# Patient Record
Sex: MALE | Race: WHITE | NOT HISPANIC OR LATINO
[De-identification: names, ages, dates, MRNs, and addresses within clinical notes are randomized per-mention and may not be internally consistent; named-entity substitution may affect disease eponyms.]

---

## 2017-02-13 ENCOUNTER — APPOINTMENT (OUTPATIENT)
Dept: OTOLARYNGOLOGY | Facility: CLINIC | Age: 62
End: 2017-02-13

## 2017-02-21 LAB — BACTERIA FLD CULT: ABNORMAL

## 2017-03-14 ENCOUNTER — OUTPATIENT (OUTPATIENT)
Dept: OUTPATIENT SERVICES | Facility: HOSPITAL | Age: 62
LOS: 1 days | End: 2017-03-14
Payer: COMMERCIAL

## 2017-03-14 PROCEDURE — A9585: CPT

## 2017-03-14 PROCEDURE — 70553 MRI BRAIN STEM W/O & W/DYE: CPT

## 2017-03-14 PROCEDURE — 70553 MRI BRAIN STEM W/O & W/DYE: CPT | Mod: 26

## 2017-03-16 ENCOUNTER — APPOINTMENT (OUTPATIENT)
Dept: NEUROLOGY | Facility: CLINIC | Age: 62
End: 2017-03-16

## 2017-03-16 VITALS
BODY MASS INDEX: 23.64 KG/M2 | WEIGHT: 156 LBS | SYSTOLIC BLOOD PRESSURE: 128 MMHG | HEIGHT: 68 IN | DIASTOLIC BLOOD PRESSURE: 77 MMHG | TEMPERATURE: 97.6 F | OXYGEN SATURATION: 99 % | HEART RATE: 69 BPM

## 2017-03-21 ENCOUNTER — MEDICATION RENEWAL (OUTPATIENT)
Age: 62
End: 2017-03-21

## 2017-03-27 ENCOUNTER — MEDICATION RENEWAL (OUTPATIENT)
Age: 62
End: 2017-03-27

## 2017-03-30 ENCOUNTER — OUTPATIENT (OUTPATIENT)
Dept: OUTPATIENT SERVICES | Facility: HOSPITAL | Age: 62
LOS: 1 days | End: 2017-03-30
Payer: COMMERCIAL

## 2017-03-30 DIAGNOSIS — D64.9 ANEMIA, UNSPECIFIED: ICD-10-CM

## 2017-03-30 DIAGNOSIS — R53.1 WEAKNESS: ICD-10-CM

## 2017-03-30 DIAGNOSIS — M24.50 CONTRACTURE, UNSPECIFIED JOINT: ICD-10-CM

## 2017-03-30 DIAGNOSIS — D89.89 OTHER SPECIFIED DISORDERS INVOLVING THE IMMUNE MECHANISM, NOT ELSEWHERE CLASSIFIED: ICD-10-CM

## 2017-03-30 LAB
BASOPHILS NFR BLD AUTO: 0.2 % — SIGNIFICANT CHANGE UP (ref 0–2)
EOSINOPHIL NFR BLD AUTO: 1.6 % — SIGNIFICANT CHANGE UP (ref 0–6)
ERYTHROCYTE [SEDIMENTATION RATE] IN BLOOD: 15 MM/HR — SIGNIFICANT CHANGE UP
HCT VFR BLD CALC: 39.6 % — SIGNIFICANT CHANGE UP (ref 39–50)
HGB BLD-MCNC: 12.9 G/DL — LOW (ref 13–17)
LYMPHOCYTES # BLD AUTO: 36.1 % — SIGNIFICANT CHANGE UP (ref 13–44)
MCHC RBC-ENTMCNC: 29.1 PG — SIGNIFICANT CHANGE UP (ref 27–34)
MCHC RBC-ENTMCNC: 32.6 G/DL — SIGNIFICANT CHANGE UP (ref 32–36)
MCV RBC AUTO: 89.2 FL — SIGNIFICANT CHANGE UP (ref 80–100)
MONOCYTES NFR BLD AUTO: 7 % — SIGNIFICANT CHANGE UP (ref 2–14)
NEUTROPHILS NFR BLD AUTO: 55.1 % — SIGNIFICANT CHANGE UP (ref 43–77)
PLATELET # BLD AUTO: 136 K/UL — LOW (ref 150–400)
RBC # BLD: 4.44 M/UL — SIGNIFICANT CHANGE UP (ref 4.2–5.8)
RBC # FLD: 14.5 % — SIGNIFICANT CHANGE UP (ref 10.3–16.9)
WBC # BLD: 4.4 K/UL — SIGNIFICANT CHANGE UP (ref 3.8–10.5)
WBC # FLD AUTO: 4.4 K/UL — SIGNIFICANT CHANGE UP (ref 3.8–10.5)

## 2017-03-30 PROCEDURE — 85025 COMPLETE CBC W/AUTO DIFF WBC: CPT

## 2017-03-30 PROCEDURE — 36415 COLL VENOUS BLD VENIPUNCTURE: CPT

## 2017-03-30 PROCEDURE — 85652 RBC SED RATE AUTOMATED: CPT

## 2017-03-31 LAB
DACRYOCYTES BLD QL SMEAR: SLIGHT — SIGNIFICANT CHANGE UP
EOSINOPHIL NFR BLD AUTO: 1 % — SIGNIFICANT CHANGE UP (ref 0–6)
LYMPHOCYTES # BLD AUTO: 33 % — SIGNIFICANT CHANGE UP (ref 13–44)
MANUAL DIF COMMENT BLD-IMP: SIGNIFICANT CHANGE UP
MANUAL SMEAR VERIFICATION: YES — SIGNIFICANT CHANGE UP
MONOCYTES NFR BLD AUTO: 8 % — SIGNIFICANT CHANGE UP (ref 2–14)
NEUTROPHILS NFR BLD AUTO: 56 % — SIGNIFICANT CHANGE UP (ref 43–77)
NEUTS BAND # BLD: 2 % — SIGNIFICANT CHANGE UP
OVALOCYTES BLD QL SMEAR: SLIGHT — SIGNIFICANT CHANGE UP
PLAT MORPH BLD: NORMAL — SIGNIFICANT CHANGE UP
POLYCHROMASIA BLD QL SMEAR: SLIGHT — SIGNIFICANT CHANGE UP
RBC BLD AUTO: (no result)

## 2017-04-17 ENCOUNTER — LABORATORY RESULT (OUTPATIENT)
Age: 62
End: 2017-04-17

## 2017-04-17 ENCOUNTER — APPOINTMENT (OUTPATIENT)
Dept: OTOLARYNGOLOGY | Facility: CLINIC | Age: 62
End: 2017-04-17

## 2017-04-17 ENCOUNTER — OTHER (OUTPATIENT)
Age: 62
End: 2017-04-17

## 2017-04-18 LAB
25(OH)D3 SERPL-MCNC: 31.3 NG/ML
BASOPHILS # BLD AUTO: 0.02 K/UL
BASOPHILS NFR BLD AUTO: 0.5 %
EOSINOPHIL # BLD AUTO: 0.08 K/UL
EOSINOPHIL NFR BLD AUTO: 2 %
HCT VFR BLD CALC: 40.2 %
HGB BLD-MCNC: 12.2 G/DL
IMM GRANULOCYTES NFR BLD AUTO: 0.2 %
INR PPP: 1.01 RATIO
LYMPHOCYTES # BLD AUTO: 1.9 K/UL
LYMPHOCYTES NFR BLD AUTO: 46.7 %
MAN DIFF?: NORMAL
MCHC RBC-ENTMCNC: 27.9 PG
MCHC RBC-ENTMCNC: 30.3 GM/DL
MCV RBC AUTO: 92 FL
MONOCYTES # BLD AUTO: 0.44 K/UL
MONOCYTES NFR BLD AUTO: 10.8 %
NEUTROPHILS # BLD AUTO: 1.62 K/UL
NEUTROPHILS NFR BLD AUTO: 39.8 %
PLATELET # BLD AUTO: 144 K/UL
PT BLD: 11.4 SEC
RBC # BLD: 4.37 M/UL
RBC # FLD: 14.3 %
WBC # FLD AUTO: 4.07 K/UL

## 2017-04-19 ENCOUNTER — FORM ENCOUNTER (OUTPATIENT)
Age: 62
End: 2017-04-19

## 2017-04-19 ENCOUNTER — MEDICATION RENEWAL (OUTPATIENT)
Age: 62
End: 2017-04-19

## 2017-04-19 LAB
ALBUMIN SERPL ELPH-MCNC: 4 G/DL
ALP BLD-CCNC: 46 U/L
ALT SERPL-CCNC: 14 U/L
ANION GAP SERPL CALC-SCNC: 16 MMOL/L
AST SERPL-CCNC: 26 U/L
BILIRUB SERPL-MCNC: 0.4 MG/DL
BUN SERPL-MCNC: 23 MG/DL
CALCIUM SERPL-MCNC: 9.1 MG/DL
CHLORIDE SERPL-SCNC: 105 MMOL/L
CHOLEST SERPL-MCNC: 118 MG/DL
CHOLEST/HDLC SERPL: 2.5 RATIO
CO2 SERPL-SCNC: 20 MMOL/L
CREAT SERPL-MCNC: 1.41 MG/DL
GLUCOSE SERPL-MCNC: 97 MG/DL
HDLC SERPL-MCNC: 47 MG/DL
LDLC SERPL CALC-MCNC: 41 MG/DL
POTASSIUM SERPL-SCNC: 4.7 MMOL/L
PROT SERPL-MCNC: 6.4 G/DL
PSA SERPL-MCNC: 0.85 NG/ML
SODIUM SERPL-SCNC: 141 MMOL/L
T4 FREE SERPL-MCNC: 0.9 NG/DL
TESTOST SERPL-MCNC: 335.3 NG/DL
TRIGL SERPL-MCNC: 148 MG/DL
TSH SERPL-ACNC: 2.07 UIU/ML

## 2017-04-19 RX ORDER — PROMETHAZINE HYDROCHLORIDE 6.25 MG/5ML
6.25 SOLUTION ORAL EVERY 8 HOURS
Qty: 210 | Refills: 0 | Status: COMPLETED | COMMUNITY
Start: 2017-03-27 | End: 2017-04-19

## 2017-04-20 ENCOUNTER — LABORATORY RESULT (OUTPATIENT)
Age: 62
End: 2017-04-20

## 2017-04-20 ENCOUNTER — OUTPATIENT (OUTPATIENT)
Dept: OUTPATIENT SERVICES | Facility: HOSPITAL | Age: 62
LOS: 1 days | End: 2017-04-20
Payer: COMMERCIAL

## 2017-04-20 ENCOUNTER — APPOINTMENT (OUTPATIENT)
Dept: NEPHROLOGY | Facility: CLINIC | Age: 62
End: 2017-04-20

## 2017-04-20 VITALS — SYSTOLIC BLOOD PRESSURE: 108 MMHG | DIASTOLIC BLOOD PRESSURE: 50 MMHG | HEART RATE: 48 BPM

## 2017-04-20 VITALS — HEART RATE: 72 BPM | SYSTOLIC BLOOD PRESSURE: 100 MMHG | DIASTOLIC BLOOD PRESSURE: 52 MMHG

## 2017-04-20 VITALS — SYSTOLIC BLOOD PRESSURE: 112 MMHG | DIASTOLIC BLOOD PRESSURE: 60 MMHG | HEART RATE: 60 BPM

## 2017-04-20 VITALS — WEIGHT: 156 LBS | BODY MASS INDEX: 23.72 KG/M2 | HEART RATE: 63 BPM | OXYGEN SATURATION: 98 %

## 2017-04-20 PROCEDURE — 76856 US EXAM PELVIC COMPLETE: CPT

## 2017-04-20 PROCEDURE — 76856 US EXAM PELVIC COMPLETE: CPT | Mod: 26,59

## 2017-04-20 PROCEDURE — A9562: CPT

## 2017-04-20 PROCEDURE — 93975 VASCULAR STUDY: CPT

## 2017-04-20 PROCEDURE — 78707 K FLOW/FUNCT IMAGE W/O DRUG: CPT

## 2017-04-20 PROCEDURE — 76700 US EXAM ABDOM COMPLETE: CPT

## 2017-04-20 PROCEDURE — 76700 US EXAM ABDOM COMPLETE: CPT | Mod: 26,59

## 2017-04-20 PROCEDURE — 93975 VASCULAR STUDY: CPT | Mod: 26

## 2017-04-20 PROCEDURE — 78707 K FLOW/FUNCT IMAGE W/O DRUG: CPT | Mod: 26

## 2017-04-21 LAB
APPEARANCE: CLEAR
BILIRUBIN URINE: NEGATIVE
BLOOD URINE: NEGATIVE
COLOR: YELLOW
GLUCOSE QUALITATIVE U: NORMAL MG/DL
KETONES URINE: NEGATIVE
LEUKOCYTE ESTERASE URINE: NEGATIVE
NITRITE URINE: NEGATIVE
PH URINE: 5
PROTEIN URINE: NEGATIVE MG/DL
SPECIFIC GRAVITY URINE: 1.02
UROBILINOGEN URINE: NORMAL MG/DL

## 2017-04-23 LAB
24R-OH-CALCIDIOL SERPL-MCNC: 51.5 PG/ML
25(OH)D3 SERPL-MCNC: 37.2 NG/ML
ACTH SER-ACNC: 20 PG/ML
ALBUMIN SERPL ELPH-MCNC: 4.7 G/DL
ALP BLD-CCNC: 56 U/L
ALT SERPL-CCNC: 21 U/L
ANA SER IF-ACNC: NEGATIVE
ANION GAP SERPL CALC-SCNC: 15 MMOL/L
AST SERPL-CCNC: 29 U/L
BASOPHILS # BLD AUTO: 0.01 K/UL
BASOPHILS NFR BLD AUTO: 0.2 %
BILIRUB SERPL-MCNC: 0.4 MG/DL
BUN SERPL-MCNC: 16 MG/DL
CALCIUM SERPL-MCNC: 9.9 MG/DL
CALCIUM SERPL-MCNC: 9.9 MG/DL
CHLORIDE SERPL-SCNC: 106 MMOL/L
CHOLEST SERPL-MCNC: 136 MG/DL
CHOLEST/HDLC SERPL: 2.6 RATIO
CO2 SERPL-SCNC: 21 MMOL/L
CORTIS SERPL-MCNC: 22.4 UG/DL
CREAT SERPL-MCNC: 1.37 MG/DL
CRP SERPL-MCNC: <0.2 MG/DL
EOSINOPHIL # BLD AUTO: 0.05 K/UL
EOSINOPHIL NFR BLD AUTO: 1 %
ERYTHROCYTE [SEDIMENTATION RATE] IN BLOOD BY WESTERGREN METHOD: 26 MM/HR
FERRITIN SERPL-MCNC: 189.8 NG/ML
FOLATE SERPL-MCNC: >20 NG/ML
FSH SERPL-MCNC: 5 IU/L
GLUCOSE SERPL-MCNC: 88 MG/DL
HBA1C MFR BLD HPLC: 5.8 %
HBV SURFACE AB SER QL: REACTIVE
HBV SURFACE AG SER QL: NONREACTIVE
HCT VFR BLD CALC: 43.5 %
HCV AB SER QL: NONREACTIVE
HCV S/CO RATIO: 0.12 S/CO
HCYS SERPL-MCNC: 9.2 UMOL/L
HDLC SERPL-MCNC: 52 MG/DL
HGB BLD-MCNC: 14 G/DL
IGF-I BLD-MCNC: 174 NG/ML
IMM GRANULOCYTES NFR BLD AUTO: 0.2 %
IRON SATN MFR SERPL: 35 %
IRON SERPL-MCNC: 108 UG/DL
LDLC SERPL CALC-MCNC: 60 MG/DL
LH SERPL-ACNC: 2.9 IU/L
LYMPHOCYTES # BLD AUTO: 1.18 K/UL
LYMPHOCYTES NFR BLD AUTO: 24.4 %
MAGNESIUM SERPL-MCNC: 2.2 MG/DL
MAN DIFF?: NORMAL
MCHC RBC-ENTMCNC: 29.4 PG
MCHC RBC-ENTMCNC: 32.2 GM/DL
MCV RBC AUTO: 91.4 FL
MONOCYTES # BLD AUTO: 0.32 K/UL
MONOCYTES NFR BLD AUTO: 6.6 %
MPO AB + PR3 PNL SER: NORMAL
NEUTROPHILS # BLD AUTO: 3.26 K/UL
NEUTROPHILS NFR BLD AUTO: 67.6 %
PARATHYROID HORMONE INTACT: 50 PG/ML
PHOSPHATE SERPL-MCNC: 2.6 MG/DL
PLATELET # BLD AUTO: 145 K/UL
POTASSIUM SERPL-SCNC: 4.9 MMOL/L
PROLACTIN SERPL-MCNC: 20.2 NG/ML
PROT SERPL-MCNC: 7.7 G/DL
RBC # BLD: 4.76 M/UL
RBC # FLD: 14.5 %
RHEUMATOID FACT SER QL: <7 IU/ML
SODIUM SERPL-SCNC: 142 MMOL/L
T3FREE SERPL-MCNC: 2.98 PG/ML
T3RU NFR SERPL: 1.16 INDEX
T4 FREE SERPL-MCNC: 1 NG/DL
T4 SERPL-MCNC: 6.9 UG/DL
THYROGLOB AB SERPL-ACNC: <20 IU/ML
THYROPEROXIDASE AB SERPL IA-ACNC: 16.7 IU/ML
TIBC SERPL-MCNC: 312 UG/DL
TRIGL SERPL-MCNC: 121 MG/DL
TSH SERPL-ACNC: 2.63 UIU/ML
UIBC SERPL-MCNC: 204 UG/DL
URATE SERPL-MCNC: 5.2 MG/DL
VIT B12 SERPL-MCNC: 342 PG/ML
WBC # FLD AUTO: 4.83 K/UL

## 2017-04-24 ENCOUNTER — LABORATORY RESULT (OUTPATIENT)
Age: 62
End: 2017-04-24

## 2017-04-24 LAB — VIT B6 SERPL-MCNC: 7.4 UG/L

## 2017-04-25 DIAGNOSIS — R03.0 ELEVATED BLOOD-PRESSURE READING, WITHOUT DIAGNOSIS OF HYPERTENSION: ICD-10-CM

## 2017-04-25 DIAGNOSIS — I95.9 HYPOTENSION, UNSPECIFIED: ICD-10-CM

## 2017-04-25 DIAGNOSIS — Z96.0 PRESENCE OF UROGENITAL IMPLANTS: ICD-10-CM

## 2017-04-25 DIAGNOSIS — N28.1 CYST OF KIDNEY, ACQUIRED: ICD-10-CM

## 2017-04-25 LAB
ALBUMIN MFR SERPL ELPH: 56.6 %
ALBUMIN SERPL-MCNC: 4.4 G/DL
ALBUMIN/GLOB SERPL: 1.3 RATIO
ALBUPE: 9.4 %
ALDOSTERONE SERUM: 5.8 NG/DL
ALP BONE SERPL-MCNC: 10 MCG/L
ALPHA1 GLOB MFR SERPL ELPH: 5.1 %
ALPHA1 GLOB SERPL ELPH-MCNC: 0.4 G/DL
ALPHA1UPE: 4.3 %
ALPHA2 GLOB MFR SERPL ELPH: 9 %
ALPHA2 GLOB SERPL ELPH-MCNC: 0.7 G/DL
ALPHA2UPE: 13.5 %
B-GLOBULIN MFR SERPL ELPH: 11.3 %
B-GLOBULIN SERPL ELPH-MCNC: 0.9 G/DL
BENCE JONES EXCRETION: 0 MG/24HR
BETAUPE: 17.2 %
C3 SERPL-MCNC: 163 MG/DL
C4 SERPL-MCNC: 43 MG/DL
CAU: 3 MG/DL
CHLORIDE UR-SCNC: 159 MMOL/24HR
COLLAGEN CTX SERPL-MCNC: 197 PG/ML
CREAT 24H UR-MCNC: 1.6 G/24 H
CREAT ?TM UR-MCNC: 119 MG/DL
CREATININE UR (MAYO): 119 MG/DL
CYSTATIN C SERPL-MCNC: 0.91 MG/L
DEPRECATED KAPPA LC FREE/LAMBDA SER: 1.92 RATIO
DEPRECATED KAPPA LC FREE/LAMBDA SER: 1.92 RATIO
GAMMA GLOB FLD ELPH-MCNC: 1.4 G/DL
GAMMA GLOB MFR SERPL ELPH: 18 %
GAMMAUPE: 55.6 %
GFR/BSA.PRED SERPLBLD CYS-BASED-ARV: 87
IGA 24H UR QL IFE: NORMAL
IGA SER QL IEP: 204 MG/DL
IGG SER QL IEP: 1420 MG/DL
IGM SER QL IEP: 132 MG/DL
INTERPRETATION SERPL IEP-IMP: NORMAL
KAPPA LC 24H UR QL: 0 MG/DL
KAPPA LC CSF-MCNC: 1.19 MG/DL
KAPPA LC CSF-MCNC: 1.19 MG/DL
KAPPA LC SERPL-MCNC: 2.28 MG/DL
KAPPA LC SERPL-MCNC: 2.28 MG/DL
M PROTEIN 24H MFR UR ELPH: 0 %
M PROTEIN SPEC IFE-MCNC: NORMAL
MICROALBUMIN 24H UR DL<=1MG/L-MCNC: 1.2 MG/DL
MICROALBUMIN 24H UR DL<=1MG/L-MRATE: 16.2 MG/24HR
MICROALBUMIN ?TM UR-MRATE: 11.2 UG/MIN
OSMOLALITY 24H UR: 606 MOS/KG
PHOSPHATE/CREAT 24H UR-SRTO: 0.7 G/24 H
POTASSIUM 24H UR-MRATE: 74 MMOL/24HR
POTASSIUM ?TM SUB UR QN: 55 MMOL/L
PROT 24H UR-MRATE: 6 MG/DL
PROT ?TM UR-MCNC: 24 HR
PROT PATTERN 24H UR ELPH-IMP: NORMAL
PROT SERPL-MCNC: 7.7 G/DL
PROT SERPL-MCNC: 7.7 G/DL
PROT UR-MCNC: 6 MG/DL
PROT UR-MCNC: 6 MG/DL
PROT UR-MCNC: 81 MG/24 H
SHBG SERPL-SCNC: 84 NMOL/L
SODIUM 24H UR-SCNC: 115 MMOL/L
SODIUM 24H UR-SRATE: 155 MMOL/24HR
SODIUM ?TM SUB UR QN: 118 MMOL/L
SPECIMEN VOL 24H UR: 1350 ML
SPECIMEN VOL 24H UR: 40 MG/24 H
U CL: 118 MMOL/L
U PHOS: 53.8 MG/DL
U PROTEIN QNT CALCULATION: 81 MG/24 H
U URIC: 32 MG/DL
URATE/CREAT 24H UR: 432 MG/24HR

## 2017-04-30 LAB
B2 MICROGLOB 24H UR-MCNC: 0.1 MG/L
CORTIS 24H UR-MCNC: 24 H
CORTIS 24H UR-MRATE: 12 MCG/24 H
FREE KAPPA URINE: 6.44 MG/L
FREE KAPPA/LAMDA RATIO: 32.2
FREE LAMDA URINE: 0.2 MG/L
METHYLMALONATE SERPL-SCNC: 378 NMOL/L
OXALATE UR-SCNC: 23 MG/24 H
SPECIMEN VOL 24H UR: 1350 ML
SPECIMEN VOL 24H UR: 1350 ML/24 H
TESTOST BND SERPL-MCNC: 2.1 PG/ML
TESTOST SERPL-MCNC: 476.1 NG/DL

## 2017-05-19 RX ORDER — EPINEPHRINE 0.3 MG/.3ML
0.3 INJECTION INTRAMUSCULAR
Qty: 2 | Refills: 0 | Status: DISCONTINUED | COMMUNITY
Start: 2017-03-04 | End: 2017-05-19

## 2017-05-19 RX ORDER — TERBINAFINE HYDROCHLORIDE 250 MG/1
250 TABLET ORAL
Qty: 30 | Refills: 0 | Status: DISCONTINUED | COMMUNITY
Start: 2016-09-15 | End: 2017-05-19

## 2017-05-19 RX ORDER — PREDNISONE 10 MG/1
10 TABLET ORAL
Qty: 90 | Refills: 0 | Status: DISCONTINUED | COMMUNITY
Start: 2016-12-01 | End: 2017-05-19

## 2017-05-19 RX ORDER — DIPHENHYDRAMINE HCL 50 MG/1
50 CAPSULE ORAL
Qty: 60 | Refills: 0 | Status: DISCONTINUED | COMMUNITY
Start: 2017-03-04 | End: 2017-05-19

## 2017-05-19 RX ORDER — OSELTAMIVIR PHOSPHATE 75 MG/1
75 CAPSULE ORAL
Qty: 10 | Refills: 0 | Status: DISCONTINUED | COMMUNITY
Start: 2017-01-24 | End: 2017-05-19

## 2017-05-19 RX ORDER — THEOPHYLLINE 300 MG/1
300 TABLET, EXTENDED RELEASE ORAL
Qty: 60 | Refills: 0 | Status: DISCONTINUED | COMMUNITY
Start: 2016-09-19 | End: 2017-05-19

## 2017-05-19 RX ORDER — KETOCONAZOLE 20.5 MG/ML
2 SHAMPOO, SUSPENSION TOPICAL
Qty: 120 | Refills: 0 | Status: DISCONTINUED | COMMUNITY
Start: 2016-10-25 | End: 2017-05-19

## 2017-05-19 RX ORDER — AZITHROMYCIN DIHYDRATE 250 MG/1
250 TABLET, FILM COATED ORAL
Qty: 1 | Refills: 0 | Status: DISCONTINUED | COMMUNITY
Start: 2017-03-27 | End: 2017-05-19

## 2017-05-19 RX ORDER — PREDNISONE 5 MG/1
5 TABLET ORAL
Qty: 120 | Refills: 0 | Status: DISCONTINUED | COMMUNITY
Start: 2017-01-04 | End: 2017-05-19

## 2017-05-19 RX ORDER — INDOMETHACIN 75 MG/1
75 CAPSULE, EXTENDED RELEASE ORAL
Qty: 60 | Refills: 0 | Status: DISCONTINUED | COMMUNITY
Start: 2017-03-12 | End: 2017-05-19

## 2017-05-22 LAB
ALBUMIN SERPL ELPH-MCNC: 4.2 G/DL
ALP BLD-CCNC: 55 U/L
ALT SERPL-CCNC: 10 U/L
ANION GAP SERPL CALC-SCNC: 15 MMOL/L
AST SERPL-CCNC: 19 U/L
BASOPHILS # BLD AUTO: 0.01 K/UL
BASOPHILS NFR BLD AUTO: 0.2 %
BILIRUB SERPL-MCNC: 0.3 MG/DL
BUN SERPL-MCNC: 19 MG/DL
CALCIUM SERPL-MCNC: 9.1 MG/DL
CHLORIDE SERPL-SCNC: 106 MMOL/L
CHOLEST SERPL-MCNC: 162 MG/DL
CHOLEST/HDLC SERPL: 3.6 RATIO
CK SERPL-CCNC: 99 U/L
CO2 SERPL-SCNC: 21 MMOL/L
CREAT SERPL-MCNC: 1.19 MG/DL
EOSINOPHIL # BLD AUTO: 0.11 K/UL
EOSINOPHIL NFR BLD AUTO: 2.4 %
GLUCOSE SERPL-MCNC: 90 MG/DL
HBA1C MFR BLD HPLC: 5.8 %
HCT VFR BLD CALC: 41.3 %
HDLC SERPL-MCNC: 45 MG/DL
HGB BLD-MCNC: 12.8 G/DL
IMM GRANULOCYTES NFR BLD AUTO: 0.2 %
LDLC SERPL CALC-MCNC: 94 MG/DL
LYMPHOCYTES # BLD AUTO: 2.11 K/UL
LYMPHOCYTES NFR BLD AUTO: 46.1 %
MAN DIFF?: NORMAL
MCHC RBC-ENTMCNC: 27.9 PG
MCHC RBC-ENTMCNC: 31 GM/DL
MCV RBC AUTO: 90.2 FL
MONOCYTES # BLD AUTO: 0.41 K/UL
MONOCYTES NFR BLD AUTO: 9 %
NEUTROPHILS # BLD AUTO: 1.93 K/UL
NEUTROPHILS NFR BLD AUTO: 42.1 %
PLATELET # BLD AUTO: 134 K/UL
POTASSIUM SERPL-SCNC: 4.6 MMOL/L
PROT SERPL-MCNC: 6.7 G/DL
PSA SERPL-MCNC: 1.09 NG/ML
RBC # BLD: 4.58 M/UL
RBC # FLD: 14.2 %
SODIUM SERPL-SCNC: 142 MMOL/L
T4 FREE SERPL-MCNC: 1 NG/DL
TRIGL SERPL-MCNC: 117 MG/DL
TSH SERPL-ACNC: 1.93 UIU/ML
WBC # FLD AUTO: 4.58 K/UL

## 2017-05-23 LAB — ALDOSTERONE SERUM: 6.3 NG/DL

## 2017-05-24 LAB
TESTOST BND SERPL-MCNC: 5.8 PG/ML
TESTOST SERPL-MCNC: 932.4 NG/DL

## 2017-06-06 ENCOUNTER — APPOINTMENT (OUTPATIENT)
Dept: OPHTHALMOLOGY | Facility: CLINIC | Age: 62
End: 2017-06-06

## 2017-06-06 DIAGNOSIS — Z87.09 PERSONAL HISTORY OF OTHER DISEASES OF THE RESPIRATORY SYSTEM: ICD-10-CM

## 2017-06-13 PROBLEM — Z87.09 HISTORY OF ACUTE PHARYNGITIS: Status: RESOLVED | Noted: 2017-03-27 | Resolved: 2017-06-13

## 2017-06-15 LAB
25(OH)D3 SERPL-MCNC: 24.4 NG/ML
ALDOSTERONE SERUM: 16.3 NG/DL
BASOPHILS # BLD AUTO: 0.01 K/UL
BASOPHILS NFR BLD AUTO: 0.2 %
CCP AB SER IA-ACNC: <8 UNITS
CHOLEST SERPL-MCNC: 217 MG/DL
CHOLEST/HDLC SERPL: 5.2 RATIO
CK MB BLD-MCNC: 0.8 NG/ML
CRP SERPL-MCNC: <0.2 MG/DL
EOSINOPHIL # BLD AUTO: 0.09 K/UL
EOSINOPHIL NFR BLD AUTO: 2.1 %
ERYTHROCYTE [SEDIMENTATION RATE] IN BLOOD BY WESTERGREN METHOD: 19 MM/HR
FERRITIN SERPL-MCNC: 188 NG/ML
FOLATE SERPL-MCNC: >20 NG/ML
HCT VFR BLD CALC: 45.1 %
HDLC SERPL-MCNC: 42 MG/DL
HGB BLD-MCNC: 13.6 G/DL
IMM GRANULOCYTES NFR BLD AUTO: 0 %
IRON SATN MFR SERPL: 32 %
IRON SERPL-MCNC: 98 UG/DL
LDLC SERPL CALC-MCNC: 122 MG/DL
LYMPHOCYTES # BLD AUTO: 1.85 K/UL
LYMPHOCYTES NFR BLD AUTO: 44 %
MAN DIFF?: NORMAL
MCHC RBC-ENTMCNC: 27.6 PG
MCHC RBC-ENTMCNC: 30.2 GM/DL
MCV RBC AUTO: 91.5 FL
MONOCYTES # BLD AUTO: 0.4 K/UL
MONOCYTES NFR BLD AUTO: 9.5 %
NEUTROPHILS # BLD AUTO: 1.85 K/UL
NEUTROPHILS NFR BLD AUTO: 44.2 %
PLATELET # BLD AUTO: 153 K/UL
RBC # BLD: 4.93 M/UL
RBC # FLD: 15 %
RF+CCP IGG SER-IMP: NEGATIVE
RHEUMATOID FACT SER QL: <7 IU/ML
TESTOST SERPL-MCNC: 364.9 NG/DL
TIBC SERPL-MCNC: 309 UG/DL
TRIGL SERPL-MCNC: 267 MG/DL
UIBC SERPL-MCNC: 211 UG/DL
WBC # FLD AUTO: 4.2 K/UL

## 2017-06-19 LAB
CARN ESTERS SERPL-MCNC: 14.6 UMOL/L
CARNITINE FREE SERPL-SCNC: 38.5 UMOL/L
CARNITINE FREE SFR SERPL: 0.4 UMOL/L
CARNITINE SERPL-SCNC: 53.2 UMOL/L

## 2017-07-26 ENCOUNTER — APPOINTMENT (OUTPATIENT)
Dept: NEUROLOGY | Facility: CLINIC | Age: 62
End: 2017-07-26

## 2017-07-26 VITALS — HEIGHT: 68 IN | BODY MASS INDEX: 23.19 KG/M2 | WEIGHT: 153 LBS

## 2017-07-26 VITALS — OXYGEN SATURATION: 96 % | SYSTOLIC BLOOD PRESSURE: 119 MMHG | DIASTOLIC BLOOD PRESSURE: 74 MMHG | HEART RATE: 64 BPM

## 2017-07-27 ENCOUNTER — MEDICATION RENEWAL (OUTPATIENT)
Age: 62
End: 2017-07-27

## 2017-07-27 RX ORDER — TESTOSTERONE 30 MG/1.5ML
30 SOLUTION TOPICAL
Qty: 30 | Refills: 0 | Status: DISCONTINUED | COMMUNITY
Start: 2016-09-01 | End: 2017-07-27

## 2017-08-17 ENCOUNTER — MEDICATION RENEWAL (OUTPATIENT)
Age: 62
End: 2017-08-17

## 2017-11-28 LAB
25(OH)D3 SERPL-MCNC: 34.4 NG/ML
ALBUMIN SERPL ELPH-MCNC: 4.2 G/DL
ALP BLD-CCNC: 61 U/L
ALT SERPL-CCNC: 20 U/L
ANION GAP SERPL CALC-SCNC: 18 MMOL/L
AST SERPL-CCNC: 21 U/L
BASOPHILS # BLD AUTO: 0.03 K/UL
BASOPHILS NFR BLD AUTO: 0.4 %
BILIRUB SERPL-MCNC: 0.6 MG/DL
BUN SERPL-MCNC: 16 MG/DL
CALCIUM SERPL-MCNC: 9.4 MG/DL
CHLORIDE SERPL-SCNC: 104 MMOL/L
CHOLEST SERPL-MCNC: 215 MG/DL
CHOLEST/HDLC SERPL: 3.4 RATIO
CO2 SERPL-SCNC: 21 MMOL/L
CREAT SERPL-MCNC: 0.76 MG/DL
EOSINOPHIL # BLD AUTO: 0.86 K/UL
EOSINOPHIL NFR BLD AUTO: 11.3 %
GLUCOSE SERPL-MCNC: 140 MG/DL
HBA1C MFR BLD HPLC: 5.4 %
HCT VFR BLD CALC: 44 %
HDLC SERPL-MCNC: 64 MG/DL
HGB BLD-MCNC: 14.6 G/DL
IMM GRANULOCYTES NFR BLD AUTO: 0.1 %
LDLC SERPL CALC-MCNC: 131 MG/DL
LYMPHOCYTES # BLD AUTO: 1.98 K/UL
LYMPHOCYTES NFR BLD AUTO: 26 %
MAN DIFF?: NORMAL
MCHC RBC-ENTMCNC: 31.4 PG
MCHC RBC-ENTMCNC: 33.2 GM/DL
MCV RBC AUTO: 94.6 FL
MONOCYTES # BLD AUTO: 0.26 K/UL
MONOCYTES NFR BLD AUTO: 3.4 %
NEUTROPHILS # BLD AUTO: 4.49 K/UL
NEUTROPHILS NFR BLD AUTO: 58.8 %
PLATELET # BLD AUTO: 226 K/UL
POTASSIUM SERPL-SCNC: 4.1 MMOL/L
PROT SERPL-MCNC: 7.6 G/DL
RBC # BLD: 4.65 M/UL
RBC # FLD: 13.4 %
SODIUM SERPL-SCNC: 143 MMOL/L
T4 FREE SERPL-MCNC: 1.2 NG/DL
TRIGL SERPL-MCNC: 100 MG/DL
TSH SERPL-ACNC: 3.04 UIU/ML
WBC # FLD AUTO: 7.63 K/UL

## 2018-01-31 ENCOUNTER — APPOINTMENT (OUTPATIENT)
Dept: OTOLARYNGOLOGY | Facility: CLINIC | Age: 63
End: 2018-01-31
Payer: COMMERCIAL

## 2018-01-31 DIAGNOSIS — J04.0 ACUTE LARYNGITIS: ICD-10-CM

## 2018-01-31 DIAGNOSIS — K21.9 ACUTE LARYNGITIS: ICD-10-CM

## 2018-01-31 PROCEDURE — 99212 OFFICE O/P EST SF 10 MIN: CPT | Mod: 25

## 2018-01-31 PROCEDURE — 31575 DIAGNOSTIC LARYNGOSCOPY: CPT

## 2018-01-31 RX ORDER — SUVOREXANT 10 MG/1
10 TABLET, FILM COATED ORAL
Qty: 30 | Refills: 0 | Status: COMPLETED | COMMUNITY
Start: 2017-03-21 | End: 2018-01-31

## 2018-01-31 RX ORDER — CLONAZEPAM 0.5 MG/1
0.5 TABLET ORAL
Qty: 60 | Refills: 0 | Status: COMPLETED | COMMUNITY
Start: 2017-07-27 | End: 2018-01-31

## 2018-01-31 RX ORDER — ESOMEPRAZOLE MAGNESIUM 40 MG/1
40 CAPSULE, DELAYED RELEASE ORAL DAILY
Qty: 180 | Refills: 0 | Status: COMPLETED | COMMUNITY
Start: 2017-04-19 | End: 2018-01-31

## 2018-01-31 RX ORDER — TESTOSTERONE 30 MG/1.5ML
30 SOLUTION TOPICAL
Qty: 1 | Refills: 0 | Status: COMPLETED | COMMUNITY
Start: 2017-07-27 | End: 2018-01-31

## 2018-03-29 ENCOUNTER — APPOINTMENT (OUTPATIENT)
Dept: INTERNAL MEDICINE | Facility: CLINIC | Age: 63
End: 2018-03-29
Payer: COMMERCIAL

## 2018-03-29 ENCOUNTER — NON-APPOINTMENT (OUTPATIENT)
Age: 63
End: 2018-03-29

## 2018-03-29 ENCOUNTER — LABORATORY RESULT (OUTPATIENT)
Age: 63
End: 2018-03-29

## 2018-03-29 DIAGNOSIS — Z82.49 FAMILY HISTORY OF ISCHEMIC HEART DISEASE AND OTHER DISEASES OF THE CIRCULATORY SYSTEM: ICD-10-CM

## 2018-03-29 PROCEDURE — 93000 ELECTROCARDIOGRAM COMPLETE: CPT

## 2018-03-29 PROCEDURE — 36415 COLL VENOUS BLD VENIPUNCTURE: CPT

## 2018-04-01 ENCOUNTER — MOBILE ON CALL (OUTPATIENT)
Age: 63
End: 2018-04-01

## 2018-04-02 LAB
ALBUMIN SERPL ELPH-MCNC: 4.1 G/DL
ALP BLD-CCNC: 61 U/L
ALT SERPL-CCNC: 15 U/L
ANION GAP SERPL CALC-SCNC: 14 MMOL/L
AST SERPL-CCNC: 21 U/L
BASOPHILS # BLD AUTO: 0.01 K/UL
BASOPHILS NFR BLD AUTO: 0.2 %
BILIRUB SERPL-MCNC: 0.3 MG/DL
BUN SERPL-MCNC: 21 MG/DL
CALCIUM SERPL-MCNC: 9.6 MG/DL
CHLORIDE SERPL-SCNC: 106 MMOL/L
CO2 SERPL-SCNC: 22 MMOL/L
CREAT SERPL-MCNC: 1.43 MG/DL
EOSINOPHIL # BLD AUTO: 0.06 K/UL
EOSINOPHIL NFR BLD AUTO: 1.2 %
GLUCOSE SERPL-MCNC: 115 MG/DL
HCT VFR BLD CALC: 44.1 %
HGB BLD-MCNC: 13.9 G/DL
IMM GRANULOCYTES NFR BLD AUTO: 0.2 %
LYMPHOCYTES # BLD AUTO: 1.75 K/UL
LYMPHOCYTES NFR BLD AUTO: 33.9 %
MAN DIFF?: NORMAL
MCHC RBC-ENTMCNC: 28.1 PG
MCHC RBC-ENTMCNC: 31.5 GM/DL
MCV RBC AUTO: 89.1 FL
MONOCYTES # BLD AUTO: 0.38 K/UL
MONOCYTES NFR BLD AUTO: 7.4 %
NEUTROPHILS # BLD AUTO: 2.95 K/UL
NEUTROPHILS NFR BLD AUTO: 57.1 %
PLATELET # BLD AUTO: 136 K/UL
POTASSIUM SERPL-SCNC: 4.2 MMOL/L
PROT SERPL-MCNC: 6.7 G/DL
RBC # BLD: 4.95 M/UL
RBC # FLD: 14.6 %
SODIUM SERPL-SCNC: 142 MMOL/L
WBC # FLD AUTO: 5.16 K/UL

## 2018-04-04 LAB — MERCURY BLD-MCNC: 12.9 UG/L

## 2018-04-05 LAB — A-TUMOR NECROSIS FACT SERPL-MCNC: 0.9 PG/ML

## 2018-04-17 ENCOUNTER — FORM ENCOUNTER (OUTPATIENT)
Age: 63
End: 2018-04-17

## 2018-04-17 ENCOUNTER — MEDICATION RENEWAL (OUTPATIENT)
Age: 63
End: 2018-04-17

## 2018-04-17 ENCOUNTER — LABORATORY RESULT (OUTPATIENT)
Age: 63
End: 2018-04-17

## 2018-04-18 ENCOUNTER — OUTPATIENT (OUTPATIENT)
Dept: OUTPATIENT SERVICES | Facility: HOSPITAL | Age: 63
LOS: 1 days | End: 2018-04-18
Payer: COMMERCIAL

## 2018-04-18 LAB
ALBUMIN SERPL ELPH-MCNC: 4.2 G/DL
ALP BLD-CCNC: 55 U/L
ALT SERPL-CCNC: 16 U/L
ANION GAP SERPL CALC-SCNC: 12 MMOL/L
AST SERPL-CCNC: 21 U/L
BASOPHILS # BLD AUTO: 0.02 K/UL
BASOPHILS NFR BLD AUTO: 0.3 %
BILIRUB SERPL-MCNC: 0.4 MG/DL
BUN SERPL-MCNC: 23 MG/DL
CALCIUM SERPL-MCNC: 9.4 MG/DL
CHLORIDE SERPL-SCNC: 104 MMOL/L
CHOLEST SERPL-MCNC: 219 MG/DL
CHOLEST/HDLC SERPL: 4.6 RATIO
CO2 SERPL-SCNC: 25 MMOL/L
CREAT SERPL-MCNC: 1.4 MG/DL
EOSINOPHIL # BLD AUTO: 0.15 K/UL
EOSINOPHIL NFR BLD AUTO: 2.3 %
GLUCOSE SERPL-MCNC: 87 MG/DL
HCT VFR BLD CALC: 45 %
HDLC SERPL-MCNC: 48 MG/DL
HGB BLD-MCNC: 14.4 G/DL
IMM GRANULOCYTES NFR BLD AUTO: 0.2 %
LDLC SERPL CALC-MCNC: 138 MG/DL
LYMPHOCYTES # BLD AUTO: 2.53 K/UL
LYMPHOCYTES NFR BLD AUTO: 38.3 %
MAN DIFF?: NORMAL
MCHC RBC-ENTMCNC: 28.3 PG
MCHC RBC-ENTMCNC: 32 GM/DL
MCV RBC AUTO: 88.4 FL
MONOCYTES # BLD AUTO: 0.42 K/UL
MONOCYTES NFR BLD AUTO: 6.4 %
NEUTROPHILS # BLD AUTO: 3.47 K/UL
NEUTROPHILS NFR BLD AUTO: 52.5 %
PLATELET # BLD AUTO: 135 K/UL
POTASSIUM SERPL-SCNC: 5.2 MMOL/L
PROT SERPL-MCNC: 7.1 G/DL
PSA SERPL-MCNC: 1.15 NG/ML
RBC # BLD: 5.09 M/UL
RBC # FLD: 15.6 %
SODIUM SERPL-SCNC: 141 MMOL/L
TRIGL SERPL-MCNC: 166 MG/DL
WBC # FLD AUTO: 6.6 K/UL

## 2018-04-18 PROCEDURE — 78803 RP LOCLZJ TUM SPECT 1 AREA: CPT

## 2018-04-18 PROCEDURE — A9557: CPT

## 2018-04-18 PROCEDURE — 78607: CPT | Mod: 26

## 2018-04-23 LAB
TESTOST BND SERPL-MCNC: 12.9 PG/ML
TESTOST SERPL-MCNC: 1226.9 NG/DL

## 2018-05-22 LAB
ALBUMIN SERPL ELPH-MCNC: 4.5 G/DL
ALP BLD-CCNC: 55 U/L
ALT SERPL-CCNC: 16 U/L
ANION GAP SERPL CALC-SCNC: 13 MMOL/L
AST SERPL-CCNC: 20 U/L
BASOPHILS # BLD AUTO: 0.01 K/UL
BASOPHILS NFR BLD AUTO: 0.2 %
BILIRUB SERPL-MCNC: 0.4 MG/DL
BUN SERPL-MCNC: 20 MG/DL
CALCIUM SERPL-MCNC: 9.6 MG/DL
CHLORIDE SERPL-SCNC: 104 MMOL/L
CHOLEST SERPL-MCNC: 236 MG/DL
CHOLEST/HDLC SERPL: 4.7 RATIO
CO2 SERPL-SCNC: 23 MMOL/L
CREAT SERPL-MCNC: 1.46 MG/DL
EOSINOPHIL # BLD AUTO: 0.06 K/UL
EOSINOPHIL NFR BLD AUTO: 1.1 %
GLUCOSE SERPL-MCNC: 97 MG/DL
HCT VFR BLD CALC: 44.6 %
HDLC SERPL-MCNC: 50 MG/DL
HGB BLD-MCNC: 14 G/DL
IMM GRANULOCYTES NFR BLD AUTO: 0.4 %
LDLC SERPL CALC-MCNC: 149 MG/DL
LYMPHOCYTES # BLD AUTO: 2.37 K/UL
LYMPHOCYTES NFR BLD AUTO: 44.8 %
MAN DIFF?: NORMAL
MCHC RBC-ENTMCNC: 28.1 PG
MCHC RBC-ENTMCNC: 31.4 GM/DL
MCV RBC AUTO: 89.4 FL
MONOCYTES # BLD AUTO: 0.53 K/UL
MONOCYTES NFR BLD AUTO: 10 %
NEUTROPHILS # BLD AUTO: 2.3 K/UL
NEUTROPHILS NFR BLD AUTO: 43.5 %
PLATELET # BLD AUTO: 159 K/UL
POTASSIUM SERPL-SCNC: 4.9 MMOL/L
PROT SERPL-MCNC: 7 G/DL
PSA FREE SERPL-MCNC: 0.54 NG/ML
RBC # BLD: 4.99 M/UL
RBC # FLD: 17 %
SODIUM SERPL-SCNC: 140 MMOL/L
TESTOST SERPL-MCNC: 1150 NG/DL
TRIGL SERPL-MCNC: 187 MG/DL
WBC # FLD AUTO: 5.29 K/UL

## 2018-05-24 ENCOUNTER — RX RENEWAL (OUTPATIENT)
Age: 63
End: 2018-05-24

## 2018-06-25 ENCOUNTER — LABORATORY RESULT (OUTPATIENT)
Age: 63
End: 2018-06-25

## 2018-06-25 ENCOUNTER — NON-APPOINTMENT (OUTPATIENT)
Age: 63
End: 2018-06-25

## 2018-06-25 ENCOUNTER — APPOINTMENT (OUTPATIENT)
Dept: INTERNAL MEDICINE | Facility: CLINIC | Age: 63
End: 2018-06-25
Payer: COMMERCIAL

## 2018-06-25 PROCEDURE — 99213 OFFICE O/P EST LOW 20 MIN: CPT | Mod: 25

## 2018-06-25 PROCEDURE — 93000 ELECTROCARDIOGRAM COMPLETE: CPT

## 2018-06-25 PROCEDURE — 36415 COLL VENOUS BLD VENIPUNCTURE: CPT

## 2018-06-25 NOTE — PHYSICAL EXAM
[No Acute Distress] : no acute distress [Well Nourished] : well nourished [Well Developed] : well developed [Well-Appearing] : well-appearing [Normal Sclera/Conjunctiva] : normal sclera/conjunctiva [EOMI] : extraocular movements intact [Normal Outer Ear/Nose] : the outer ears and nose were normal in appearance [No Lymphadenopathy] : no lymphadenopathy [Thyroid Normal, No Nodules] : the thyroid was normal and there were no nodules present [No Respiratory Distress] : no respiratory distress  [Clear to Auscultation] : lungs were clear to auscultation bilaterally [No Accessory Muscle Use] : no accessory muscle use [Normal Rate] : normal rate  [Regular Rhythm] : with a regular rhythm [Normal S1, S2] : normal S1 and S2 [Normal Supraclavicular Nodes] : no supraclavicular lymphadenopathy [Normal Anterior Cervical Nodes] : no anterior cervical lymphadenopathy [No CVA Tenderness] : no CVA  tenderness [No Spinal Tenderness] : no spinal tenderness [No Rash] : no rash [Normal Gait] : normal gait [Coordination Grossly Intact] : coordination grossly intact [No Focal Deficits] : no focal deficits [Normal Affect] : the affect was normal [Alert and Oriented x3] : oriented to person, place, and time [Normal Insight/Judgement] : insight and judgment were intact

## 2018-06-25 NOTE — HISTORY OF PRESENT ILLNESS
[FreeTextEntry1] : follow up [de-identified] : prostatitis\par - frequency a/w fullness and pain\par \par LGLL\par - stable\par - follows w/ heme

## 2018-06-27 LAB
25(OH)D3 SERPL-MCNC: 26.6 NG/ML
ALBUMIN SERPL ELPH-MCNC: 4.4 G/DL
ALP BLD-CCNC: 54 U/L
ALT SERPL-CCNC: 19 U/L
ANION GAP SERPL CALC-SCNC: 9 MMOL/L
AST SERPL-CCNC: 20 U/L
BASOPHILS # BLD AUTO: 0 K/UL
BASOPHILS NFR BLD AUTO: 0 %
BILIRUB SERPL-MCNC: 0.3 MG/DL
BUN SERPL-MCNC: 25 MG/DL
CALCIUM SERPL-MCNC: 9.5 MG/DL
CHLORIDE SERPL-SCNC: 103 MMOL/L
CO2 SERPL-SCNC: 29 MMOL/L
CREAT SERPL-MCNC: 1.52 MG/DL
CRP SERPL-MCNC: <0.2 MG/DL
EOSINOPHIL # BLD AUTO: 0.04 K/UL
EOSINOPHIL NFR BLD AUTO: 0.5 %
ERYTHROCYTE [SEDIMENTATION RATE] IN BLOOD BY WESTERGREN METHOD: 6 MM/HR
GLUCOSE SERPL-MCNC: 95 MG/DL
HCT VFR BLD CALC: 44.4 %
HGB BLD-MCNC: 13.6 G/DL
IMM GRANULOCYTES NFR BLD AUTO: 0.9 %
LYMPHOCYTES # BLD AUTO: 1.45 K/UL
LYMPHOCYTES NFR BLD AUTO: 19.6 %
MAN DIFF?: NORMAL
MCHC RBC-ENTMCNC: 28.1 PG
MCHC RBC-ENTMCNC: 30.6 GM/DL
MCV RBC AUTO: 91.7 FL
MONOCYTES # BLD AUTO: 0.38 K/UL
MONOCYTES NFR BLD AUTO: 5.1 %
NEUTROPHILS # BLD AUTO: 5.44 K/UL
NEUTROPHILS NFR BLD AUTO: 73.9 %
PLATELET # BLD AUTO: 143 K/UL
POTASSIUM SERPL-SCNC: 4.7 MMOL/L
PROT SERPL-MCNC: 6.8 G/DL
PSA SERPL-MCNC: 1.52 NG/ML
RBC # BLD: 4.84 M/UL
RBC # FLD: 17.3 %
SODIUM SERPL-SCNC: 141 MMOL/L
TESTOST SERPL-MCNC: 725.9 NG/DL
WBC # FLD AUTO: 7.38 K/UL

## 2018-07-09 ENCOUNTER — RX RENEWAL (OUTPATIENT)
Age: 63
End: 2018-07-09

## 2018-07-09 RX ORDER — LEVOMEFOLATE/ALGAL OIL 15-90.314
15-90.314 CAPSULE ORAL
Qty: 30 | Refills: 3 | Status: DISCONTINUED | COMMUNITY
Start: 2018-07-09 | End: 2018-07-09

## 2018-07-11 ENCOUNTER — RX RENEWAL (OUTPATIENT)
Age: 63
End: 2018-07-11

## 2018-07-23 ENCOUNTER — APPOINTMENT (OUTPATIENT)
Dept: NEUROLOGY | Facility: CLINIC | Age: 63
End: 2018-07-23

## 2018-07-24 ENCOUNTER — APPOINTMENT (OUTPATIENT)
Dept: NEUROLOGY | Facility: CLINIC | Age: 63
End: 2018-07-24
Payer: COMMERCIAL

## 2018-07-24 PROCEDURE — 64615 CHEMODENERV MUSC MIGRAINE: CPT

## 2018-08-07 ENCOUNTER — LABORATORY RESULT (OUTPATIENT)
Age: 63
End: 2018-08-07

## 2018-08-09 ENCOUNTER — MOBILE ON CALL (OUTPATIENT)
Age: 63
End: 2018-08-09

## 2018-08-10 LAB
25(OH)D3 SERPL-MCNC: 29.8 NG/ML
ALBUMIN SERPL ELPH-MCNC: 4.6 G/DL
ALP BLD-CCNC: 54 U/L
ALT SERPL-CCNC: 12 U/L
ANION GAP SERPL CALC-SCNC: 14 MMOL/L
AST SERPL-CCNC: 20 U/L
BASOPHILS # BLD AUTO: 0.01 K/UL
BASOPHILS NFR BLD AUTO: 0.2 %
BILIRUB SERPL-MCNC: 0.6 MG/DL
BUN SERPL-MCNC: 22 MG/DL
CALCIUM SERPL-MCNC: 9.5 MG/DL
CHLORIDE SERPL-SCNC: 104 MMOL/L
CHOLEST SERPL-MCNC: 215 MG/DL
CHOLEST/HDLC SERPL: 4.4 RATIO
CK SERPL-CCNC: 131 U/L
CO2 SERPL-SCNC: 22 MMOL/L
CREAT SERPL-MCNC: 1.42 MG/DL
EOSINOPHIL # BLD AUTO: 0.03 K/UL
EOSINOPHIL NFR BLD AUTO: 0.7 %
ERYTHROCYTE [SEDIMENTATION RATE] IN BLOOD BY WESTERGREN METHOD: 13 MM/HR
GLUCOSE SERPL-MCNC: 84 MG/DL
HCT VFR BLD CALC: 48.4 %
HDLC SERPL-MCNC: 49 MG/DL
HGB BLD-MCNC: 14.8 G/DL
IMM GRANULOCYTES NFR BLD AUTO: 0.2 %
LDLC SERPL CALC-MCNC: 136 MG/DL
LYMPHOCYTES # BLD AUTO: 1.79 K/UL
LYMPHOCYTES NFR BLD AUTO: 39.2 %
MAN DIFF?: NORMAL
MCHC RBC-ENTMCNC: 28.5 PG
MCHC RBC-ENTMCNC: 30.6 GM/DL
MCV RBC AUTO: 93.3 FL
MONOCYTES # BLD AUTO: 0.37 K/UL
MONOCYTES NFR BLD AUTO: 8.1 %
NEUTROPHILS # BLD AUTO: 2.36 K/UL
NEUTROPHILS NFR BLD AUTO: 51.6 %
PLATELET # BLD AUTO: 141 K/UL
POTASSIUM SERPL-SCNC: 4.9 MMOL/L
PROT SERPL-MCNC: 6.6 G/DL
RBC # BLD: 5.19 M/UL
RBC # FLD: 14.9 %
SODIUM SERPL-SCNC: 140 MMOL/L
TESTOST SERPL-MCNC: 876.5 NG/DL
TRIGL SERPL-MCNC: 148 MG/DL
WBC # FLD AUTO: 4.57 K/UL

## 2018-09-10 ENCOUNTER — APPOINTMENT (OUTPATIENT)
Dept: INTERNAL MEDICINE | Facility: CLINIC | Age: 63
End: 2018-09-10
Payer: COMMERCIAL

## 2018-09-10 PROCEDURE — 36415 COLL VENOUS BLD VENIPUNCTURE: CPT

## 2018-09-11 LAB
25(OH)D3 SERPL-MCNC: 29.3 NG/ML
ALBUMIN SERPL ELPH-MCNC: 4.5 G/DL
ALP BLD-CCNC: 56 U/L
ALT SERPL-CCNC: 14 U/L
ANION GAP SERPL CALC-SCNC: 12 MMOL/L
AST SERPL-CCNC: 20 U/L
BASOPHILS # BLD AUTO: 0.01 K/UL
BASOPHILS NFR BLD AUTO: 0.2 %
BILIRUB SERPL-MCNC: 0.4 MG/DL
BUN SERPL-MCNC: 15 MG/DL
CALCIUM SERPL-MCNC: 9.6 MG/DL
CHLORIDE SERPL-SCNC: 104 MMOL/L
CHOLEST SERPL-MCNC: 216 MG/DL
CHOLEST/HDLC SERPL: 5.5 RATIO
CK SERPL-CCNC: 106 U/L
CO2 SERPL-SCNC: 24 MMOL/L
CREAT SERPL-MCNC: 1.31 MG/DL
EOSINOPHIL # BLD AUTO: 0.07 K/UL
EOSINOPHIL NFR BLD AUTO: 1.3 %
ERYTHROCYTE [SEDIMENTATION RATE] IN BLOOD BY WESTERGREN METHOD: 11 MM/HR
GLUCOSE SERPL-MCNC: 93 MG/DL
HBA1C MFR BLD HPLC: 5.7 %
HCT VFR BLD CALC: 45.2 %
HDLC SERPL-MCNC: 39 MG/DL
HGB BLD-MCNC: 14 G/DL
IMM GRANULOCYTES NFR BLD AUTO: 0 %
LDLC SERPL CALC-MCNC: 128 MG/DL
LYMPHOCYTES # BLD AUTO: 2.45 K/UL
LYMPHOCYTES NFR BLD AUTO: 46.2 %
MAN DIFF?: NORMAL
MCHC RBC-ENTMCNC: 28.2 PG
MCHC RBC-ENTMCNC: 31 GM/DL
MCV RBC AUTO: 90.9 FL
MONOCYTES # BLD AUTO: 0.48 K/UL
MONOCYTES NFR BLD AUTO: 9.1 %
NEUTROPHILS # BLD AUTO: 2.29 K/UL
NEUTROPHILS NFR BLD AUTO: 43.2 %
PLATELET # BLD AUTO: 143 K/UL
POTASSIUM SERPL-SCNC: 4.9 MMOL/L
PROT SERPL-MCNC: 6.8 G/DL
PSA SERPL-MCNC: 1.44 NG/ML
RBC # BLD: 4.97 M/UL
RBC # FLD: 14.2 %
SODIUM SERPL-SCNC: 141 MMOL/L
TESTOST SERPL-MCNC: 720.2 NG/DL
TRIGL SERPL-MCNC: 243 MG/DL
WBC # FLD AUTO: 5.3 K/UL

## 2018-09-12 LAB — ALDOLASE SERPL-CCNC: 8.4 U/L

## 2018-09-14 LAB — MERCURY BLD-MCNC: 12.8 UG/L

## 2018-09-20 DIAGNOSIS — J32.8 OTHER CHRONIC SINUSITIS: ICD-10-CM

## 2018-09-28 PROBLEM — J32.8 OTHER CHRONIC SINUSITIS: Status: ACTIVE | Noted: 2017-02-13

## 2018-10-01 LAB
ALBUMIN SERPL ELPH-MCNC: 4.9 G/DL
ALP BLD-CCNC: 62 U/L
ALT SERPL-CCNC: 18 U/L
ANION GAP SERPL CALC-SCNC: 16 MMOL/L
AST SERPL-CCNC: 21 U/L
BASOPHILS # BLD AUTO: 0.01 K/UL
BASOPHILS NFR BLD AUTO: 0.2 %
BILIRUB SERPL-MCNC: 0.7 MG/DL
BUN SERPL-MCNC: 18 MG/DL
CALCIUM SERPL-MCNC: 9.5 MG/DL
CHLORIDE SERPL-SCNC: 102 MMOL/L
CO2 SERPL-SCNC: 25 MMOL/L
CREAT SERPL-MCNC: 1.37 MG/DL
EOSINOPHIL # BLD AUTO: 0.05 K/UL
EOSINOPHIL NFR BLD AUTO: 0.8 %
ERYTHROCYTE [SEDIMENTATION RATE] IN BLOOD BY WESTERGREN METHOD: 21 MM/HR
GLUCOSE SERPL-MCNC: 87 MG/DL
HCT VFR BLD CALC: 47.7 %
HGB BLD-MCNC: 15.1 G/DL
IMM GRANULOCYTES NFR BLD AUTO: 0.3 %
LYMPHOCYTES # BLD AUTO: 1.87 K/UL
LYMPHOCYTES NFR BLD AUTO: 28.9 %
MAN DIFF?: NORMAL
MCHC RBC-ENTMCNC: 28.3 PG
MCHC RBC-ENTMCNC: 31.7 GM/DL
MCV RBC AUTO: 89.5 FL
MONOCYTES # BLD AUTO: 0.43 K/UL
MONOCYTES NFR BLD AUTO: 6.6 %
NEUTROPHILS # BLD AUTO: 4.1 K/UL
NEUTROPHILS NFR BLD AUTO: 63.2 %
PLATELET # BLD AUTO: 165 K/UL
POTASSIUM SERPL-SCNC: 4.7 MMOL/L
PROT SERPL-MCNC: 7.3 G/DL
RBC # BLD: 5.33 M/UL
RBC # FLD: 14 %
SODIUM SERPL-SCNC: 143 MMOL/L
TESTOST SERPL-MCNC: 654.5 NG/DL
WBC # FLD AUTO: 6.48 K/UL

## 2018-10-10 ENCOUNTER — MOBILE ON CALL (OUTPATIENT)
Age: 63
End: 2018-10-10

## 2018-10-12 LAB
ALBUMIN SERPL ELPH-MCNC: 4.1 G/DL
ALP BLD-CCNC: 59 U/L
ALT SERPL-CCNC: 19 U/L
ANION GAP SERPL CALC-SCNC: 14 MMOL/L
AST SERPL-CCNC: 19 U/L
BASOPHILS # BLD AUTO: 0.01 K/UL
BASOPHILS NFR BLD AUTO: 0.2 %
BILIRUB SERPL-MCNC: 0.5 MG/DL
BUN SERPL-MCNC: 21 MG/DL
CALCIUM SERPL-MCNC: 9.1 MG/DL
CHLORIDE SERPL-SCNC: 107 MMOL/L
CHOLEST SERPL-MCNC: 201 MG/DL
CHOLEST/HDLC SERPL: 4.5 RATIO
CO2 SERPL-SCNC: 21 MMOL/L
CREAT SERPL-MCNC: 1.29 MG/DL
EOSINOPHIL # BLD AUTO: 0.07 K/UL
EOSINOPHIL NFR BLD AUTO: 1.3 %
GLUCOSE SERPL-MCNC: 120 MG/DL
HCT VFR BLD CALC: 43.2 %
HDLC SERPL-MCNC: 45 MG/DL
HGB BLD-MCNC: 13.9 G/DL
IMM GRANULOCYTES NFR BLD AUTO: 0.4 %
LDLC SERPL CALC-MCNC: 112 MG/DL
LYMPHOCYTES # BLD AUTO: 1.69 K/UL
LYMPHOCYTES NFR BLD AUTO: 32.2 %
MAN DIFF?: NORMAL
MCHC RBC-ENTMCNC: 29 PG
MCHC RBC-ENTMCNC: 32.2 GM/DL
MCV RBC AUTO: 90 FL
MONOCYTES # BLD AUTO: 0.43 K/UL
MONOCYTES NFR BLD AUTO: 8.2 %
NEUTROPHILS # BLD AUTO: 3.03 K/UL
NEUTROPHILS NFR BLD AUTO: 57.7 %
PLATELET # BLD AUTO: 128 K/UL
POTASSIUM SERPL-SCNC: 4.7 MMOL/L
PROT SERPL-MCNC: 6.2 G/DL
RBC # BLD: 4.8 M/UL
RBC # FLD: 14.8 %
SODIUM SERPL-SCNC: 142 MMOL/L
TESTOST SERPL-MCNC: 530.4 NG/DL
TRIGL SERPL-MCNC: 222 MG/DL
WBC # FLD AUTO: 5.25 K/UL

## 2018-10-15 LAB — UREA BREATH TEST QL: NEGATIVE

## 2018-10-16 ENCOUNTER — FORM ENCOUNTER (OUTPATIENT)
Age: 63
End: 2018-10-16

## 2018-10-16 LAB — NMDA RECEPTOR AB N-METHYL-D-ASPARTATE RECEPTOR AB IGG, SERUM WITH REFLEX TO TITER: NORMAL

## 2018-10-17 ENCOUNTER — OUTPATIENT (OUTPATIENT)
Dept: OUTPATIENT SERVICES | Facility: HOSPITAL | Age: 63
LOS: 1 days | End: 2018-10-17
Payer: COMMERCIAL

## 2018-10-17 ENCOUNTER — FORM ENCOUNTER (OUTPATIENT)
Age: 63
End: 2018-10-17

## 2018-10-17 LAB
H PYLORI AB SER-ACNC: <5 UNITS
H PYLORI IGA SER-ACNC: 17.9 UNITS

## 2018-10-17 PROCEDURE — 82962 GLUCOSE BLOOD TEST: CPT

## 2018-10-17 PROCEDURE — 78608 BRAIN IMAGING (PET): CPT

## 2018-10-17 PROCEDURE — A9552: CPT

## 2018-10-17 PROCEDURE — 78608 BRAIN IMAGING (PET): CPT | Mod: 26

## 2018-10-18 ENCOUNTER — OUTPATIENT (OUTPATIENT)
Dept: OUTPATIENT SERVICES | Facility: HOSPITAL | Age: 63
LOS: 1 days | End: 2018-10-18
Payer: COMMERCIAL

## 2018-10-18 ENCOUNTER — MEDICATION RENEWAL (OUTPATIENT)
Age: 63
End: 2018-10-18

## 2018-10-18 PROCEDURE — 70551 MRI BRAIN STEM W/O DYE: CPT

## 2018-10-18 PROCEDURE — 70551 MRI BRAIN STEM W/O DYE: CPT | Mod: 26

## 2018-10-22 ENCOUNTER — OTHER (OUTPATIENT)
Age: 63
End: 2018-10-22

## 2018-10-22 ENCOUNTER — LABORATORY RESULT (OUTPATIENT)
Age: 63
End: 2018-10-22

## 2018-10-23 ENCOUNTER — LABORATORY RESULT (OUTPATIENT)
Age: 63
End: 2018-10-23

## 2018-10-23 ENCOUNTER — RX RENEWAL (OUTPATIENT)
Age: 63
End: 2018-10-23

## 2018-10-23 LAB
APTT BLD: 32.8 SEC
BASOPHILS NFR BLD AUTO: 0.2 %
CK SERPL-CCNC: 86 U/L
EOSINOPHIL NFR BLD AUTO: 2.1 %
ERYTHROCYTE [SEDIMENTATION RATE] IN BLOOD BY WESTERGREN METHOD: 7 MM/HR
HCT VFR BLD CALC: 42.8 %
HGB BLD-MCNC: 13.6 G/DL
INR PPP: 1.03
LYMPHOCYTES NFR BLD AUTO: 43 %
MAN DIFF?: NO
MCHC RBC-ENTMCNC: 28.2 PG
MCHC RBC-ENTMCNC: 31.8 G/DL
MCV RBC AUTO: 88.6 FL
MONOCYTES NFR BLD AUTO: 11.7 %
NEUTROPHILS NFR BLD AUTO: 43 %
PLATELET # BLD AUTO: 146 K/UL
PSA FREE FLD-MCNC: 26.9
PSA FREE SERPL-MCNC: 0.32 NG/ML
PSA SERPL-MCNC: 1.19 NG/ML
PT BLD: 11.4 SEC
RBC # BLD: 4.83 M/UL
RBC # FLD: 13.8 %
TSH SERPL-ACNC: 0.3 UIU/ML
WBC # FLD AUTO: 4.3 K/UL

## 2018-10-26 ENCOUNTER — RX RENEWAL (OUTPATIENT)
Age: 63
End: 2018-10-26

## 2018-10-29 ENCOUNTER — APPOINTMENT (OUTPATIENT)
Dept: INTERVENTIONAL RADIOLOGY/VASCULAR | Facility: HOSPITAL | Age: 63
End: 2018-10-29

## 2018-10-29 ENCOUNTER — OUTPATIENT (OUTPATIENT)
Dept: OUTPATIENT SERVICES | Facility: HOSPITAL | Age: 63
LOS: 1 days | End: 2018-10-29
Payer: COMMERCIAL

## 2018-10-29 LAB
APPEARANCE CSF: CLEAR — SIGNIFICANT CHANGE UP
APPEARANCE SPUN FLD: COLORLESS — SIGNIFICANT CHANGE UP
COLOR CSF: SIGNIFICANT CHANGE UP
CSF PCR RESULT: SIGNIFICANT CHANGE UP
GLUCOSE CSF-MCNC: 60 MG/DL — SIGNIFICANT CHANGE UP (ref 40–70)
NEUTROPHILS # CSF: 0 % — SIGNIFICANT CHANGE UP (ref 0–6)
NRBC NFR CSF: 0 /UL — SIGNIFICANT CHANGE UP (ref 0–5)
PROT CSF-MCNC: 20 MG/DL — SIGNIFICANT CHANGE UP (ref 15–45)
RBC # CSF: 0 /UL — SIGNIFICANT CHANGE UP (ref 0–0)
TUBE TYPE: SIGNIFICANT CHANGE UP

## 2018-10-29 PROCEDURE — 87483 CNS DNA AMP PROBE TYPE 12-25: CPT

## 2018-10-29 PROCEDURE — 62270 DX LMBR SPI PNXR: CPT

## 2018-10-29 PROCEDURE — 82945 GLUCOSE OTHER FLUID: CPT

## 2018-10-29 PROCEDURE — 84157 ASSAY OF PROTEIN OTHER: CPT

## 2018-10-29 PROCEDURE — 99152 MOD SED SAME PHYS/QHP 5/>YRS: CPT

## 2018-10-29 PROCEDURE — 77003 FLUOROGUIDE FOR SPINE INJECT: CPT | Mod: 26

## 2018-10-29 PROCEDURE — 87529 HSV DNA AMP PROBE: CPT

## 2018-10-29 PROCEDURE — 83916 OLIGOCLONAL BANDS: CPT

## 2018-10-29 PROCEDURE — 86592 SYPHILIS TEST NON-TREP QUAL: CPT

## 2018-10-29 PROCEDURE — 99153 MOD SED SAME PHYS/QHP EA: CPT

## 2018-10-29 PROCEDURE — 89051 BODY FLUID CELL COUNT: CPT

## 2018-10-29 PROCEDURE — 77003 FLUOROGUIDE FOR SPINE INJECT: CPT

## 2018-10-29 PROCEDURE — 87496 CYTOMEG DNA AMP PROBE: CPT

## 2018-10-30 ENCOUNTER — RX RENEWAL (OUTPATIENT)
Age: 63
End: 2018-10-30

## 2018-10-30 LAB
ALBUMIN CSF-MCNC: 14.2 MG/DL — SIGNIFICANT CHANGE UP (ref 14–25)
ALBUMIN MFR SERPL ELPH: 62.8 %
ALBUMIN SERPL ELPH-MCNC: 3858 MG/DL — SIGNIFICANT CHANGE UP (ref 3500–5200)
ALBUMIN SERPL-MCNC: 4.2 G/DL
ALBUMIN/GLOB SERPL: 1.7 RATIO
ALPHA1 GLOB MFR SERPL ELPH: 5.4 %
ALPHA1 GLOB SERPL ELPH-MCNC: 0.4 G/DL
ALPHA2 GLOB MFR SERPL ELPH: 9.8 %
ALPHA2 GLOB SERPL ELPH-MCNC: 0.7 G/DL
B-GLOBULIN MFR SERPL ELPH: 11 %
B-GLOBULIN SERPL ELPH-MCNC: 0.7 G/DL
GAMMA GLOB FLD ELPH-MCNC: 0.7 G/DL
GAMMA GLOB MFR SERPL ELPH: 11 %
IGG CSF-MCNC: 1.5 MG/DL — SIGNIFICANT CHANGE UP
IGG FLD-MCNC: 745 MG/DL — SIGNIFICANT CHANGE UP (ref 610–1660)
IGG SYNTH RATE SER+CSF CALC-MRATE: -1.5 MG/DAY — SIGNIFICANT CHANGE UP
IGG/ALB CLEAR SER+CSF-RTO: 0.5 — SIGNIFICANT CHANGE UP
IGG/ALB CSF: 0.11 RATIO — SIGNIFICANT CHANGE UP
IGG/ALB SER: 0.19 RATIO — SIGNIFICANT CHANGE UP
INTERPRETATION SERPL IEP-IMP: NORMAL
LABORATORY COMMENT REPORT: SIGNIFICANT CHANGE UP
M PROTEIN SPEC IFE-MCNC: NORMAL
PROT SERPL-MCNC: 6.7 G/DL
PROT SERPL-MCNC: 6.7 G/DL
SOURCE HSV 1/2: SIGNIFICANT CHANGE UP

## 2018-10-31 LAB
CMV DNA # UR NAA DL=200: SIGNIFICANT CHANGE UP
CMV DNA SPEC QL NAA+PROBE: SIGNIFICANT CHANGE UP
OLIGOCLONAL BANDS CSF ELPH-IMP: SIGNIFICANT CHANGE UP
SOURCE CMV: SIGNIFICANT CHANGE UP
VDRL CSF-TITR: NEGATIVE — SIGNIFICANT CHANGE UP

## 2018-11-01 ENCOUNTER — EMERGENCY (EMERGENCY)
Facility: HOSPITAL | Age: 63
LOS: 1 days | Discharge: HOME | End: 2018-11-01
Attending: EMERGENCY MEDICINE | Admitting: EMERGENCY MEDICINE
Payer: COMMERCIAL

## 2018-11-01 VITALS
HEART RATE: 65 BPM | WEIGHT: 149.91 LBS | DIASTOLIC BLOOD PRESSURE: 77 MMHG | OXYGEN SATURATION: 99 % | RESPIRATION RATE: 20 BRPM | TEMPERATURE: 98 F | SYSTOLIC BLOOD PRESSURE: 148 MMHG

## 2018-11-01 VITALS — TEMPERATURE: 98 F

## 2018-11-01 DIAGNOSIS — Z79.891 LONG TERM (CURRENT) USE OF OPIATE ANALGESIC: ICD-10-CM

## 2018-11-01 DIAGNOSIS — Z88.8 ALLERGY STATUS TO OTHER DRUGS, MEDICAMENTS AND BIOLOGICAL SUBSTANCES: ICD-10-CM

## 2018-11-01 DIAGNOSIS — R51 HEADACHE: ICD-10-CM

## 2018-11-01 DIAGNOSIS — Z79.899 OTHER LONG TERM (CURRENT) DRUG THERAPY: ICD-10-CM

## 2018-11-01 DIAGNOSIS — Z88.6 ALLERGY STATUS TO ANALGESIC AGENT: ICD-10-CM

## 2018-11-01 LAB
ALBUMIN SERPL ELPH-MCNC: 4.1 G/DL — SIGNIFICANT CHANGE UP (ref 3.3–5)
ALP SERPL-CCNC: 54 U/L — SIGNIFICANT CHANGE UP (ref 40–120)
ALT FLD-CCNC: 14 U/L — SIGNIFICANT CHANGE UP (ref 10–45)
ANION GAP SERPL CALC-SCNC: 9 MMOL/L — SIGNIFICANT CHANGE UP (ref 5–17)
APTT BLD: 31.9 SEC — SIGNIFICANT CHANGE UP (ref 27.5–36.3)
AST SERPL-CCNC: 15 U/L — SIGNIFICANT CHANGE UP (ref 10–40)
BILIRUB SERPL-MCNC: 0.3 MG/DL — SIGNIFICANT CHANGE UP (ref 0.2–1.2)
BUN SERPL-MCNC: 16 MG/DL — SIGNIFICANT CHANGE UP (ref 7–23)
CALCIUM SERPL-MCNC: 9.5 MG/DL — SIGNIFICANT CHANGE UP (ref 8.4–10.5)
CHLORIDE SERPL-SCNC: 103 MMOL/L — SIGNIFICANT CHANGE UP (ref 96–108)
CO2 SERPL-SCNC: 26 MMOL/L — SIGNIFICANT CHANGE UP (ref 22–31)
CREAT SERPL-MCNC: 1.48 MG/DL — HIGH (ref 0.5–1.3)
GLUCOSE SERPL-MCNC: 95 MG/DL — SIGNIFICANT CHANGE UP (ref 70–99)
HCT VFR BLD CALC: 41.2 % — SIGNIFICANT CHANGE UP (ref 39–50)
HGB BLD-MCNC: 13.5 G/DL — SIGNIFICANT CHANGE UP (ref 13–17)
INR BLD: 1.02 — SIGNIFICANT CHANGE UP (ref 0.88–1.16)
MCHC RBC-ENTMCNC: 28.3 PG — SIGNIFICANT CHANGE UP (ref 27–34)
MCHC RBC-ENTMCNC: 32.8 G/DL — SIGNIFICANT CHANGE UP (ref 32–36)
MCV RBC AUTO: 86.4 FL — SIGNIFICANT CHANGE UP (ref 80–100)
PLATELET # BLD AUTO: 132 K/UL — LOW (ref 150–400)
POTASSIUM SERPL-MCNC: 4.7 MMOL/L — SIGNIFICANT CHANGE UP (ref 3.5–5.3)
POTASSIUM SERPL-SCNC: 4.7 MMOL/L — SIGNIFICANT CHANGE UP (ref 3.5–5.3)
PROT SERPL-MCNC: 6.3 G/DL — SIGNIFICANT CHANGE UP (ref 6–8.3)
PROTHROM AB SERPL-ACNC: 11.5 SEC — SIGNIFICANT CHANGE UP (ref 10–12.9)
RBC # BLD: 4.77 M/UL — SIGNIFICANT CHANGE UP (ref 4.2–5.8)
RBC # FLD: 13.5 % — SIGNIFICANT CHANGE UP (ref 10.3–16.9)
SODIUM SERPL-SCNC: 138 MMOL/L — SIGNIFICANT CHANGE UP (ref 135–145)
WBC # BLD: 3.8 K/UL — SIGNIFICANT CHANGE UP (ref 3.8–10.5)
WBC # FLD AUTO: 3.8 K/UL — SIGNIFICANT CHANGE UP (ref 3.8–10.5)

## 2018-11-01 PROCEDURE — 80053 COMPREHEN METABOLIC PANEL: CPT

## 2018-11-01 PROCEDURE — 36415 COLL VENOUS BLD VENIPUNCTURE: CPT

## 2018-11-01 PROCEDURE — 85610 PROTHROMBIN TIME: CPT

## 2018-11-01 PROCEDURE — 96376 TX/PRO/DX INJ SAME DRUG ADON: CPT

## 2018-11-01 PROCEDURE — 85027 COMPLETE CBC AUTOMATED: CPT

## 2018-11-01 PROCEDURE — 99284 EMERGENCY DEPT VISIT MOD MDM: CPT

## 2018-11-01 PROCEDURE — 96361 HYDRATE IV INFUSION ADD-ON: CPT

## 2018-11-01 PROCEDURE — 85730 THROMBOPLASTIN TIME PARTIAL: CPT

## 2018-11-01 PROCEDURE — 96374 THER/PROPH/DIAG INJ IV PUSH: CPT

## 2018-11-01 PROCEDURE — 99284 EMERGENCY DEPT VISIT MOD MDM: CPT | Mod: 25

## 2018-11-01 RX ORDER — MIDAZOLAM HYDROCHLORIDE 1 MG/ML
2 INJECTION, SOLUTION INTRAMUSCULAR; INTRAVENOUS ONCE
Qty: 0 | Refills: 0 | Status: DISCONTINUED | OUTPATIENT
Start: 2018-11-01 | End: 2018-11-01

## 2018-11-01 RX ORDER — MIDAZOLAM HYDROCHLORIDE 1 MG/ML
1 INJECTION, SOLUTION INTRAMUSCULAR; INTRAVENOUS ONCE
Qty: 0 | Refills: 0 | Status: DISCONTINUED | OUTPATIENT
Start: 2018-11-01 | End: 2018-11-01

## 2018-11-01 RX ORDER — SODIUM CHLORIDE 9 MG/ML
1000 INJECTION INTRAMUSCULAR; INTRAVENOUS; SUBCUTANEOUS ONCE
Qty: 0 | Refills: 0 | Status: COMPLETED | OUTPATIENT
Start: 2018-11-01 | End: 2018-11-01

## 2018-11-01 RX ADMIN — SODIUM CHLORIDE 1000 MILLILITER(S): 9 INJECTION INTRAMUSCULAR; INTRAVENOUS; SUBCUTANEOUS at 12:00

## 2018-11-01 RX ADMIN — MIDAZOLAM HYDROCHLORIDE 2 MILLIGRAM(S): 1 INJECTION, SOLUTION INTRAMUSCULAR; INTRAVENOUS at 12:59

## 2018-11-01 RX ADMIN — MIDAZOLAM HYDROCHLORIDE 1 MILLIGRAM(S): 1 INJECTION, SOLUTION INTRAMUSCULAR; INTRAVENOUS at 13:54

## 2018-11-01 RX ADMIN — SODIUM CHLORIDE 1000 MILLILITER(S): 9 INJECTION INTRAMUSCULAR; INTRAVENOUS; SUBCUTANEOUS at 14:49

## 2018-11-01 RX ADMIN — SODIUM CHLORIDE 1000 MILLILITER(S): 9 INJECTION INTRAMUSCULAR; INTRAVENOUS; SUBCUTANEOUS at 13:13

## 2018-11-01 NOTE — ED ADULT TRIAGE NOTE - CHIEF COMPLAINT QUOTE
pt c/o Headache x 3 days s/p lumbar puncture on Monday.  referred to ED by PCP for blood path. pt c/o pain 8/10 at present time, able to move all extremities, without sensory deficits. no facial droop noted, speaking clear and in full sentences. MD Balbuena at bedside.

## 2018-11-01 NOTE — ED ADULT NURSE NOTE - CHPI ED NUR SYMPTOMS NEG
no fever/no nausea/no numbness/no vomiting/no loss of consciousness/no change in level of consciousness/no dizziness/no confusion/no weakness

## 2018-11-01 NOTE — ED PROVIDER NOTE - PROGRESS NOTE DETAILS
appreciate anesthesia involvement for blood patch, pt given versed on request by anesthesia, has rec'd medication before w/o any issue. pt obs'd in ED for 2 hours after blood patch with significant relief.

## 2018-11-01 NOTE — ED PROVIDER NOTE - MEDICAL DECISION MAKING DETAILS
63M with concern for post lumbar puncture headache here for blood patch, pt has nl neuro exam nl vital signs overall well appearing. Anesthesia consulted for blood patch, pt obs'd in ED with improvement of sx given versed for pain control as requested by pt and anesthesia prior to procedure discharged home.

## 2018-11-01 NOTE — ED PROVIDER NOTE - PHYSICAL EXAMINATION
General: NAD, alert, conversant, comfortable-appearing  Head: ncat  Eyes: clear, pupils round  Thoat: MMM, oropharynx clear  Neck: supple, no large masses, no meningismus  CV: RRR no murmurs  Resp: CTAB no w/c/r  Abd: nontender, no rebound/guarding  Back: no c-s ttp  Ext: no lower ext swelling, 2+ dp/pt pulses sensation intact to light touch l4/l5/s1  Neuro: alert and oriented x 3, cn ii-xii grossly intact, strength 5/5 in bl ue bl le, sensation intact to light touch throughout f/a/l, gait steady, fnf/hts intact bl, finger taps/foot taps intact bl.

## 2018-11-01 NOTE — ED PROVIDER NOTE - OBJECTIVE STATEMENT
63M with hx of lumbar puncture 4 days prior for autoimmune disease presenting with headache for 4 days worse with lying flat, no severe or sudden onset, no vision changes, no double vision, no neck stiffness no fever. Concern for post lumbar puncture headache, here for blood patch. no f/a/l weakness or numbness.

## 2018-11-01 NOTE — ED ADULT NURSE NOTE - OBJECTIVE STATEMENT
patient presents c/o headache since lumbar puncture procedure on Monday. Patient was sent to ED for blood patch.

## 2018-11-01 NOTE — CONSULT NOTE ADULT - SUBJECTIVE AND OBJECTIVE BOX
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  The patient developed a headache which started very soon after the lumbar puncture which he describes as diffuse, an 8/10 with sitting (3/10 upon supine position).  He has no associated nausea, vomiting, diplopia or other neurologic complaints.    Informed written consent was obtained from the patient; risks of infection, worsened headache from dural puncture, nerve injury was disclosed.    The patient was placed in a sitting position; /82, 54, SpO2 100.    The back was prepped with betadine X 3 and strict sterile technique was observed.  An epidural was performed at the same level as the previous LP (?L4/5).  MONISAH was obtained easily with MONISHA at 4cm.    Homologous blood was obtained under strerile technique and injected until a volume of 48ml was injected and HA had resolved to less than 1/10.  The patient denied backache or pain during the injection (and even upon conclusion of injection).    Post VS:  144/68, 53, SpO2 100.    The patient was advised to remain flat for 2 hours duration and he was given the 24 hour anesthesia line to call in the event of emergency (or to return to the ED).    LOT of epidural tray:  [id number]  EXP 2019-10-01

## 2018-11-12 ENCOUNTER — APPOINTMENT (OUTPATIENT)
Dept: INTERNAL MEDICINE | Facility: CLINIC | Age: 63
End: 2018-11-12
Payer: COMMERCIAL

## 2018-11-12 DIAGNOSIS — Z23 ENCOUNTER FOR IMMUNIZATION: ICD-10-CM

## 2018-11-12 PROBLEM — D89.89 OTHER SPECIFIED DISORDERS INVOLVING THE IMMUNE MECHANISM, NOT ELSEWHERE CLASSIFIED: Chronic | Status: ACTIVE | Noted: 2018-11-01

## 2018-11-12 PROCEDURE — G0008: CPT | Mod: 26

## 2018-11-12 PROCEDURE — 90472 IMMUNIZATION ADMIN EACH ADD: CPT

## 2018-11-12 PROCEDURE — 90686 IIV4 VACC NO PRSV 0.5 ML IM: CPT

## 2018-11-20 LAB — MISCELLANEOUS TEST NAME: SIGNIFICANT CHANGE UP

## 2018-11-21 LAB — MISCELLANEOUS TEST NAME: SIGNIFICANT CHANGE UP

## 2018-11-24 ENCOUNTER — MOBILE ON CALL (OUTPATIENT)
Age: 63
End: 2018-11-24

## 2018-11-29 ENCOUNTER — APPOINTMENT (OUTPATIENT)
Dept: NEUROLOGY | Facility: CLINIC | Age: 63
End: 2018-11-29
Payer: COMMERCIAL

## 2018-11-29 PROCEDURE — 99212 OFFICE O/P EST SF 10 MIN: CPT

## 2018-12-06 DIAGNOSIS — R79.89 OTHER SPECIFIED ABNORMAL FINDINGS OF BLOOD CHEMISTRY: ICD-10-CM

## 2018-12-06 DIAGNOSIS — Z01.812 ENCOUNTER FOR PREPROCEDURAL LABORATORY EXAMINATION: ICD-10-CM

## 2018-12-06 DIAGNOSIS — Z86.69 PERSONAL HISTORY OF OTHER DISEASES OF THE NERVOUS SYSTEM AND SENSE ORGANS: ICD-10-CM

## 2019-01-07 PROBLEM — Z86.69 HISTORY OF ACUTE CONJUNCTIVITIS: Status: RESOLVED | Noted: 2018-11-24 | Resolved: 2019-01-07

## 2019-01-07 PROBLEM — Z01.812 PRE-PROCEDURE LAB EXAM: Status: RESOLVED | Noted: 2018-10-22 | Resolved: 2019-01-07

## 2019-01-07 PROBLEM — R79.89 BLOOD CREATININE INCREASED COMPARED WITH PRIOR MEASUREMENT: Status: ACTIVE | Noted: 2017-04-20

## 2019-01-08 LAB
25(OH)D3 SERPL-MCNC: 32.5 NG/ML
ALBUMIN SERPL ELPH-MCNC: 4.9 G/DL
ALP BLD-CCNC: 69 U/L
ALT SERPL-CCNC: 14 U/L
ANION GAP SERPL CALC-SCNC: 14 MMOL/L
AST SERPL-CCNC: 19 U/L
BASOPHILS # BLD AUTO: 0 K/UL
BASOPHILS NFR BLD AUTO: 0 %
BILIRUB SERPL-MCNC: 0.4 MG/DL
BUN SERPL-MCNC: 22 MG/DL
CALCIUM SERPL-MCNC: 9.9 MG/DL
CHLORIDE SERPL-SCNC: 105 MMOL/L
CHOLEST SERPL-MCNC: 263 MG/DL
CHOLEST/HDLC SERPL: 5.7 RATIO
CK SERPL-CCNC: 87 U/L
CO2 SERPL-SCNC: 23 MMOL/L
CREAT SERPL-MCNC: 1.4 MG/DL
EOSINOPHIL # BLD AUTO: 0.1 K/UL
EOSINOPHIL NFR BLD AUTO: 1.8 %
FOLATE SERPL-MCNC: 16.5 NG/ML
GLUCOSE SERPL-MCNC: 90 MG/DL
HCT VFR BLD CALC: 48.3 %
HDLC SERPL-MCNC: 46 MG/DL
HGB BLD-MCNC: 14.7 G/DL
IMM GRANULOCYTES NFR BLD AUTO: 0.2 %
LDLC SERPL CALC-MCNC: 173 MG/DL
LYMPHOCYTES # BLD AUTO: 2.66 K/UL
LYMPHOCYTES NFR BLD AUTO: 46.7 %
MAN DIFF?: NORMAL
MCHC RBC-ENTMCNC: 28.1 PG
MCHC RBC-ENTMCNC: 30.4 GM/DL
MCV RBC AUTO: 92.2 FL
MONOCYTES # BLD AUTO: 0.5 K/UL
MONOCYTES NFR BLD AUTO: 8.8 %
NEUTROPHILS # BLD AUTO: 2.42 K/UL
NEUTROPHILS NFR BLD AUTO: 42.5 %
PLATELET # BLD AUTO: 158 K/UL
POTASSIUM SERPL-SCNC: 4.8 MMOL/L
PROT SERPL-MCNC: 7.4 G/DL
PSA FREE SERPL-MCNC: 0.38 NG/ML
RBC # BLD: 5.24 M/UL
RBC # FLD: 15.1 %
SODIUM SERPL-SCNC: 142 MMOL/L
T4 FREE SERPL-MCNC: 0.8 NG/DL
TRIGL SERPL-MCNC: 222 MG/DL
TSH SERPL-ACNC: 2.46 UIU/ML
VIT B12 SERPL-MCNC: 409 PG/ML
WBC # FLD AUTO: 5.69 K/UL

## 2019-01-10 LAB
ALDOLASE SERPL-CCNC: 7.4 U/L
MERCURY BLD-MCNC: 10 UG/L

## 2019-01-18 LAB
TESTOST BND SERPL-MCNC: 4.6 PG/ML
TESTOST SERPL-MCNC: 693.9 NG/DL

## 2019-01-31 ENCOUNTER — RX RENEWAL (OUTPATIENT)
Age: 64
End: 2019-01-31

## 2019-01-31 DIAGNOSIS — J45.909 UNSPECIFIED ASTHMA, UNCOMPLICATED: ICD-10-CM

## 2019-02-12 ENCOUNTER — APPOINTMENT (OUTPATIENT)
Dept: INTERNAL MEDICINE | Facility: CLINIC | Age: 64
End: 2019-02-12
Payer: COMMERCIAL

## 2019-02-12 DIAGNOSIS — Z23 ENCOUNTER FOR IMMUNIZATION: ICD-10-CM

## 2019-02-12 DIAGNOSIS — N52.9 MALE ERECTILE DYSFUNCTION, UNSPECIFIED: ICD-10-CM

## 2019-02-12 PROCEDURE — G0008: CPT

## 2019-02-12 PROCEDURE — 90662 IIV NO PRSV INCREASED AG IM: CPT

## 2019-02-14 PROBLEM — N52.9 ERECTILE DYSFUNCTION OF ORGANIC ORIGIN: Status: ACTIVE | Noted: 2019-02-14

## 2019-03-08 ENCOUNTER — MEDICATION RENEWAL (OUTPATIENT)
Age: 64
End: 2019-03-08

## 2019-03-28 ENCOUNTER — APPOINTMENT (OUTPATIENT)
Dept: INTERNAL MEDICINE | Facility: CLINIC | Age: 64
End: 2019-03-28
Payer: COMMERCIAL

## 2019-03-28 PROCEDURE — 36415 COLL VENOUS BLD VENIPUNCTURE: CPT

## 2019-03-29 LAB
25(OH)D3 SERPL-MCNC: 37.7 NG/ML
BASOPHILS # BLD AUTO: 0.01 K/UL
BASOPHILS NFR BLD AUTO: 0.2 %
CHOLEST SERPL-MCNC: 116 MG/DL
CHOLEST/HDLC SERPL: 2.9 RATIO
EOSINOPHIL # BLD AUTO: 0.08 K/UL
EOSINOPHIL NFR BLD AUTO: 1.3 %
HCT VFR BLD CALC: 42.8 %
HCYS SERPL-MCNC: 11 UMOL/L
HDLC SERPL-MCNC: 40 MG/DL
HGB BLD-MCNC: 13.1 G/DL
IMM GRANULOCYTES NFR BLD AUTO: 0 %
LDLC SERPL CALC-MCNC: 33 MG/DL
LYMPHOCYTES # BLD AUTO: 2.58 K/UL
LYMPHOCYTES NFR BLD AUTO: 43.4 %
MAGNESIUM SERPL-MCNC: 2 MG/DL
MAN DIFF?: NORMAL
MCHC RBC-ENTMCNC: 28.3 PG
MCHC RBC-ENTMCNC: 30.6 GM/DL
MCV RBC AUTO: 92.4 FL
MONOCYTES # BLD AUTO: 0.46 K/UL
MONOCYTES NFR BLD AUTO: 7.7 %
NEUTROPHILS # BLD AUTO: 2.81 K/UL
NEUTROPHILS NFR BLD AUTO: 47.4 %
PLATELET # BLD AUTO: 135 K/UL
PSA SERPL-MCNC: 1.24 NG/ML
RBC # BLD: 4.63 M/UL
RBC # FLD: 14.7 %
TRIGL SERPL-MCNC: 214 MG/DL
WBC # FLD AUTO: 5.94 K/UL

## 2019-04-02 LAB
TESTOST BND SERPL-MCNC: 4.1 PG/ML
TESTOST SERPL-MCNC: 630.6 NG/DL

## 2019-04-15 RX ORDER — ALIROCUMAB 75 MG/ML
75 INJECTION, SOLUTION SUBCUTANEOUS
Qty: 2 | Refills: 0 | Status: DISCONTINUED | COMMUNITY
Start: 2016-09-27 | End: 2019-04-15

## 2019-04-15 RX ORDER — EVOLOCUMAB 140 MG/ML
140 INJECTION, SOLUTION SUBCUTANEOUS
Qty: 6 | Refills: 3 | Status: DISCONTINUED | COMMUNITY
Start: 2019-04-15 | End: 2019-04-15

## 2019-04-23 ENCOUNTER — LABORATORY RESULT (OUTPATIENT)
Age: 64
End: 2019-04-23

## 2019-04-23 ENCOUNTER — APPOINTMENT (OUTPATIENT)
Dept: NEUROLOGY | Facility: CLINIC | Age: 64
End: 2019-04-23

## 2019-04-23 VITALS
OXYGEN SATURATION: 97 % | TEMPERATURE: 97.6 F | DIASTOLIC BLOOD PRESSURE: 73 MMHG | SYSTOLIC BLOOD PRESSURE: 113 MMHG | HEART RATE: 74 BPM

## 2019-04-24 LAB
ALBUMIN SERPL ELPH-MCNC: 4.1 G/DL
ALP BLD-CCNC: 64 U/L
ALT SERPL-CCNC: 20 U/L
ANION GAP SERPL CALC-SCNC: 13 MMOL/L
AST SERPL-CCNC: 21 U/L
BILIRUB SERPL-MCNC: 0.2 MG/DL
BUN SERPL-MCNC: 26 MG/DL
CALCIUM SERPL-MCNC: 8.9 MG/DL
CHLORIDE SERPL-SCNC: 111 MMOL/L
CHOLEST SERPL-MCNC: 110 MG/DL
CHOLEST/HDLC SERPL: 2.9 RATIO
CO2 SERPL-SCNC: 22 MMOL/L
CREAT SERPL-MCNC: 1.31 MG/DL
GLUCOSE SERPL-MCNC: 101 MG/DL
HDLC SERPL-MCNC: 38 MG/DL
LDLC SERPL CALC-MCNC: 28 MG/DL
POTASSIUM SERPL-SCNC: 4.8 MMOL/L
PROT SERPL-MCNC: 6.2 G/DL
SODIUM SERPL-SCNC: 145 MMOL/L
T3RU NFR SERPL: 1.1 TBI
T4 SERPL-MCNC: 5.3 UG/DL
THYROGLOB AB SERPL-ACNC: <20 IU/ML
THYROGLOB SERPL-MCNC: 17.7 NG/ML
THYROPEROXIDASE AB SERPL IA-ACNC: <10 IU/ML
TRIGL SERPL-MCNC: 219 MG/DL
TSH SERPL-ACNC: 2.32 UIU/ML

## 2019-04-25 ENCOUNTER — MEDICATION RENEWAL (OUTPATIENT)
Age: 64
End: 2019-04-25

## 2019-04-25 NOTE — REVIEW OF SYSTEMS
[Patient Intake Form Reviewed] : Patient intake form was reviewed [As Noted in HPI] : as noted in HPI [Negative] : Heme/Lymph [FreeTextEntry9] : h/o fibromyalgia [FreeTextEntry3] : some blurriness

## 2019-04-25 NOTE — PHYSICAL EXAM
[] : [place]-Hallpike test is negative [Normal] : no rashes [de-identified] : but felt as if he would get nauseated if continued [de-identified] : all 4, but felt somewhat dizzy and nauseated to R

## 2019-04-25 NOTE — HISTORY OF PRESENT ILLNESS
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 Had worsening with straining over the weekend for a short while one day. Can also be induced by eye motion. Tried scopolamine patch with relief. May have some hl but no new hl or tinnitus.  [FreeTextEntry1] : pmh reviewed with Dr Costa, along with allergies and meds

## 2019-04-29 ENCOUNTER — APPOINTMENT (OUTPATIENT)
Dept: MRI IMAGING | Facility: CLINIC | Age: 64
End: 2019-04-29
Payer: COMMERCIAL

## 2019-04-29 ENCOUNTER — OUTPATIENT (OUTPATIENT)
Dept: OUTPATIENT SERVICES | Facility: HOSPITAL | Age: 64
LOS: 1 days | End: 2019-04-29

## 2019-04-29 ENCOUNTER — CLINICAL ADVICE (OUTPATIENT)
Age: 64
End: 2019-04-29

## 2019-04-29 PROCEDURE — 70553 MRI BRAIN STEM W/O & W/DYE: CPT | Mod: 26

## 2019-04-29 PROCEDURE — 72141 MRI NECK SPINE W/O DYE: CPT | Mod: 26

## 2019-05-07 LAB — IL2 SERPL-MCNC: NORMAL

## 2019-05-17 ENCOUNTER — APPOINTMENT (OUTPATIENT)
Dept: INTERNAL MEDICINE | Facility: CLINIC | Age: 64
End: 2019-05-17
Payer: COMMERCIAL

## 2019-05-17 ENCOUNTER — LABORATORY RESULT (OUTPATIENT)
Age: 64
End: 2019-05-17

## 2019-05-17 PROCEDURE — 36415 COLL VENOUS BLD VENIPUNCTURE: CPT

## 2019-05-18 LAB
25(OH)D3 SERPL-MCNC: 38.7 NG/ML
ALBUMIN SERPL ELPH-MCNC: 4.5 G/DL
ALP BLD-CCNC: 61 U/L
ALT SERPL-CCNC: 21 U/L
ANION GAP SERPL CALC-SCNC: 13 MMOL/L
AST SERPL-CCNC: 20 U/L
BASOPHILS # BLD AUTO: 0.02 K/UL
BASOPHILS NFR BLD AUTO: 0.4 %
BILIRUB SERPL-MCNC: 0.5 MG/DL
BUN SERPL-MCNC: 25 MG/DL
CALCIUM SERPL-MCNC: 9.4 MG/DL
CHLORIDE SERPL-SCNC: 109 MMOL/L
CHOLEST SERPL-MCNC: 138 MG/DL
CHOLEST/HDLC SERPL: 2.6 RATIO
CK SERPL-CCNC: 134 U/L
CO2 SERPL-SCNC: 21 MMOL/L
CREAT SERPL-MCNC: 1.39 MG/DL
EOSINOPHIL # BLD AUTO: 0.08 K/UL
EOSINOPHIL NFR BLD AUTO: 1.5 %
ESTIMATED AVERAGE GLUCOSE: 114 MG/DL
GLUCOSE SERPL-MCNC: 84 MG/DL
HBA1C MFR BLD HPLC: 5.6 %
HCT VFR BLD CALC: 44.5 %
HDLC SERPL-MCNC: 54 MG/DL
HGB BLD-MCNC: 13.6 G/DL
IMM GRANULOCYTES NFR BLD AUTO: 0.2 %
LDLC SERPL CALC-MCNC: 56 MG/DL
LYMPHOCYTES # BLD AUTO: 2.18 K/UL
LYMPHOCYTES NFR BLD AUTO: 41.7 %
MAGNESIUM SERPL-MCNC: 2.3 MG/DL
MAN DIFF?: NORMAL
MCHC RBC-ENTMCNC: 28.3 PG
MCHC RBC-ENTMCNC: 30.6 GM/DL
MCV RBC AUTO: 92.7 FL
MONOCYTES # BLD AUTO: 0.43 K/UL
MONOCYTES NFR BLD AUTO: 8.2 %
NEUTROPHILS # BLD AUTO: 2.51 K/UL
NEUTROPHILS NFR BLD AUTO: 48 %
PLATELET # BLD AUTO: 148 K/UL
POTASSIUM SERPL-SCNC: 4.9 MMOL/L
PROT SERPL-MCNC: 6.9 G/DL
PSA SERPL-MCNC: 1.28 NG/ML
RBC # BLD: 4.8 M/UL
RBC # FLD: 15.7 %
SODIUM SERPL-SCNC: 143 MMOL/L
T4 FREE SERPL-MCNC: 0.9 NG/DL
TESTOST SERPL-MCNC: 512 NG/DL
TRIGL SERPL-MCNC: 140 MG/DL
TSH SERPL-ACNC: 2.48 UIU/ML
WBC # FLD AUTO: 5.23 K/UL

## 2019-06-10 ENCOUNTER — APPOINTMENT (OUTPATIENT)
Dept: INTERNAL MEDICINE | Facility: CLINIC | Age: 64
End: 2019-06-10
Payer: COMMERCIAL

## 2019-06-10 ENCOUNTER — LABORATORY RESULT (OUTPATIENT)
Age: 64
End: 2019-06-10

## 2019-06-10 PROCEDURE — 36415 COLL VENOUS BLD VENIPUNCTURE: CPT

## 2019-06-12 LAB
ALBUMIN SERPL ELPH-MCNC: 4.5 G/DL
ALP BLD-CCNC: 56 U/L
ALT SERPL-CCNC: 17 U/L
ANION GAP SERPL CALC-SCNC: 13 MMOL/L
AST SERPL-CCNC: 21 U/L
BASOPHILS # BLD AUTO: 0.02 K/UL
BASOPHILS NFR BLD AUTO: 0.3 %
BILIRUB SERPL-MCNC: 0.3 MG/DL
BUN SERPL-MCNC: 20 MG/DL
CALCIUM SERPL-MCNC: 9.5 MG/DL
CHLORIDE SERPL-SCNC: 104 MMOL/L
CHOLEST SERPL-MCNC: 121 MG/DL
CHOLEST/HDLC SERPL: 2.9 RATIO
CO2 SERPL-SCNC: 24 MMOL/L
CREAT SERPL-MCNC: 1.47 MG/DL
EOSINOPHIL # BLD AUTO: 0.07 K/UL
EOSINOPHIL NFR BLD AUTO: 1.1 %
ESTRADIOL SERPL-MCNC: 51 PG/ML
GLUCOSE SERPL-MCNC: 82 MG/DL
HCT VFR BLD CALC: 46.6 %
HDLC SERPL-MCNC: 42 MG/DL
HGB BLD-MCNC: 14 G/DL
IMM GRANULOCYTES NFR BLD AUTO: 0.3 %
LDLC SERPL CALC-MCNC: 44 MG/DL
LYMPHOCYTES # BLD AUTO: 2.32 K/UL
LYMPHOCYTES NFR BLD AUTO: 35.8 %
MAN DIFF?: NORMAL
MCHC RBC-ENTMCNC: 28.7 PG
MCHC RBC-ENTMCNC: 30 GM/DL
MCV RBC AUTO: 95.5 FL
MONOCYTES # BLD AUTO: 0.54 K/UL
MONOCYTES NFR BLD AUTO: 8.3 %
NEUTROPHILS # BLD AUTO: 3.51 K/UL
NEUTROPHILS NFR BLD AUTO: 54.2 %
PLATELET # BLD AUTO: 167 K/UL
POTASSIUM SERPL-SCNC: 4.9 MMOL/L
PROT SERPL-MCNC: 6.7 G/DL
PSA FREE FLD-MCNC: 22 %
PSA FREE SERPL-MCNC: 0.4 NG/ML
PSA SERPL-MCNC: 1.85 NG/ML
PSA SERPL-MCNC: 1.88 NG/ML
RBC # BLD: 4.88 M/UL
RBC # FLD: 16.2 %
SODIUM SERPL-SCNC: 141 MMOL/L
T3 SERPL-MCNC: 104 NG/DL
T3FREE SERPL-MCNC: 2.59 PG/ML
T4 FREE SERPL-MCNC: 0.7 NG/DL
TESTOST SERPL-MCNC: 1500 NG/DL
THYROGLOB AB SERPL-ACNC: <20 IU/ML
THYROPEROXIDASE AB SERPL IA-ACNC: <10 IU/ML
TRIGL SERPL-MCNC: 176 MG/DL
TSH SERPL-ACNC: 1.12 UIU/ML
WBC # FLD AUTO: 6.48 K/UL

## 2019-06-14 LAB
TESTOST BND SERPL-MCNC: 26.1 PG/ML
TESTOST SERPL-MCNC: 2286.8 NG/DL

## 2019-06-17 LAB — T3REVERSE SERPL-MCNC: 13.6 NG/DL

## 2019-08-02 LAB
25(OH)D3 SERPL-MCNC: 43.4 NG/ML
ALBUMIN SERPL ELPH-MCNC: 4.3 G/DL
ALP BLD-CCNC: 59 U/L
ALT SERPL-CCNC: 19 U/L
ANION GAP SERPL CALC-SCNC: 12 MMOL/L
AST SERPL-CCNC: 20 U/L
BASOPHILS # BLD AUTO: 0.03 K/UL
BASOPHILS NFR BLD AUTO: 0.6 %
BILIRUB SERPL-MCNC: 0.3 MG/DL
BUN SERPL-MCNC: 22 MG/DL
CALCIUM SERPL-MCNC: 9.2 MG/DL
CHLORIDE SERPL-SCNC: 107 MMOL/L
CHOLEST SERPL-MCNC: 108 MG/DL
CHOLEST/HDLC SERPL: 2.8 RATIO
CO2 SERPL-SCNC: 24 MMOL/L
CREAT SERPL-MCNC: 1.52 MG/DL
EOSINOPHIL # BLD AUTO: 0.05 K/UL
EOSINOPHIL NFR BLD AUTO: 1 %
ESTRADIOL SERPL-MCNC: 30 PG/ML
GLUCOSE SERPL-MCNC: 102 MG/DL
HCT VFR BLD CALC: 47.1 %
HDLC SERPL-MCNC: 38 MG/DL
HGB BLD-MCNC: 14.8 G/DL
IMM GRANULOCYTES NFR BLD AUTO: 0.2 %
LDLC SERPL CALC-MCNC: 11 MG/DL
LYMPHOCYTES # BLD AUTO: 1.8 K/UL
LYMPHOCYTES NFR BLD AUTO: 37.7 %
MAN DIFF?: NORMAL
MCHC RBC-ENTMCNC: 28.9 PG
MCHC RBC-ENTMCNC: 31.4 GM/DL
MCV RBC AUTO: 92 FL
MONOCYTES # BLD AUTO: 0.39 K/UL
MONOCYTES NFR BLD AUTO: 8.2 %
NEUTROPHILS # BLD AUTO: 2.49 K/UL
NEUTROPHILS NFR BLD AUTO: 52.3 %
PLATELET # BLD AUTO: 142 K/UL
POTASSIUM SERPL-SCNC: 4.5 MMOL/L
PROT SERPL-MCNC: 6.4 G/DL
PSA SERPL-MCNC: 2.1 NG/ML
RBC # BLD: 5.12 M/UL
RBC # FLD: 14.3 %
SODIUM SERPL-SCNC: 143 MMOL/L
T4 FREE SERPL-MCNC: 0.9 NG/DL
TRIGL SERPL-MCNC: 293 MG/DL
TSH SERPL-ACNC: 0.87 UIU/ML
WBC # FLD AUTO: 4.77 K/UL

## 2019-08-05 LAB
TESTOST BND SERPL-MCNC: 11 PG/ML
TESTOST SERPL-MCNC: 1559.4 NG/DL

## 2019-08-19 LAB
CYSTATIN C SERPL-MCNC: 0.96 MG/L
GFR/BSA.PRED SERPLBLD CYS-BASED-ARV: 81 ML/MIN

## 2019-08-30 ENCOUNTER — MEDICATION RENEWAL (OUTPATIENT)
Age: 64
End: 2019-08-30

## 2019-10-07 ENCOUNTER — APPOINTMENT (OUTPATIENT)
Dept: INTERNAL MEDICINE | Facility: CLINIC | Age: 64
End: 2019-10-07
Payer: COMMERCIAL

## 2019-10-07 DIAGNOSIS — Z23 ENCOUNTER FOR IMMUNIZATION: ICD-10-CM

## 2019-10-07 PROCEDURE — 36415 COLL VENOUS BLD VENIPUNCTURE: CPT

## 2019-10-07 PROCEDURE — G0008: CPT

## 2019-10-07 PROCEDURE — 90662 IIV NO PRSV INCREASED AG IM: CPT

## 2019-10-07 PROCEDURE — 96372 THER/PROPH/DIAG INJ SC/IM: CPT

## 2019-10-07 RX ORDER — CYANOCOBALAMIN 1000 UG/ML
1000 INJECTION INTRAMUSCULAR; SUBCUTANEOUS
Qty: 0 | Refills: 0 | Status: COMPLETED | OUTPATIENT
Start: 2019-10-07

## 2019-10-07 RX ADMIN — CYANOCOBALAMIN 0 MCG/ML: 1000 INJECTION, SOLUTION INTRAMUSCULAR at 00:00

## 2019-10-09 LAB
25(OH)D3 SERPL-MCNC: 51.2 NG/ML
ALBUMIN SERPL ELPH-MCNC: 4.5 G/DL
ALP BLD-CCNC: 64 U/L
ALT SERPL-CCNC: 18 U/L
ANION GAP SERPL CALC-SCNC: 12 MMOL/L
AST SERPL-CCNC: 25 U/L
BASOPHILS # BLD AUTO: 0.02 K/UL
BASOPHILS NFR BLD AUTO: 0.4 %
BILIRUB SERPL-MCNC: 0.3 MG/DL
BUN SERPL-MCNC: 21 MG/DL
CALCIUM SERPL-MCNC: 9.5 MG/DL
CHLORIDE SERPL-SCNC: 108 MMOL/L
CHOLEST SERPL-MCNC: 100 MG/DL
CHOLEST/HDLC SERPL: 2.3 RATIO
CO2 SERPL-SCNC: 22 MMOL/L
CREAT SERPL-MCNC: 1.28 MG/DL
CYSTATIN C SERPL-MCNC: 0.98 MG/L
EOSINOPHIL # BLD AUTO: 0.09 K/UL
EOSINOPHIL NFR BLD AUTO: 1.9 %
ESTIMATED AVERAGE GLUCOSE: 120 MG/DL
ESTRADIOL SERPL-MCNC: 25 PG/ML
GFR/BSA.PRED SERPLBLD CYS-BASED-ARV: 78 ML/MIN
GLUCOSE SERPL-MCNC: 95 MG/DL
HBA1C MFR BLD HPLC: 5.8 %
HCT VFR BLD CALC: 47.1 %
HDLC SERPL-MCNC: 43 MG/DL
HGB BLD-MCNC: 14.4 G/DL
IMM GRANULOCYTES NFR BLD AUTO: 0.2 %
LDLC SERPL CALC-MCNC: 21 MG/DL
LYMPHOCYTES # BLD AUTO: 1.91 K/UL
LYMPHOCYTES NFR BLD AUTO: 40 %
MAN DIFF?: NORMAL
MCHC RBC-ENTMCNC: 28.1 PG
MCHC RBC-ENTMCNC: 30.6 GM/DL
MCV RBC AUTO: 91.8 FL
MONOCYTES # BLD AUTO: 0.4 K/UL
MONOCYTES NFR BLD AUTO: 8.4 %
NEUTROPHILS # BLD AUTO: 2.35 K/UL
NEUTROPHILS NFR BLD AUTO: 49.1 %
PLATELET # BLD AUTO: 144 K/UL
POTASSIUM SERPL-SCNC: 5.1 MMOL/L
PROT SERPL-MCNC: 6.4 G/DL
PSA SERPL-MCNC: 1.64 NG/ML
RBC # BLD: 5.13 M/UL
RBC # FLD: 14.2 %
SODIUM SERPL-SCNC: 142 MMOL/L
TRIGL SERPL-MCNC: 180 MG/DL
WBC # FLD AUTO: 4.78 K/UL

## 2019-10-11 LAB — MERCURY BLD-MCNC: 18.2 UG/L

## 2019-10-14 LAB
TESTOST BND SERPL-MCNC: 4.7 PG/ML
TESTOST SERPL-MCNC: 982.7 NG/DL

## 2019-10-23 ENCOUNTER — MEDICATION RENEWAL (OUTPATIENT)
Age: 64
End: 2019-10-23

## 2019-11-04 ENCOUNTER — LABORATORY RESULT (OUTPATIENT)
Age: 64
End: 2019-11-04

## 2019-11-04 ENCOUNTER — APPOINTMENT (OUTPATIENT)
Dept: INTERNAL MEDICINE | Facility: CLINIC | Age: 64
End: 2019-11-04
Payer: COMMERCIAL

## 2019-11-04 DIAGNOSIS — K21.9 GASTRO-ESOPHAGEAL REFLUX DISEASE W/OUT ESOPHAGITIS: ICD-10-CM

## 2019-11-04 PROCEDURE — 36415 COLL VENOUS BLD VENIPUNCTURE: CPT

## 2019-11-04 PROCEDURE — 83014 H PYLORI DRUG ADMIN: CPT

## 2019-11-05 LAB — UREA BREATH TEST QL: NEGATIVE

## 2019-11-06 LAB
H PYLORI AB SER-ACNC: <5 UNITS
H PYLORI IGA SER-ACNC: 15.2 UNITS

## 2019-12-10 ENCOUNTER — LABORATORY RESULT (OUTPATIENT)
Age: 64
End: 2019-12-10

## 2019-12-10 ENCOUNTER — APPOINTMENT (OUTPATIENT)
Dept: INTERNAL MEDICINE | Facility: CLINIC | Age: 64
End: 2019-12-10
Payer: COMMERCIAL

## 2019-12-10 DIAGNOSIS — G44.011 EPISODIC CLUSTER HEADACHE, INTRACTABLE: ICD-10-CM

## 2019-12-10 DIAGNOSIS — R51 HEADACHE: ICD-10-CM

## 2019-12-10 PROCEDURE — 36415 COLL VENOUS BLD VENIPUNCTURE: CPT

## 2019-12-16 ENCOUNTER — RX RENEWAL (OUTPATIENT)
Age: 64
End: 2019-12-16

## 2019-12-16 LAB
A ALTERNATA IGE QN: <0.1 KUA/L
A FUMIGATUS IGE QN: <0.1 KUA/L
A NIGER IGE QN: <0.1 KUA/L
ALBUMIN SERPL ELPH-MCNC: 4.4 G/DL
ALP BLD-CCNC: 67 U/L
ALT SERPL-CCNC: 21 U/L
ANION GAP SERPL CALC-SCNC: 13 MMOL/L
ARSENIC, BLOOD: 15 UG/L
AST SERPL-CCNC: 25 U/L
BASOPHILS # BLD AUTO: 0.02 K/UL
BASOPHILS NFR BLD AUTO: 0.3 %
BILIRUB SERPL-MCNC: 0.2 MG/DL
BUN SERPL-MCNC: 18 MG/DL
C HERBARUM IGE QN: <0.1 KUA/L
CALCIUM SERPL-MCNC: 9.2 MG/DL
CHLORIDE SERPL-SCNC: 108 MMOL/L
CHOLEST SERPL-MCNC: 111 MG/DL
CHOLEST/HDLC SERPL: 2.4 RATIO
CO2 SERPL-SCNC: 21 MMOL/L
CREAT SERPL-MCNC: 1.44 MG/DL
DEPRECATED A ALTERNATA IGE RAST QL: 0
DEPRECATED A FUMIGATUS IGE RAST QL: 0
DEPRECATED A NIGER IGE RAST QL: 0
DEPRECATED C HERBARUM IGE RAST QL: 0
DEPRECATED P NOTATUM IGE RAST QL: 0
DEPRECATED S ROSTRATA IGE RAST QL: 0
EOSINOPHIL # BLD AUTO: 0.22 K/UL
EOSINOPHIL NFR BLD AUTO: 3.6 %
GLUCOSE SERPL-MCNC: 98 MG/DL
HCT VFR BLD CALC: 43.7 %
HDLC SERPL-MCNC: 47 MG/DL
HGB BLD-MCNC: 13.6 G/DL
IMM GRANULOCYTES NFR BLD AUTO: 0.2 %
LDLC SERPL CALC-MCNC: 29 MG/DL
LYMPHOCYTES # BLD AUTO: 2.52 K/UL
LYMPHOCYTES NFR BLD AUTO: 41.4 %
MAN DIFF?: NORMAL
MCHC RBC-ENTMCNC: 28 PG
MCHC RBC-ENTMCNC: 31.1 GM/DL
MCV RBC AUTO: 89.9 FL
MERCURY BLD-MCNC: 12.4 UG/L
MISCELLANEOUS TEST: NORMAL
MONOCYTES # BLD AUTO: 0.45 K/UL
MONOCYTES NFR BLD AUTO: 7.4 %
NEUTROPHILS # BLD AUTO: 2.87 K/UL
NEUTROPHILS NFR BLD AUTO: 47.1 %
P NOTATUM IGE QN: <0.1 KUA/L
PLATELET # BLD AUTO: 154 K/UL
POTASSIUM SERPL-SCNC: 4.4 MMOL/L
PROC NAME: NORMAL
PROT SERPL-MCNC: 6.3 G/DL
PSA SERPL-MCNC: 1.21 NG/ML
RBC # BLD: 4.86 M/UL
RBC # FLD: 14.7 %
S ROSTRATA IGE QN: <0.1 KUA/L
SODIUM SERPL-SCNC: 142 MMOL/L
TRIGL SERPL-MCNC: 176 MG/DL
WBC # FLD AUTO: 6.09 K/UL

## 2019-12-19 LAB
TESTOST BND SERPL-MCNC: 3.2 PG/ML
TESTOST SERPL-MCNC: 794.2 NG/DL

## 2019-12-26 LAB
ESTRADIOL SERPL-MCNC: 22 PG/ML
PROGEST SERPL-MCNC: 0.2 NG/ML
PROLACTIN SERPL-MCNC: 10.6 NG/ML
SHBG SERPL-SCNC: 94 NMOL/L

## 2019-12-30 LAB — SHBG-ESOTERIX: 112.9 NMOL/L

## 2020-04-07 ENCOUNTER — RX RENEWAL (OUTPATIENT)
Age: 65
End: 2020-04-07

## 2020-04-20 ENCOUNTER — APPOINTMENT (OUTPATIENT)
Dept: INTERNAL MEDICINE | Facility: CLINIC | Age: 65
End: 2020-04-20
Payer: COMMERCIAL

## 2020-04-20 ENCOUNTER — NON-APPOINTMENT (OUTPATIENT)
Age: 65
End: 2020-04-20

## 2020-04-20 PROCEDURE — 99214 OFFICE O/P EST MOD 30 MIN: CPT | Mod: 25

## 2020-04-20 PROCEDURE — 36415 COLL VENOUS BLD VENIPUNCTURE: CPT

## 2020-04-20 PROCEDURE — 93000 ELECTROCARDIOGRAM COMPLETE: CPT

## 2020-04-20 NOTE — HISTORY OF PRESENT ILLNESS
[FreeTextEntry1] : follow up [de-identified] : Palpitations\par - likely related to stress\par - not exercise induced\par - worse when he is at rest\par - started Toprol which helped with palpitations and migraines. \par \par elevated PSA, Low T\par - needs levels checked

## 2020-04-21 LAB
ALBUMIN SERPL ELPH-MCNC: 4.5 G/DL
ALP BLD-CCNC: 73 U/L
ALT SERPL-CCNC: 26 U/L
ANION GAP SERPL CALC-SCNC: 13 MMOL/L
AST SERPL-CCNC: 25 U/L
BASOPHILS # BLD AUTO: 0.01 K/UL
BASOPHILS NFR BLD AUTO: 0.1 %
BILIRUB SERPL-MCNC: 0.4 MG/DL
BUN SERPL-MCNC: 19 MG/DL
CALCIUM SERPL-MCNC: 9.6 MG/DL
CHLORIDE SERPL-SCNC: 105 MMOL/L
CHOLEST SERPL-MCNC: 120 MG/DL
CHOLEST/HDLC SERPL: 2.3 RATIO
CO2 SERPL-SCNC: 26 MMOL/L
CREAT SERPL-MCNC: 1.29 MG/DL
EOSINOPHIL # BLD AUTO: 0.05 K/UL
EOSINOPHIL NFR BLD AUTO: 0.7 %
ESTRADIOL SERPL-MCNC: 36 PG/ML
GLUCOSE SERPL-MCNC: 94 MG/DL
HCT VFR BLD CALC: 53.3 %
HDLC SERPL-MCNC: 53 MG/DL
HGB BLD-MCNC: 16.1 G/DL
IMM GRANULOCYTES NFR BLD AUTO: 0.3 %
LDLC SERPL CALC-MCNC: 40 MG/DL
LYMPHOCYTES # BLD AUTO: 2.67 K/UL
LYMPHOCYTES NFR BLD AUTO: 40 %
MAGNESIUM SERPL-MCNC: 2.2 MG/DL
MAN DIFF?: NORMAL
MCHC RBC-ENTMCNC: 28.1 PG
MCHC RBC-ENTMCNC: 30.2 GM/DL
MCV RBC AUTO: 93.2 FL
MONOCYTES # BLD AUTO: 0.55 K/UL
MONOCYTES NFR BLD AUTO: 8.2 %
NEUTROPHILS # BLD AUTO: 3.38 K/UL
NEUTROPHILS NFR BLD AUTO: 50.7 %
PLATELET # BLD AUTO: 136 K/UL
POTASSIUM SERPL-SCNC: 4.6 MMOL/L
PROT SERPL-MCNC: 6.8 G/DL
PSA SERPL-MCNC: 2.07 NG/ML
RBC # BLD: 5.72 M/UL
RBC # FLD: 14.9 %
SODIUM SERPL-SCNC: 144 MMOL/L
T4 FREE SERPL-MCNC: 0.9 NG/DL
TRIGL SERPL-MCNC: 133 MG/DL
TSH SERPL-ACNC: 1.54 UIU/ML
WBC # FLD AUTO: 6.68 K/UL

## 2020-04-23 LAB
TESTOST BND SERPL-MCNC: 11.2 PG/ML
TESTOST SERPL-MCNC: 1654.1 NG/DL

## 2020-04-26 ENCOUNTER — MESSAGE (OUTPATIENT)
Age: 65
End: 2020-04-26

## 2020-04-30 ENCOUNTER — OUTPATIENT (OUTPATIENT)
Dept: OUTPATIENT SERVICES | Facility: HOSPITAL | Age: 65
LOS: 1 days | End: 2020-04-30
Payer: COMMERCIAL

## 2020-04-30 ENCOUNTER — RESULT REVIEW (OUTPATIENT)
Age: 65
End: 2020-04-30

## 2020-04-30 ENCOUNTER — LABORATORY RESULT (OUTPATIENT)
Age: 65
End: 2020-04-30

## 2020-04-30 ENCOUNTER — APPOINTMENT (OUTPATIENT)
Dept: INTERNAL MEDICINE | Facility: CLINIC | Age: 65
End: 2020-04-30
Payer: COMMERCIAL

## 2020-04-30 PROCEDURE — A9552: CPT

## 2020-04-30 PROCEDURE — 36415 COLL VENOUS BLD VENIPUNCTURE: CPT

## 2020-04-30 PROCEDURE — 82962 GLUCOSE BLOOD TEST: CPT

## 2020-04-30 PROCEDURE — 78815 PET IMAGE W/CT SKULL-THIGH: CPT

## 2020-04-30 PROCEDURE — 78815 PET IMAGE W/CT SKULL-THIGH: CPT | Mod: 26

## 2020-04-30 PROCEDURE — 99213 OFFICE O/P EST LOW 20 MIN: CPT | Mod: 25

## 2020-04-30 RX ORDER — RIBAVIRIN 200 MG/1
200 TABLET, FILM COATED ORAL TWICE DAILY
Qty: 60 | Refills: 6 | Status: COMPLETED | COMMUNITY
Start: 2020-03-16 | End: 2020-04-30

## 2020-04-30 RX ORDER — TOBRAMYCIN AND DEXAMETHASONE 3; 1 MG/ML; MG/ML
0.3-0.1 SUSPENSION/ DROPS OPHTHALMIC
Qty: 1 | Refills: 0 | Status: COMPLETED | COMMUNITY
Start: 2018-11-24 | End: 2020-04-30

## 2020-04-30 NOTE — HISTORY OF PRESENT ILLNESS
[FreeTextEntry1] : follow up [de-identified] : ? aortic large cell arteritis\par - underwent PET/CT today; results pending\par - will need quanti gold, sed rate and CRP as per rheumatology\par

## 2020-05-01 LAB
CRP SERPL-MCNC: <0.1 MG/DL
ERYTHROCYTE [SEDIMENTATION RATE] IN BLOOD BY WESTERGREN METHOD: 14 MM/HR

## 2020-05-02 LAB
M TB IFN-G BLD-IMP: NEGATIVE
QUANTIFERON TB PLUS MITOGEN MINUS NIL: 5.97 IU/ML
QUANTIFERON TB PLUS NIL: 0.04 IU/ML
QUANTIFERON TB PLUS TB1 MINUS NIL: -0.02 IU/ML
QUANTIFERON TB PLUS TB2 MINUS NIL: -0.03 IU/ML

## 2020-05-06 ENCOUNTER — LABORATORY RESULT (OUTPATIENT)
Age: 65
End: 2020-05-06

## 2020-05-06 ENCOUNTER — APPOINTMENT (OUTPATIENT)
Dept: INTERNAL MEDICINE | Facility: CLINIC | Age: 65
End: 2020-05-06
Payer: COMMERCIAL

## 2020-05-06 PROCEDURE — 36415 COLL VENOUS BLD VENIPUNCTURE: CPT

## 2020-05-06 RX ORDER — RITONAVIR 100 MG 100 MG/1
100 TABLET ORAL
Qty: 30 | Refills: 0 | Status: COMPLETED | COMMUNITY
Start: 2020-03-13

## 2020-05-06 RX ORDER — AZITHROMYCIN 500 MG/1
500 TABLET, FILM COATED ORAL
Qty: 30 | Refills: 0 | Status: COMPLETED | COMMUNITY
Start: 2020-03-22

## 2020-05-13 DIAGNOSIS — Z29.8 ENCOUNTER FOR OTHER SPECIFIED PROPHYLACTIC MEASURES: ICD-10-CM

## 2020-05-13 DIAGNOSIS — Z79.52 LONG TERM (CURRENT) USE OF SYSTEMIC STEROIDS: ICD-10-CM

## 2020-05-20 ENCOUNTER — LABORATORY RESULT (OUTPATIENT)
Age: 65
End: 2020-05-20

## 2020-05-20 ENCOUNTER — APPOINTMENT (OUTPATIENT)
Dept: INTERNAL MEDICINE | Facility: CLINIC | Age: 65
End: 2020-05-20
Payer: COMMERCIAL

## 2020-05-20 DIAGNOSIS — Z11.59 ENCOUNTER FOR SCREENING FOR OTHER VIRAL DISEASES: ICD-10-CM

## 2020-05-20 PROCEDURE — 36415 COLL VENOUS BLD VENIPUNCTURE: CPT

## 2020-05-21 LAB
ALBUMIN SERPL ELPH-MCNC: 4.8 G/DL
ALP BLD-CCNC: 75 U/L
ALT SERPL-CCNC: 39 U/L
ANION GAP SERPL CALC-SCNC: 12 MMOL/L
AST SERPL-CCNC: 30 U/L
BASOPHILS # BLD AUTO: 0.01 K/UL
BASOPHILS NFR BLD AUTO: 0.2 %
BILIRUB SERPL-MCNC: 0.7 MG/DL
BUN SERPL-MCNC: 22 MG/DL
CALCIUM SERPL-MCNC: 9.4 MG/DL
CHLORIDE SERPL-SCNC: 103 MMOL/L
CHOLEST SERPL-MCNC: 155 MG/DL
CHOLEST/HDLC SERPL: 1.9 RATIO
CO2 SERPL-SCNC: 25 MMOL/L
CREAT SERPL-MCNC: 1.18 MG/DL
EOSINOPHIL # BLD AUTO: 0.01 K/UL
EOSINOPHIL NFR BLD AUTO: 0.2 %
GLUCOSE SERPL-MCNC: 109 MG/DL
HCT VFR BLD CALC: 54.4 %
HDLC SERPL-MCNC: 83 MG/DL
HGB BLD-MCNC: 16.8 G/DL
IMM GRANULOCYTES NFR BLD AUTO: 0.2 %
LDLC SERPL CALC-MCNC: 50 MG/DL
LYMPHOCYTES # BLD AUTO: 1.17 K/UL
LYMPHOCYTES NFR BLD AUTO: 28.5 %
MAN DIFF?: NORMAL
MCHC RBC-ENTMCNC: 28 PG
MCHC RBC-ENTMCNC: 30.9 GM/DL
MCV RBC AUTO: 90.5 FL
MONOCYTES # BLD AUTO: 0.2 K/UL
MONOCYTES NFR BLD AUTO: 4.9 %
NEUTROPHILS # BLD AUTO: 2.71 K/UL
NEUTROPHILS NFR BLD AUTO: 66 %
PLATELET # BLD AUTO: 122 K/UL
POTASSIUM SERPL-SCNC: 4.9 MMOL/L
PROT SERPL-MCNC: 6.6 G/DL
RBC # BLD: 6.01 M/UL
RBC # FLD: 17.2 %
SARS-COV-2 IGG SERPL IA-ACNC: <0.1 INDEX
SARS-COV-2 IGG SERPL QL IA: NEGATIVE
SODIUM SERPL-SCNC: 140 MMOL/L
TRIGL SERPL-MCNC: 110 MG/DL
WBC # FLD AUTO: 4.11 K/UL

## 2020-05-27 LAB
SARS-COV-2 IGG SERPL IA-ACNC: 0.2 RATIO
SARS-COV-2 IGG SERPL QL IA: NEGATIVE

## 2020-05-28 LAB
PSA SERPL-MCNC: 1.8 NG/ML
TESTOST SERPL-MCNC: >1500 NG/DL

## 2020-06-01 LAB — APO LP(A) SERPL-MCNC: 338.1 NMOL/L

## 2020-07-01 ENCOUNTER — APPOINTMENT (OUTPATIENT)
Dept: CT IMAGING | Facility: HOSPITAL | Age: 65
End: 2020-07-01
Payer: COMMERCIAL

## 2020-07-01 ENCOUNTER — OUTPATIENT (OUTPATIENT)
Dept: OUTPATIENT SERVICES | Facility: HOSPITAL | Age: 65
LOS: 1 days | End: 2020-07-01
Payer: COMMERCIAL

## 2020-07-01 PROCEDURE — 75574 CT ANGIO HRT W/3D IMAGE: CPT

## 2020-07-01 PROCEDURE — 75574 CT ANGIO HRT W/3D IMAGE: CPT | Mod: 26

## 2020-07-05 ENCOUNTER — RESULT REVIEW (OUTPATIENT)
Age: 65
End: 2020-07-05

## 2020-07-06 ENCOUNTER — OUTPATIENT (OUTPATIENT)
Dept: OUTPATIENT SERVICES | Facility: HOSPITAL | Age: 65
LOS: 1 days | End: 2020-07-06
Payer: COMMERCIAL

## 2020-07-06 PROCEDURE — 0502T: CPT

## 2020-07-06 PROCEDURE — 0504T: CPT

## 2020-07-06 PROCEDURE — 0503T: CPT

## 2020-07-14 ENCOUNTER — APPOINTMENT (OUTPATIENT)
Dept: INTERNAL MEDICINE | Facility: CLINIC | Age: 65
End: 2020-07-14
Payer: COMMERCIAL

## 2020-07-14 DIAGNOSIS — R97.20 ELEVATED PROSTATE, SPECIFIC ANTIGEN [PSA]: ICD-10-CM

## 2020-07-14 PROCEDURE — 36415 COLL VENOUS BLD VENIPUNCTURE: CPT

## 2020-07-17 LAB
25(OH)D3 SERPL-MCNC: 185 NG/ML
ALBUMIN SERPL ELPH-MCNC: 4.6 G/DL
ALP BLD-CCNC: 46 U/L
ALT SERPL-CCNC: 32 U/L
ANION GAP SERPL CALC-SCNC: 15 MMOL/L
APO LP(A) SERPL-MCNC: 191.3 NMOL/L
AST SERPL-CCNC: 26 U/L
BASOPHILS # BLD AUTO: 0.01 K/UL
BASOPHILS NFR BLD AUTO: 0.2 %
BILIRUB SERPL-MCNC: 0.6 MG/DL
BUN SERPL-MCNC: 24 MG/DL
CALCIUM SERPL-MCNC: 9.3 MG/DL
CHLORIDE SERPL-SCNC: 101 MMOL/L
CHOLEST SERPL-MCNC: 182 MG/DL
CHOLEST/HDLC SERPL: 1.8 RATIO
CO2 SERPL-SCNC: 22 MMOL/L
CREAT SERPL-MCNC: 1.4 MG/DL
CRP SERPL-MCNC: <0.1 MG/DL
EOSINOPHIL # BLD AUTO: 0 K/UL
EOSINOPHIL NFR BLD AUTO: 0 %
ESTRADIOL SERPL-MCNC: 16 PG/ML
GLUCOSE SERPL-MCNC: 107 MG/DL
HCT VFR BLD CALC: 46.6 %
HDLC SERPL-MCNC: 103 MG/DL
HGB BLD-MCNC: 14.4 G/DL
IMM GRANULOCYTES NFR BLD AUTO: 0.6 %
LDLC SERPL CALC-MCNC: 54 MG/DL
LYMPHOCYTES # BLD AUTO: 0.6 K/UL
LYMPHOCYTES NFR BLD AUTO: 11.7 %
MAN DIFF?: NORMAL
MCHC RBC-ENTMCNC: 28.4 PG
MCHC RBC-ENTMCNC: 30.9 GM/DL
MCV RBC AUTO: 91.9 FL
MONOCYTES # BLD AUTO: 0.31 K/UL
MONOCYTES NFR BLD AUTO: 6.1 %
NEUTROPHILS # BLD AUTO: 4.16 K/UL
NEUTROPHILS NFR BLD AUTO: 81.4 %
PLATELET # BLD AUTO: 116 K/UL
POTASSIUM SERPL-SCNC: 4.9 MMOL/L
PROT SERPL-MCNC: 6 G/DL
PSA SERPL-MCNC: 1.18 NG/ML
RBC # BLD: 5.07 M/UL
RBC # FLD: 18.2 %
SODIUM SERPL-SCNC: 138 MMOL/L
TESTOST SERPL-MCNC: 634 NG/DL
TRIGL SERPL-MCNC: 127 MG/DL
TSH SERPL-ACNC: 1 UIU/ML
WBC # FLD AUTO: 5.11 K/UL

## 2020-07-30 ENCOUNTER — RX RENEWAL (OUTPATIENT)
Age: 65
End: 2020-07-30

## 2020-08-24 ENCOUNTER — LABORATORY RESULT (OUTPATIENT)
Age: 65
End: 2020-08-24

## 2020-08-26 LAB
APO LP(A) SERPL-MCNC: 178.3 NMOL/L
T3 SERPL-MCNC: 99 NG/DL

## 2020-09-03 DIAGNOSIS — G47.419 NARCOLEPSY W/OUT CATAPLEXY: ICD-10-CM

## 2020-10-02 ENCOUNTER — APPOINTMENT (OUTPATIENT)
Dept: INTERNAL MEDICINE | Facility: CLINIC | Age: 65
End: 2020-10-02
Payer: COMMERCIAL

## 2020-10-02 DIAGNOSIS — Z23 ENCOUNTER FOR IMMUNIZATION: ICD-10-CM

## 2020-10-02 PROCEDURE — G0008: CPT

## 2020-10-02 PROCEDURE — 36415 COLL VENOUS BLD VENIPUNCTURE: CPT

## 2020-10-02 PROCEDURE — 90662 IIV NO PRSV INCREASED AG IM: CPT

## 2020-10-05 LAB
25(OH)D3 SERPL-MCNC: 69.4 NG/ML
ALBUMIN SERPL ELPH-MCNC: 4.5 G/DL
ALP BLD-CCNC: 44 U/L
ALT SERPL-CCNC: 35 U/L
ANION GAP SERPL CALC-SCNC: 12 MMOL/L
APO LP(A) SERPL-MCNC: 164.7 NMOL/L
AST SERPL-CCNC: 29 U/L
BASOPHILS # BLD AUTO: 0.01 K/UL
BASOPHILS NFR BLD AUTO: 0.2 %
BILIRUB SERPL-MCNC: 0.5 MG/DL
BUN SERPL-MCNC: 20 MG/DL
CALCIUM SERPL-MCNC: 9.5 MG/DL
CHLORIDE SERPL-SCNC: 106 MMOL/L
CHOLEST SERPL-MCNC: 142 MG/DL
CHOLEST/HDLC SERPL: 2.5 RATIO
CO2 SERPL-SCNC: 24 MMOL/L
CREAT SERPL-MCNC: 1.56 MG/DL
CRP SERPL-MCNC: <0.1 MG/DL
EOSINOPHIL # BLD AUTO: 0.04 K/UL
EOSINOPHIL NFR BLD AUTO: 0.8 %
ESTRADIOL SERPL-MCNC: 34 PG/ML
GLUCOSE SERPL-MCNC: 95 MG/DL
HCT VFR BLD CALC: 48.9 %
HDLC SERPL-MCNC: 57 MG/DL
HGB BLD-MCNC: 15.1 G/DL
IMM GRANULOCYTES NFR BLD AUTO: 0.2 %
LDLC SERPL CALC-MCNC: 50 MG/DL
LYMPHOCYTES # BLD AUTO: 1.88 K/UL
LYMPHOCYTES NFR BLD AUTO: 39.8 %
MAN DIFF?: NORMAL
MCHC RBC-ENTMCNC: 29.4 PG
MCHC RBC-ENTMCNC: 30.9 GM/DL
MCV RBC AUTO: 95.1 FL
MONOCYTES # BLD AUTO: 0.44 K/UL
MONOCYTES NFR BLD AUTO: 9.3 %
NEUTROPHILS # BLD AUTO: 2.34 K/UL
NEUTROPHILS NFR BLD AUTO: 49.7 %
PLATELET # BLD AUTO: 110 K/UL
POTASSIUM SERPL-SCNC: 4.4 MMOL/L
PROT SERPL-MCNC: 6.1 G/DL
RBC # BLD: 5.14 M/UL
RBC # FLD: 14.2 %
SODIUM SERPL-SCNC: 142 MMOL/L
TRIGL SERPL-MCNC: 179 MG/DL
WBC # FLD AUTO: 4.72 K/UL

## 2020-10-13 ENCOUNTER — APPOINTMENT (OUTPATIENT)
Dept: OTOLARYNGOLOGY | Facility: CLINIC | Age: 65
End: 2020-10-13
Payer: COMMERCIAL

## 2020-10-13 DIAGNOSIS — R04.0 EPISTAXIS: ICD-10-CM

## 2020-10-13 PROCEDURE — 99214 OFFICE O/P EST MOD 30 MIN: CPT | Mod: 25

## 2020-10-13 PROCEDURE — 31231 NASAL ENDOSCOPY DX: CPT

## 2020-10-13 NOTE — PHYSICAL EXAM
[Normal] : no rashes [de-identified] : Acute spontaneous epistaxis Rt side, no hx of trauma.\par scoped and packed

## 2020-10-13 NOTE — PROCEDURE
[Image(s) Captured] : image(s) captured and filed [Epistaxis] : evaluation of epistaxis [Topical Lidocaine] : topical lidocaine [Rigid Endoscope] : examined with a rigid endoscope [Serial Number: ___] : Serial Number: [unfilled] [Nasal Septum Mucosa Bleeding] : no bleeding [Nasal Septum Mucosa Bleeding Right] : bleeding on the right [Cauterized w/Silver Nitrate] : the bleeding was not cauterized with silver nitrate [FreeTextEntry6] : Acute spontaneous epistaxis right side \par

## 2020-10-13 NOTE — REASON FOR VISIT
[Subsequent Evaluation] : a subsequent evaluation for [FreeTextEntry2] : Acute spontaneous epistaxis on the right side for the past couple of days.

## 2020-10-13 NOTE — HISTORY OF PRESENT ILLNESS
[de-identified] : 65 years old male patient with history of Acute spontaneous epistaxis on the right side for the past couple of days.  Patient is present today in the office with recurrent  Acute spontaneous epistaxis of the right side.  No history of trauma.\par Bacitracin ointment was inserted into patient right nostril for lubrication

## 2020-10-15 NOTE — PHYSICAL EXAM
DISPLAY PLAN FREE TEXT [Normal] : no rashes [de-identified] : Acute spontaneous epistaxis Rt side, no hx of trauma.\par scoped and packed DISPLAY PLAN FREE TEXT DISPLAY PLAN FREE TEXT DISPLAY PLAN FREE TEXT DISPLAY PLAN FREE TEXT DISPLAY PLAN FREE TEXT DISPLAY PLAN FREE TEXT DISPLAY PLAN FREE TEXT DISPLAY PLAN FREE TEXT DISPLAY PLAN FREE TEXT DISPLAY PLAN FREE TEXT DISPLAY PLAN FREE TEXT DISPLAY PLAN FREE TEXT DISPLAY PLAN FREE TEXT DISPLAY PLAN FREE TEXT DISPLAY PLAN FREE TEXT DISPLAY PLAN FREE TEXT DISPLAY PLAN FREE TEXT DISPLAY PLAN FREE TEXT DISPLAY PLAN FREE TEXT DISPLAY PLAN FREE TEXT DISPLAY PLAN FREE TEXT DISPLAY PLAN FREE TEXT DISPLAY PLAN FREE TEXT DISPLAY PLAN FREE TEXT DISPLAY PLAN FREE TEXT DISPLAY PLAN FREE TEXT DISPLAY PLAN FREE TEXT DISPLAY PLAN FREE TEXT

## 2020-10-15 NOTE — HISTORY OF PRESENT ILLNESS
[de-identified] : 65 years old male patient with history of Acute spontaneous epistaxis on the right side for the past couple of days.  Patient is present today in the office with recurrent  Acute spontaneous epistaxis of the right side.  No history of trauma.\par Bacitracin ointment was inserted into patient right nostril for lubrication

## 2020-10-20 ENCOUNTER — OUTPATIENT (OUTPATIENT)
Dept: OUTPATIENT SERVICES | Facility: HOSPITAL | Age: 65
LOS: 1 days | End: 2020-10-20
Payer: COMMERCIAL

## 2020-10-20 ENCOUNTER — RESULT REVIEW (OUTPATIENT)
Age: 65
End: 2020-10-20

## 2020-10-20 PROCEDURE — 71555 MRI ANGIO CHEST W OR W/O DYE: CPT

## 2020-10-20 PROCEDURE — A9585: CPT

## 2020-10-20 PROCEDURE — 71555 MRI ANGIO CHEST W OR W/O DYE: CPT | Mod: 26

## 2020-10-26 ENCOUNTER — APPOINTMENT (OUTPATIENT)
Dept: INTERNAL MEDICINE | Facility: CLINIC | Age: 65
End: 2020-10-26
Payer: COMMERCIAL

## 2020-10-26 PROCEDURE — 36415 COLL VENOUS BLD VENIPUNCTURE: CPT

## 2020-10-26 PROCEDURE — 99072 ADDL SUPL MATRL&STAF TM PHE: CPT

## 2020-10-27 ENCOUNTER — RESULT REVIEW (OUTPATIENT)
Age: 65
End: 2020-10-27

## 2020-10-27 ENCOUNTER — OUTPATIENT (OUTPATIENT)
Dept: OUTPATIENT SERVICES | Facility: HOSPITAL | Age: 65
LOS: 1 days | End: 2020-10-27
Payer: COMMERCIAL

## 2020-10-27 PROCEDURE — 76705 ECHO EXAM OF ABDOMEN: CPT

## 2020-10-27 PROCEDURE — 76775 US EXAM ABDO BACK WALL LIM: CPT

## 2020-10-27 PROCEDURE — A9562: CPT

## 2020-10-27 PROCEDURE — 93976 VASCULAR STUDY: CPT

## 2020-10-27 PROCEDURE — 76775 US EXAM ABDO BACK WALL LIM: CPT | Mod: 26,59

## 2020-10-27 PROCEDURE — 76705 ECHO EXAM OF ABDOMEN: CPT | Mod: 26,59

## 2020-10-27 PROCEDURE — 78707 K FLOW/FUNCT IMAGE W/O DRUG: CPT

## 2020-10-27 PROCEDURE — 78707 K FLOW/FUNCT IMAGE W/O DRUG: CPT | Mod: 26

## 2020-10-27 PROCEDURE — 93976 VASCULAR STUDY: CPT | Mod: 26

## 2020-10-29 ENCOUNTER — APPOINTMENT (OUTPATIENT)
Dept: OTOLARYNGOLOGY | Facility: CLINIC | Age: 65
End: 2020-10-29
Payer: COMMERCIAL

## 2020-10-29 VITALS — TEMPERATURE: 97.4 F

## 2020-10-29 LAB — TRYPTASE: 3.7 NG/ML

## 2020-10-29 PROCEDURE — 30903 CONTROL OF NOSEBLEED: CPT | Mod: RT

## 2020-10-29 PROCEDURE — 99072 ADDL SUPL MATRL&STAF TM PHE: CPT

## 2020-10-29 NOTE — PHYSICAL EXAM
[Normal] : no rashes [de-identified] : Acute spontaneous epistaxis Rt side, no hx of trauma.\par scoped and packed

## 2020-10-29 NOTE — PROCEDURE
[Image(s) Captured] : image(s) captured and filed [Epistaxis] : evaluation of epistaxis [Topical Lidocaine] : topical lidocaine [Rigid Endoscope] : examined with a rigid endoscope [Serial Number: ___] : Serial Number: [unfilled] [Nasal Septum Mucosa Bleeding] : no bleeding [Nasal Septum Mucosa Bleeding Right] : bleeding on the right [Cauterized w/Silver Nitrate] : the bleeding was cauterized with silver nitrate [FreeTextEntry1] : Laser Assisted Control of Right Side Epistaxis . ( Diode Laser )  [FreeTextEntry3] : Laser Assisted Control of Right Side Epistaxis .( Diode Laser )  [FreeTextEntry6] : Acute spontaneous epistaxis right side \par  [FreeTextEntry2] : Laser Assisted Control of Right Side Epistaxis .( Diode Laser )

## 2020-10-29 NOTE — HISTORY OF PRESENT ILLNESS
[de-identified] : 65 years old male patient with history of Acute spontaneous epistaxis on the right side for the past couple of days.  Patient is present today in the office with recurrent  Acute spontaneous epistaxis of the right side.  No history of trauma. Laser Assisted Control of Right Side Epistaxis .  Bacitracin ointment was inserted into patient right nostril for lubrication

## 2020-10-30 LAB — HISTAMINE BLD-MCNC: 0.85 NG/ML

## 2020-11-02 ENCOUNTER — NON-APPOINTMENT (OUTPATIENT)
Age: 65
End: 2020-11-02

## 2020-11-02 ENCOUNTER — RX RENEWAL (OUTPATIENT)
Age: 65
End: 2020-11-02

## 2020-11-02 ENCOUNTER — APPOINTMENT (OUTPATIENT)
Dept: INTERNAL MEDICINE | Facility: CLINIC | Age: 65
End: 2020-11-02
Payer: COMMERCIAL

## 2020-11-02 DIAGNOSIS — Z11.59 ENCOUNTER FOR SCREENING FOR OTHER VIRAL DISEASES: ICD-10-CM

## 2020-11-02 LAB — HISTAWAB: 302 NMOL/L

## 2020-11-02 PROCEDURE — 99072 ADDL SUPL MATRL&STAF TM PHE: CPT

## 2020-11-02 PROCEDURE — 99214 OFFICE O/P EST MOD 30 MIN: CPT | Mod: 25

## 2020-11-02 PROCEDURE — 93000 ELECTROCARDIOGRAM COMPLETE: CPT

## 2020-11-02 PROCEDURE — 36415 COLL VENOUS BLD VENIPUNCTURE: CPT

## 2020-11-03 LAB
ALBUMIN SERPL ELPH-MCNC: 4.3 G/DL
ALP BLD-CCNC: 42 U/L
ALT SERPL-CCNC: 37 U/L
ANION GAP SERPL CALC-SCNC: 11 MMOL/L
APTT BLD: 31.1 SEC
AST SERPL-CCNC: 37 U/L
BASOPHILS # BLD AUTO: 0.01 K/UL
BASOPHILS NFR BLD AUTO: 0.2 %
BILIRUB SERPL-MCNC: 0.5 MG/DL
BUN SERPL-MCNC: 22 MG/DL
CALCIUM SERPL-MCNC: 9.3 MG/DL
CHLORIDE SERPL-SCNC: 107 MMOL/L
CO2 SERPL-SCNC: 26 MMOL/L
CREAT 24H UR-MCNC: 1.5 G/24 H
CREAT ?TM UR-MCNC: 121 MG/DL
CREAT SERPL-MCNC: 1.67 MG/DL
CYSTATIN C SERPL-MCNC: 1.01 MG/L
EOSINOPHIL # BLD AUTO: 0.06 K/UL
EOSINOPHIL NFR BLD AUTO: 1.3 %
GFR/BSA.PRED SERPLBLD CYS-BASED-ARV: 75 ML/MIN
GLUCOSE SERPL-MCNC: 88 MG/DL
HCT VFR BLD CALC: 49.6 %
HGB BLD-MCNC: 15.4 G/DL
IMM GRANULOCYTES NFR BLD AUTO: 0 %
INR PPP: 0.97 RATIO
LYMPHOCYTES # BLD AUTO: 2.35 K/UL
LYMPHOCYTES NFR BLD AUTO: 51.2 %
MAN DIFF?: NORMAL
MCHC RBC-ENTMCNC: 29.4 PG
MCHC RBC-ENTMCNC: 31 GM/DL
MCV RBC AUTO: 94.7 FL
MONOCYTES # BLD AUTO: 0.46 K/UL
MONOCYTES NFR BLD AUTO: 10 %
NEUTROPHILS # BLD AUTO: 1.71 K/UL
NEUTROPHILS NFR BLD AUTO: 37.3 %
PLATELET # BLD AUTO: 120 K/UL
POTASSIUM SERPL-SCNC: 4.6 MMOL/L
PROT ?TM UR-MCNC: 24 HR
PROT SERPL-MCNC: 5.9 G/DL
PT BLD: 11.6 SEC
RBC # BLD: 5.24 M/UL
RBC # FLD: 13.9 %
SODIUM SERPL-SCNC: 143 MMOL/L
SPECIMEN VOL 24H UR: 1250 ML
WBC # FLD AUTO: 4.59 K/UL

## 2020-11-03 NOTE — HISTORY OF PRESENT ILLNESS
[No Pertinent Cardiac History] : no history of aortic stenosis, atrial fibrillation, coronary artery disease, recent myocardial infarction, or implantable device/pacemaker [No Pertinent Pulmonary History] : no history of asthma, COPD, sleep apnea, or smoking [No Adverse Anesthesia Reaction] : no adverse anesthesia reaction in self or family member [(Patient denies any chest pain, claudication, dyspnea on exertion, orthopnea, palpitations or syncope)] : Patient denies any chest pain, claudication, dyspnea on exertion, orthopnea, palpitations or syncope [Excellent (>10 METs)] : Excellent (>10 METs) [Chronic Anticoagulation] : no chronic anticoagulation [Chronic Kidney Disease] : chronic kidney disease [Diabetes] : no diabetes [FreeTextEntry1] : Septoplasty [FreeTextEntry2] : 11/5/2020 [FreeTextEntry3] : Dr. Ha [FreeTextEntry4] : h/o recurrent epistaxis

## 2020-11-04 ENCOUNTER — TRANSCRIPTION ENCOUNTER (OUTPATIENT)
Age: 65
End: 2020-11-04

## 2020-11-04 LAB — SARS-COV-2 N GENE NPH QL NAA+PROBE: NOT DETECTED

## 2020-11-05 ENCOUNTER — APPOINTMENT (OUTPATIENT)
Dept: OTOLARYNGOLOGY | Facility: AMBULATORY SURGERY CENTER | Age: 65
End: 2020-11-05

## 2020-11-05 ENCOUNTER — OUTPATIENT (OUTPATIENT)
Dept: OUTPATIENT SERVICES | Facility: HOSPITAL | Age: 65
LOS: 1 days | Discharge: HOME | End: 2020-11-05
Payer: COMMERCIAL

## 2020-11-05 ENCOUNTER — RESULT REVIEW (OUTPATIENT)
Age: 65
End: 2020-11-05

## 2020-11-05 PROCEDURE — 30117 REMOVAL OF INTRANASAL LESION: CPT | Mod: RT,59

## 2020-11-05 PROCEDURE — 88300 SURGICAL PATH GROSS: CPT | Mod: 26

## 2020-11-05 PROCEDURE — 30520 REPAIR OF NASAL SEPTUM: CPT

## 2020-11-05 PROCEDURE — 30903 CONTROL OF NOSEBLEED: CPT | Mod: RT

## 2020-11-10 LAB — SURGICAL PATHOLOGY STUDY: SIGNIFICANT CHANGE UP

## 2020-11-13 ENCOUNTER — NON-APPOINTMENT (OUTPATIENT)
Age: 65
End: 2020-11-13

## 2020-11-17 ENCOUNTER — APPOINTMENT (OUTPATIENT)
Dept: INTERNAL MEDICINE | Facility: CLINIC | Age: 65
End: 2020-11-17
Payer: COMMERCIAL

## 2020-11-17 ENCOUNTER — APPOINTMENT (OUTPATIENT)
Dept: OTOLARYNGOLOGY | Facility: CLINIC | Age: 65
End: 2020-11-17
Payer: COMMERCIAL

## 2020-11-17 PROCEDURE — 99024 POSTOP FOLLOW-UP VISIT: CPT

## 2020-11-17 PROCEDURE — 31237 NSL/SINS NDSC SURG BX POLYPC: CPT | Mod: 58

## 2020-11-17 PROCEDURE — 96372 THER/PROPH/DIAG INJ SC/IM: CPT

## 2020-11-17 RX ORDER — CYANOCOBALAMIN 1000 UG/ML
1000 INJECTION INTRAMUSCULAR; SUBCUTANEOUS
Qty: 0 | Refills: 0 | Status: COMPLETED | OUTPATIENT
Start: 2020-11-17

## 2020-11-17 RX ORDER — AMOXICILLIN 500 MG/1
500 CAPSULE ORAL
Qty: 14 | Refills: 0 | Status: COMPLETED | COMMUNITY
Start: 2020-11-04 | End: 2020-11-17

## 2020-11-17 RX ORDER — ACETAMINOPHEN AND CODEINE 300; 30 MG/1; MG/1
300-30 TABLET ORAL
Qty: 10 | Refills: 0 | Status: COMPLETED | COMMUNITY
Start: 2020-11-04 | End: 2020-11-17

## 2020-11-17 RX ADMIN — CYANOCOBALAMIN 0 MCG/ML: 1000 INJECTION, SOLUTION INTRAMUSCULAR at 00:00

## 2020-11-17 NOTE — PHYSICAL EXAM
[Normal] : no rashes [de-identified] : Acute spontaneous epistaxis Rt side, no hx of trauma.\par scoped and packed

## 2020-11-17 NOTE — PROCEDURE
HD#2    Patient seen and examined at bedside, no acute overnight events. Transfused 3U overnight, with much symptomatic improvement. Denies fevers/chills. She has been out of bed and ambulating without shortness of breath, dizziness, or chest pain. Her bleeding is improved, she only used one pad overnight. Denies pain. Denies CP/SOB/N/V.     Vital Signs Last 24 Hours  T(C): 37.1, Max: 37.6 (05-25 @ 18:30)  HR: 83 (83 - 131)  BP: 117/72 (101/47 - 146/88)  RR: 16 (16 - 20)  SpO2: 100% (100% - 100%)  Wt(kg): --    I&O's Summary    I & Os for current day (as of 26 May 2017 06:35)  =============================================  IN: 0 ml / OUT: 550 ml / NET: -550 ml      Physical Exam:  General: NAD  CV: NR, RR, S1, S2, no M/R/G  Lungs: CTA-B  Abdomen: Soft, non-tender, non-distended, +BS, mass palpable above umbilicus, fibroid from uterus  : scant bleeding on pad  Ext: No pain or swelling    Labs:                        4.7    9.41  )-----------( 243      ( 25 May 2017 16:15 )             18.0   baso 0.5    eos 3.0    imm gran 0.9    lymph 15.8   mono 6.9    poly 72.9       MEDICATIONS  (STANDING):  multivitamin 1Tablet(s) Oral daily  ferrous    sulfate 325milliGRAM(s) Oral three times a day with meals  ascorbic acid 500milliGRAM(s) Oral three times a day  docusate sodium 100milliGRAM(s) Oral two times a day  norethindrone acetate 5milliGRAM(s) Oral daily    MEDICATIONS  (PRN):  ibuprofen  Tablet 400milliGRAM(s) Oral every 6 hours PRN moderate pain    Uterus: 18.2 x 10.8 x 15.0 cm. Enlarged with relatively heterogeneous   myometrium.  Endometrium: 21 mm and distended with heterogeneous material, without   vascularity.  Right ovary: 2.3 x 3.3 x 2.6 cm with a 1.6 cm cyst. Within normal limits.  Left ovary: 2.5 x 3.3 x 2.6 cm. Within normal limits.  Free fluid: None.    IMPRESSION:  Markedly distended and heterogeneous endometrial cavity can be secondary   to blood products. However, an underlying focal lesion is not excluded on   this exam.  Enlarged uterus with heterogeneous myometrium. [Image(s) Captured] : image(s) captured and filed [Debridement] : debridement  [Epistaxis] : evaluation of epistaxis [Topical Lidocaine] : topical lidocaine [Rigid Endoscope] : examined with a rigid endoscope [Serial Number: ___] : Serial Number: [unfilled] [Nasal Septum Mucosa Bleeding] : no bleeding [Nasal Septum Mucosa Bleeding Right] : bleeding on the right [Cauterized w/Silver Nitrate] : the bleeding was cauterized with silver nitrate [Bilateral] : bilateral debridement of the nasal cavity [Moderate] : moderate [Leonard] : on both sides [Removed] : which was removed [FreeTextEntry6] : \par  [FreeTextEntry2] : Laser Assisted Control of Right Side Epistaxis .( Diode Laser )

## 2020-11-17 NOTE — REASON FOR VISIT
[Post-Operative Visit] : a post-operative visit [FreeTextEntry2] : Status post Open SMR and Right sided Laser Assisted Control of Epistaxis

## 2020-11-17 NOTE — HISTORY OF PRESENT ILLNESS
[de-identified] : 65 years old male patient with history of  Status post Open SMR and Right sided Laser Assisted Control of Epistaxis .  Patient is present today in the office  for nasal debridement.  Bacitracin ointment was inserted into patient right nostril for lubrication

## 2020-11-23 ENCOUNTER — APPOINTMENT (OUTPATIENT)
Dept: INTERNAL MEDICINE | Facility: CLINIC | Age: 65
End: 2020-11-23
Payer: COMMERCIAL

## 2020-11-23 PROCEDURE — 36415 COLL VENOUS BLD VENIPUNCTURE: CPT

## 2020-11-30 LAB
ALBUMIN SERPL ELPH-MCNC: 4.4 G/DL
ALP BLD-CCNC: 41 U/L
ALT SERPL-CCNC: 52 U/L
ANION GAP SERPL CALC-SCNC: 12 MMOL/L
AST SERPL-CCNC: 53 U/L
BASOPHILS # BLD AUTO: 0.02 K/UL
BASOPHILS NFR BLD AUTO: 0.4 %
BILIRUB SERPL-MCNC: 0.6 MG/DL
BUN SERPL-MCNC: 20 MG/DL
CALCIUM SERPL-MCNC: 9.2 MG/DL
CHLORIDE SERPL-SCNC: 109 MMOL/L
CHOLEST SERPL-MCNC: 122 MG/DL
CO2 SERPL-SCNC: 24 MMOL/L
CREAT SERPL-MCNC: 1.66 MG/DL
CRP SERPL-MCNC: <0.1 MG/DL
EOSINOPHIL # BLD AUTO: 0.08 K/UL
EOSINOPHIL NFR BLD AUTO: 1.6 %
ERYTHROCYTE [SEDIMENTATION RATE] IN BLOOD BY WESTERGREN METHOD: 2 MM/HR
GLUCOSE SERPL-MCNC: 81 MG/DL
HCT VFR BLD CALC: 48.4 %
HDLC SERPL-MCNC: 47 MG/DL
HGB BLD-MCNC: 15.2 G/DL
IGE AB SERPL QL: 13 NG/ML
IMM GRANULOCYTES NFR BLD AUTO: 0 %
LDLC SERPL CALC-MCNC: 43 MG/DL
LYMPHOCYTES # BLD AUTO: 2.36 K/UL
LYMPHOCYTES NFR BLD AUTO: 46.5 %
MAN DIFF?: NORMAL
MCHC RBC-ENTMCNC: 29.5 PG
MCHC RBC-ENTMCNC: 31.4 GM/DL
MCV RBC AUTO: 93.8 FL
MONOCYTES # BLD AUTO: 0.48 K/UL
MONOCYTES NFR BLD AUTO: 9.4 %
NEUTROPHILS # BLD AUTO: 2.14 K/UL
NEUTROPHILS NFR BLD AUTO: 42.1 %
NONHDLC SERPL-MCNC: 76 MG/DL
PLATELET # BLD AUTO: 104 K/UL
POTASSIUM SERPL-SCNC: 4.7 MMOL/L
PROT SERPL-MCNC: 5.8 G/DL
PSA SERPL-MCNC: 1.71 NG/ML
RBC # BLD: 5.16 M/UL
RBC # FLD: 14.3 %
SODIUM SERPL-SCNC: 144 MMOL/L
TOTAL IGE SMQN RAST: 21 KU/L
TRIGL SERPL-MCNC: 166 MG/DL
TRYPTASE: 3.7 NG/ML
WBC # FLD AUTO: 5.08 K/UL

## 2020-12-02 ENCOUNTER — NON-APPOINTMENT (OUTPATIENT)
Age: 65
End: 2020-12-02

## 2020-12-03 ENCOUNTER — FORM ENCOUNTER (OUTPATIENT)
Age: 65
End: 2020-12-03

## 2020-12-10 DIAGNOSIS — L50.8 OTHER URTICARIA: ICD-10-CM

## 2020-12-21 ENCOUNTER — OUTPATIENT (OUTPATIENT)
Dept: OUTPATIENT SERVICES | Facility: HOSPITAL | Age: 65
LOS: 1 days | End: 2020-12-21
Payer: COMMERCIAL

## 2020-12-21 ENCOUNTER — LABORATORY RESULT (OUTPATIENT)
Age: 65
End: 2020-12-21

## 2020-12-21 ENCOUNTER — RESULT REVIEW (OUTPATIENT)
Age: 65
End: 2020-12-21

## 2020-12-21 DIAGNOSIS — R06.02 SHORTNESS OF BREATH: ICD-10-CM

## 2020-12-21 PROCEDURE — 71551 MRI CHEST W/DYE: CPT | Mod: 26

## 2020-12-21 PROCEDURE — 71551 MRI CHEST W/DYE: CPT

## 2020-12-21 PROCEDURE — A9585: CPT

## 2020-12-22 DIAGNOSIS — Z11.59 ENCOUNTER FOR SCREENING FOR OTHER VIRAL DISEASES: ICD-10-CM

## 2020-12-23 ENCOUNTER — APPOINTMENT (OUTPATIENT)
Dept: HEART AND VASCULAR | Facility: CLINIC | Age: 65
End: 2020-12-23
Payer: COMMERCIAL

## 2020-12-23 LAB
ALBUMIN SERPL ELPH-MCNC: 4.3 G/DL
ALP BLD-CCNC: 34 U/L
ALT SERPL-CCNC: 37 U/L
ANION GAP SERPL CALC-SCNC: 11 MMOL/L
AST SERPL-CCNC: 28 U/L
BILIRUB SERPL-MCNC: 0.6 MG/DL
BUN SERPL-MCNC: 24 MG/DL
CALCIUM SERPL-MCNC: 9.4 MG/DL
CHLORIDE SERPL-SCNC: 108 MMOL/L
CO2 SERPL-SCNC: 24 MMOL/L
CREAT SERPL-MCNC: 1.66 MG/DL
CYSTATIN C SERPL-MCNC: 0.99 MG/L
GFR/BSA.PRED SERPLBLD CYS-BASED-ARV: 77 ML/MIN
GLUCOSE SERPL-MCNC: 84 MG/DL
NT-PROBNP SERPL-MCNC: 111 PG/ML
POTASSIUM SERPL-SCNC: 5.1 MMOL/L
PROT SERPL-MCNC: 6 G/DL
SODIUM SERPL-SCNC: 143 MMOL/L

## 2020-12-23 PROCEDURE — 99072 ADDL SUPL MATRL&STAF TM PHE: CPT

## 2020-12-23 PROCEDURE — 93224 XTRNL ECG REC UP TO 48 HRS: CPT

## 2020-12-25 LAB — SARS-COV-2 N GENE NPH QL NAA+PROBE: NOT DETECTED

## 2021-03-02 ENCOUNTER — APPOINTMENT (OUTPATIENT)
Dept: OTOLARYNGOLOGY | Facility: CLINIC | Age: 66
End: 2021-03-02
Payer: COMMERCIAL

## 2021-03-02 PROCEDURE — 99072 ADDL SUPL MATRL&STAF TM PHE: CPT

## 2021-03-02 PROCEDURE — 99213 OFFICE O/P EST LOW 20 MIN: CPT | Mod: 25

## 2021-03-02 PROCEDURE — 30903 CONTROL OF NOSEBLEED: CPT

## 2021-03-02 NOTE — PHYSICAL EXAM
[Normal] : no rashes [de-identified] : Acute spontaneous epistaxis Rt side, no hx of trauma.\par scoped and packed [de-identified] : Bleeding Rt side cauterized w silver nortite and packed

## 2021-03-02 NOTE — HISTORY OF PRESENT ILLNESS
[de-identified] : 65 years old male patient with history of  Status post Open SMR and Right sided Laser Assisted Control of Epistaxis .  Patient is present today in the office  for right  nasal debridement. No active right side nasal epistaxis. No damage or rupture of the vasculature area  in the nasal cavity.   Increased Right nasal crusting.  Bacitracin ointment was inserted into patient right nostril for lubrication  and moisturizing agent may serve to mitigate further epistaxis providing dryness is problematic.

## 2021-03-02 NOTE — REASON FOR VISIT
[Subsequent Evaluation] : a subsequent evaluation for [FreeTextEntry2] : Status post Open SMR and Right sided Laser Assisted Control of Epistaxis .  Recurrent right sided epistaxis for the  past couple of days

## 2021-03-02 NOTE — PROCEDURE
[Image(s) Captured] : image(s) captured and filed [Debridement] : debridement  [Epistaxis] : evaluation of epistaxis [Topical Lidocaine] : topical lidocaine [Rigid Endoscope] : examined with a rigid endoscope [Serial Number: ___] : Serial Number: [unfilled] [Nasal Septum Mucosa Bleeding] : no bleeding [Nasal Septum Mucosa Bleeding Right] : bleeding on the right [Bilateral] : bilateral debridement of the nasal cavity [Moderate] : moderate [Rt] : on the right [Removed] : which was removed [Cauterized w/Silver Nitrate] : the bleeding was not cauterized with silver nitrate [FreeTextEntry6] : \par  [FreeTextEntry2] : No active epistaxis

## 2021-03-05 LAB
ALBUMIN SERPL ELPH-MCNC: 4.2 G/DL
ALP BLD-CCNC: 56 U/L
ALT SERPL-CCNC: 31 U/L
ANION GAP SERPL CALC-SCNC: 7 MMOL/L
APO LP(A) SERPL-MCNC: 205.8 NMOL/L
AST SERPL-CCNC: 29 U/L
BASOPHILS # BLD AUTO: 0.01 K/UL
BASOPHILS NFR BLD AUTO: 0.2 %
BILIRUB SERPL-MCNC: 0.6 MG/DL
BUN SERPL-MCNC: 19 MG/DL
CALCIUM SERPL-MCNC: 9.3 MG/DL
CHLORIDE SERPL-SCNC: 106 MMOL/L
CHOLEST SERPL-MCNC: 132 MG/DL
CO2 SERPL-SCNC: 27 MMOL/L
CREAT SERPL-MCNC: 1.29 MG/DL
CRP SERPL-MCNC: <3 MG/L
CYSTATIN C SERPL-MCNC: 1.07 MG/L
EOSINOPHIL # BLD AUTO: 0.06 K/UL
EOSINOPHIL NFR BLD AUTO: 1.3 %
GFR/BSA.PRED SERPLBLD CYS-BASED-ARV: 69 ML/MIN
GLUCOSE SERPL-MCNC: 90 MG/DL
HCT VFR BLD CALC: 48.9 %
HDLC SERPL-MCNC: 52 MG/DL
HGB BLD-MCNC: 14.9 G/DL
HIV1+2 AB SPEC QL IA.RAPID: NONREACTIVE
IMM GRANULOCYTES NFR BLD AUTO: 0.2 %
LDLC SERPL CALC-MCNC: 57 MG/DL
LYMPHOCYTES # BLD AUTO: 2.13 K/UL
LYMPHOCYTES NFR BLD AUTO: 44.7 %
MAN DIFF?: NORMAL
MCHC RBC-ENTMCNC: 29.6 PG
MCHC RBC-ENTMCNC: 30.5 GM/DL
MCV RBC AUTO: 97 FL
MONOCYTES # BLD AUTO: 0.57 K/UL
MONOCYTES NFR BLD AUTO: 11.9 %
NEUTROPHILS # BLD AUTO: 1.99 K/UL
NEUTROPHILS NFR BLD AUTO: 41.7 %
NONHDLC SERPL-MCNC: 80 MG/DL
NT-PROBNP SERPL-MCNC: 56 PG/ML
PLATELET # BLD AUTO: 145 K/UL
POTASSIUM SERPL-SCNC: 4.8 MMOL/L
PROT SERPL-MCNC: 6.4 G/DL
PSA SERPL-MCNC: 1.57 NG/ML
RBC # BLD: 5.04 M/UL
RBC # FLD: 15.9 %
SODIUM SERPL-SCNC: 139 MMOL/L
T PALLIDUM AB SER QL IA: NEGATIVE
TESTOST SERPL-MCNC: >1500 NG/DL
TRIGL SERPL-MCNC: 113 MG/DL
WBC # FLD AUTO: 4.77 K/UL

## 2021-03-08 LAB — RPR SER-TITR: NORMAL

## 2021-03-31 DIAGNOSIS — Z87.438 PERSONAL HISTORY OF OTHER DISEASES OF MALE GENITAL ORGANS: ICD-10-CM

## 2021-04-05 LAB
CYSTATIN C SERPL-MCNC: 1.15 MG/L
GFR/BSA.PRED SERPLBLD CYS-BASED-ARV: 63 ML/MIN

## 2021-04-06 ENCOUNTER — RX RENEWAL (OUTPATIENT)
Age: 66
End: 2021-04-06

## 2021-04-06 LAB
25(OH)D3 SERPL-MCNC: 141 NG/ML
ALBUMIN SERPL ELPH-MCNC: 4.5 G/DL
ALP BLD-CCNC: 73 U/L
ALT SERPL-CCNC: 24 U/L
ANION GAP SERPL CALC-SCNC: 14 MMOL/L
APO LP(A) SERPL-MCNC: 224.4 NMOL/L
AST SERPL-CCNC: 28 U/L
BASOPHILS # BLD AUTO: 0.02 K/UL
BASOPHILS NFR BLD AUTO: 0.3 %
BILIRUB SERPL-MCNC: 0.6 MG/DL
BUN SERPL-MCNC: 19 MG/DL
CALCIUM SERPL-MCNC: 10 MG/DL
CHLORIDE SERPL-SCNC: 103 MMOL/L
CHOLEST SERPL-MCNC: 150 MG/DL
CO2 SERPL-SCNC: 23 MMOL/L
CREAT SERPL-MCNC: 1.31 MG/DL
EOSINOPHIL # BLD AUTO: 0.27 K/UL
EOSINOPHIL NFR BLD AUTO: 4.7 %
ESTRADIOL SERPL-MCNC: 49 PG/ML
GLUCOSE SERPL-MCNC: 88 MG/DL
HCT VFR BLD CALC: 50.2 %
HDLC SERPL-MCNC: 51 MG/DL
HGB BLD-MCNC: 15.6 G/DL
IMM GRANULOCYTES NFR BLD AUTO: 0.2 %
LDLC SERPL CALC-MCNC: 66 MG/DL
LYMPHOCYTES # BLD AUTO: 2.08 K/UL
LYMPHOCYTES NFR BLD AUTO: 35.9 %
MAN DIFF?: NORMAL
MCHC RBC-ENTMCNC: 29 PG
MCHC RBC-ENTMCNC: 31.1 GM/DL
MCV RBC AUTO: 93.3 FL
MONOCYTES # BLD AUTO: 0.59 K/UL
MONOCYTES NFR BLD AUTO: 10.2 %
NEUTROPHILS # BLD AUTO: 2.83 K/UL
NEUTROPHILS NFR BLD AUTO: 48.7 %
NONHDLC SERPL-MCNC: 99 MG/DL
NT-PROBNP SERPL-MCNC: 37 PG/ML
PLATELET # BLD AUTO: 177 K/UL
POTASSIUM SERPL-SCNC: 5 MMOL/L
PROT SERPL-MCNC: 6.8 G/DL
PSA SERPL-MCNC: 1.68 NG/ML
RBC # BLD: 5.38 M/UL
RBC # FLD: 15.4 %
SODIUM SERPL-SCNC: 140 MMOL/L
T4 FREE SERPL-MCNC: 0.9 NG/DL
TESTOST SERPL-MCNC: 1398 NG/DL
TRIGL SERPL-MCNC: 163 MG/DL
TSH SERPL-ACNC: 1.57 UIU/ML
WBC # FLD AUTO: 5.8 K/UL

## 2021-04-12 ENCOUNTER — OUTPATIENT (OUTPATIENT)
Dept: OUTPATIENT SERVICES | Facility: HOSPITAL | Age: 66
LOS: 1 days | End: 2021-04-12
Payer: COMMERCIAL

## 2021-04-12 ENCOUNTER — APPOINTMENT (OUTPATIENT)
Dept: MRI IMAGING | Facility: HOSPITAL | Age: 66
End: 2021-04-12

## 2021-04-12 PROCEDURE — 70543 MRI ORBT/FAC/NCK W/O &W/DYE: CPT | Mod: 26

## 2021-04-12 PROCEDURE — 71551 MRI CHEST W/DYE: CPT | Mod: 26

## 2021-04-12 PROCEDURE — A9585: CPT

## 2021-04-12 PROCEDURE — 70543 MRI ORBT/FAC/NCK W/O &W/DYE: CPT

## 2021-04-12 PROCEDURE — 71551 MRI CHEST W/DYE: CPT

## 2021-04-13 ENCOUNTER — RESULT REVIEW (OUTPATIENT)
Age: 66
End: 2021-04-13

## 2021-04-15 RX ORDER — ERGOCALCIFEROL 1.25 MG/1
1.25 MG CAPSULE, LIQUID FILLED ORAL
Qty: 12 | Refills: 3 | Status: ACTIVE | COMMUNITY
Start: 2016-05-17 | End: 1900-01-01

## 2021-04-22 ENCOUNTER — APPOINTMENT (OUTPATIENT)
Dept: NEUROLOGY | Facility: CLINIC | Age: 66
End: 2021-04-22
Payer: COMMERCIAL

## 2021-04-22 VITALS
RESPIRATION RATE: 16 BRPM | WEIGHT: 152 LBS | HEART RATE: 73 BPM | HEIGHT: 68 IN | BODY MASS INDEX: 23.04 KG/M2 | TEMPERATURE: 98.1 F | SYSTOLIC BLOOD PRESSURE: 128 MMHG | OXYGEN SATURATION: 94 % | DIASTOLIC BLOOD PRESSURE: 77 MMHG

## 2021-04-22 PROCEDURE — 99214 OFFICE O/P EST MOD 30 MIN: CPT | Mod: 25

## 2021-04-22 PROCEDURE — 20553 NJX 1/MLT TRIGGER POINTS 3/>: CPT

## 2021-04-22 PROCEDURE — 99072 ADDL SUPL MATRL&STAF TM PHE: CPT

## 2021-04-22 RX ORDER — TAMSULOSIN HYDROCHLORIDE 0.4 MG/1
0.4 CAPSULE ORAL
Qty: 30 | Refills: 0 | Status: COMPLETED | COMMUNITY
Start: 2017-01-05 | End: 2021-04-22

## 2021-04-22 RX ORDER — TERAZOSIN 2 MG/1
2 CAPSULE ORAL
Qty: 60 | Refills: 0 | Status: COMPLETED | COMMUNITY
Start: 2020-08-31 | End: 2021-04-22

## 2021-04-22 RX ORDER — LEVOMEFOLATE CALCIUM 15 MG
15 TABLET ORAL
Qty: 30 | Refills: 2 | Status: COMPLETED | COMMUNITY
Start: 2018-07-09 | End: 2021-04-22

## 2021-04-22 RX ORDER — TOPIRAMATE 25 MG/1
25 TABLET, FILM COATED ORAL
Qty: 10 | Refills: 0 | Status: COMPLETED | COMMUNITY
Start: 2020-08-18 | End: 2021-04-22

## 2021-04-22 RX ORDER — GLYCOPYRROLATE 2 MG/1
2 TABLET ORAL TWICE DAILY
Qty: 30 | Refills: 3 | Status: COMPLETED | COMMUNITY
Start: 2019-04-25 | End: 2021-04-22

## 2021-04-22 RX ORDER — CELECOXIB 100 MG/1
100 CAPSULE ORAL
Qty: 60 | Refills: 0 | Status: COMPLETED | COMMUNITY
Start: 2017-03-14 | End: 2021-04-22

## 2021-04-22 RX ORDER — DIFLUPREDNATE 0.5 MG/ML
0.05 EMULSION OPHTHALMIC
Qty: 5 | Refills: 0 | Status: COMPLETED | COMMUNITY
Start: 2016-08-23 | End: 2021-04-22

## 2021-04-22 RX ORDER — METHYLPREDNISOLONE 4 MG/1
4 TABLET ORAL
Qty: 1 | Refills: 0 | Status: COMPLETED | COMMUNITY
Start: 2020-11-04 | End: 2021-04-22

## 2021-04-22 RX ORDER — BRIMONIDINE TARTRATE, TIMOLOL MALEATE 2; 5 MG/ML; MG/ML
0.2-0.5 SOLUTION/ DROPS OPHTHALMIC
Qty: 5 | Refills: 0 | Status: COMPLETED | COMMUNITY
Start: 2017-01-04 | End: 2021-04-22

## 2021-04-22 RX ORDER — RAMELTEON 8 MG/1
8 TABLET, FILM COATED ORAL
Qty: 30 | Refills: 6 | Status: COMPLETED | COMMUNITY
Start: 2018-10-03 | End: 2021-04-22

## 2021-04-22 RX ORDER — SULFAMETHOXAZOLE AND TRIMETHOPRIM 800; 160 MG/1; MG/1
800-160 TABLET ORAL
Qty: 60 | Refills: 0 | Status: COMPLETED | COMMUNITY
Start: 2020-05-08 | End: 2021-04-22

## 2021-04-22 RX ORDER — DOCUSATE SODIUM 100 MG/1
100 CAPSULE ORAL TWICE DAILY
Qty: 1 | Refills: 0 | Status: COMPLETED | COMMUNITY
Start: 2020-11-04 | End: 2021-04-22

## 2021-04-22 RX ORDER — ATENOLOL 25 MG/1
25 TABLET ORAL
Qty: 180 | Refills: 0 | Status: COMPLETED | COMMUNITY
Start: 2017-01-09 | End: 2021-04-22

## 2021-04-22 RX ORDER — ECONAZOLE NITRATE 10 MG/G
1 CREAM TOPICAL
Qty: 85 | Refills: 0 | Status: COMPLETED | COMMUNITY
Start: 2020-05-20 | End: 2021-04-22

## 2021-04-22 RX ORDER — VENLAFAXINE HYDROCHLORIDE 75 MG/1
75 CAPSULE, EXTENDED RELEASE ORAL
Qty: 30 | Refills: 3 | Status: COMPLETED | COMMUNITY
Start: 2018-10-30 | End: 2021-04-22

## 2021-04-22 RX ORDER — HYDROXYCHLOROQUINE SULFATE 200 MG/1
200 TABLET, FILM COATED ORAL
Qty: 65 | Refills: 0 | Status: COMPLETED | COMMUNITY
Start: 2020-03-16 | End: 2021-04-22

## 2021-04-22 RX ORDER — SUMATRIPTAN 20 MG/1
20 SPRAY NASAL
Qty: 6 | Refills: 0 | Status: COMPLETED | COMMUNITY
Start: 2017-03-14 | End: 2021-04-22

## 2021-04-22 RX ORDER — TADALAFIL 5 MG/1
5 TABLET, FILM COATED ORAL
Qty: 30 | Refills: 6 | Status: COMPLETED | COMMUNITY
Start: 2017-08-17 | End: 2021-04-22

## 2021-04-22 RX ORDER — RIBAVIRIN 200 MG/1
200 TABLET, FILM COATED ORAL
Qty: 84 | Refills: 2 | Status: COMPLETED | COMMUNITY
Start: 2020-10-02 | End: 2021-04-22

## 2021-04-22 RX ORDER — TERAZOSIN 1 MG/1
1 CAPSULE ORAL
Qty: 30 | Refills: 0 | Status: COMPLETED | COMMUNITY
Start: 2020-07-04 | End: 2021-04-22

## 2021-04-22 RX ORDER — TIMOLOL MALEATE 5 MG/ML
0.5 SOLUTION OPHTHALMIC
Qty: 5 | Refills: 0 | Status: COMPLETED | COMMUNITY
Start: 2016-12-06 | End: 2021-04-22

## 2021-04-22 RX ORDER — DICLOFENAC SODIUM 75 MG/1
75 TABLET, DELAYED RELEASE ORAL
Qty: 60 | Refills: 0 | Status: COMPLETED | COMMUNITY
Start: 2016-09-23 | End: 2021-04-22

## 2021-04-22 RX ORDER — HYDROXYCHLOROQUINE SULFATE 200 MG/1
200 TABLET, FILM COATED ORAL
Qty: 60 | Refills: 0 | Status: COMPLETED | COMMUNITY
Start: 2016-12-05 | End: 2021-04-22

## 2021-04-22 RX ORDER — TRAMADOL HYDROCHLORIDE 50 MG/1
50 TABLET, COATED ORAL
Qty: 60 | Refills: 2 | Status: COMPLETED | COMMUNITY
Start: 2019-04-12 | End: 2021-04-22

## 2021-04-22 RX ORDER — IRBESARTAN 150 MG/1
150 TABLET ORAL
Qty: 90 | Refills: 3 | Status: COMPLETED | COMMUNITY
Start: 2019-08-30 | End: 2021-04-22

## 2021-04-22 RX ORDER — BUPROPION HYDROCHLORIDE 150 MG/1
150 TABLET, EXTENDED RELEASE ORAL
Qty: 30 | Refills: 0 | Status: COMPLETED | COMMUNITY
Start: 2020-07-31 | End: 2021-04-22

## 2021-04-22 RX ORDER — FLUTICASONE PROPIONATE 220 UG/1
220 AEROSOL, METERED RESPIRATORY (INHALATION)
Qty: 12 | Refills: 0 | Status: COMPLETED | COMMUNITY
Start: 2019-01-31 | End: 2021-04-22

## 2021-04-22 RX ORDER — AVANAFIL 200 MG/1
200 TABLET ORAL DAILY
Qty: 6 | Refills: 0 | Status: COMPLETED | COMMUNITY
Start: 2019-02-14 | End: 2021-04-22

## 2021-04-22 RX ORDER — FAMOTIDINE 40 MG/1
40 TABLET, FILM COATED ORAL
Qty: 200 | Refills: 0 | Status: COMPLETED | COMMUNITY
Start: 2020-05-20 | End: 2021-04-22

## 2021-04-22 RX ORDER — ATORVASTATIN CALCIUM 40 MG/1
40 TABLET, FILM COATED ORAL
Qty: 30 | Refills: 0 | Status: COMPLETED | COMMUNITY
Start: 2017-01-15 | End: 2021-04-22

## 2021-04-22 RX ORDER — VENLAFAXINE 25 MG/1
25 TABLET ORAL
Qty: 60 | Refills: 0 | Status: COMPLETED | COMMUNITY
Start: 2020-10-20 | End: 2021-04-22

## 2021-04-22 RX ORDER — RIFAPENTINE 150 MG/1
150 TABLET, FILM COATED ORAL
Qty: 24 | Refills: 0 | Status: COMPLETED | COMMUNITY
Start: 2020-06-09 | End: 2021-04-22

## 2021-04-22 RX ORDER — PREDNISOLONE ACETATE 10 MG/ML
1 SUSPENSION/ DROPS OPHTHALMIC
Qty: 5 | Refills: 0 | Status: COMPLETED | COMMUNITY
Start: 2016-09-22 | End: 2021-04-22

## 2021-04-22 RX ORDER — TOBRAMYCIN AND DEXAMETHASONE 3; 1 MG/ML; MG/ML
0.3-0.1 SUSPENSION/ DROPS OPHTHALMIC
Qty: 5 | Refills: 0 | Status: COMPLETED | COMMUNITY
Start: 2016-12-25 | End: 2021-04-22

## 2021-04-22 RX ORDER — CYCLOPENTOLATE HYDROCHLORIDE 10 MG/ML
1 SOLUTION OPHTHALMIC
Qty: 2 | Refills: 0 | Status: COMPLETED | COMMUNITY
Start: 2016-10-24 | End: 2021-04-22

## 2021-04-22 RX ORDER — DIHYDROERGOTAMINE MESYLATE 1 MG/ML
1 INJECTION, SOLUTION INTRAMUSCULAR; INTRAVENOUS; SUBCUTANEOUS
Qty: 10 | Refills: 0 | Status: COMPLETED | COMMUNITY
Start: 2019-12-16 | End: 2021-04-22

## 2021-04-22 RX ORDER — TRAMADOL HYDROCHLORIDE 100 MG/1
100 TABLET, EXTENDED RELEASE ORAL DAILY
Qty: 30 | Refills: 0 | Status: COMPLETED | COMMUNITY
Start: 2019-12-20 | End: 2021-04-22

## 2021-04-22 RX ORDER — ISONIAZID 300 MG/1
300 TABLET ORAL
Qty: 30 | Refills: 0 | Status: COMPLETED | COMMUNITY
Start: 2020-06-09 | End: 2021-04-22

## 2021-04-22 RX ORDER — PITOLISANT HYDROCHLORIDE 4.45 MG/1
4.45 TABLET, FILM COATED ORAL
Qty: 90 | Refills: 3 | Status: COMPLETED | OUTPATIENT
Start: 2020-09-03 | End: 2021-04-22

## 2021-04-22 RX ORDER — ACYCLOVIR 400 MG/1
400 TABLET ORAL
Qty: 45 | Refills: 0 | Status: COMPLETED | COMMUNITY
Start: 2017-06-21 | End: 2021-04-22

## 2021-04-22 RX ORDER — PROPRANOLOL HYDROCHLORIDE 20 MG/1
20 TABLET ORAL
Qty: 30 | Refills: 0 | Status: COMPLETED | COMMUNITY
Start: 2016-09-26 | End: 2021-04-22

## 2021-04-22 RX ORDER — HYDROCHLOROTHIAZIDE 25 MG/1
25 TABLET ORAL DAILY
Qty: 90 | Refills: 3 | Status: COMPLETED | COMMUNITY
Start: 2019-08-30 | End: 2021-04-22

## 2021-04-22 RX ORDER — RIFAMPIN 300 MG/1
300 CAPSULE ORAL
Qty: 60 | Refills: 0 | Status: COMPLETED | COMMUNITY
Start: 2020-05-08 | End: 2021-04-22

## 2021-04-22 RX ORDER — DRONABINOL 2.5 MG/1
2.5 CAPSULE ORAL
Qty: 30 | Refills: 0 | Status: COMPLETED | COMMUNITY
Start: 2019-10-22 | End: 2021-04-22

## 2021-04-22 RX ORDER — BUTALBITAL, ACETAMINOPHEN, AND CAFFEINE 50; 300; 40 MG/1; MG/1; MG/1
50-300-40 CAPSULE ORAL
Qty: 90 | Refills: 0 | Status: COMPLETED | COMMUNITY
Start: 2016-09-09 | End: 2021-04-22

## 2021-04-22 RX ORDER — NITAZOXANIDE 500 MG/1
500 TABLET ORAL TWICE DAILY
Qty: 60 | Refills: 0 | Status: COMPLETED | COMMUNITY
Start: 2020-12-02 | End: 2021-04-22

## 2021-04-22 RX ORDER — BUPROPION HYDROCHLORIDE 75 MG/1
75 TABLET, FILM COATED ORAL
Qty: 100 | Refills: 0 | Status: COMPLETED | COMMUNITY
Start: 2020-06-02 | End: 2021-04-22

## 2021-04-22 RX ORDER — BUPROPION HYDROCHLORIDE 300 MG/1
300 TABLET, EXTENDED RELEASE ORAL
Qty: 30 | Refills: 0 | Status: COMPLETED | COMMUNITY
Start: 2020-08-10 | End: 2021-04-22

## 2021-04-22 RX ORDER — ALBUTEROL SULFATE 90 UG/1
108 (90 BASE) AEROSOL, METERED RESPIRATORY (INHALATION)
Qty: 18 | Refills: 0 | Status: COMPLETED | COMMUNITY
Start: 2019-01-31 | End: 2021-04-22

## 2021-04-22 RX ORDER — FAMOTIDINE 20 MG/1
20 TABLET, FILM COATED ORAL
Qty: 200 | Refills: 0 | Status: COMPLETED | COMMUNITY
Start: 2020-05-20 | End: 2021-04-22

## 2021-04-22 RX ORDER — PREDNISONE 1 MG/1
1 TABLET ORAL
Qty: 480 | Refills: 0 | Status: COMPLETED | COMMUNITY
Start: 2020-08-10 | End: 2021-04-22

## 2021-04-22 RX ORDER — ESOMEPRAZOLE MAGNESIUM 40 MG/1
40 CAPSULE, DELAYED RELEASE ORAL
Qty: 30 | Refills: 0 | Status: COMPLETED | COMMUNITY
Start: 2017-03-15 | End: 2021-04-22

## 2021-04-22 RX ORDER — METOPROLOL SUCCINATE 100 MG/1
100 TABLET, EXTENDED RELEASE ORAL
Qty: 60 | Refills: 0 | Status: COMPLETED | COMMUNITY
Start: 2020-06-02 | End: 2021-04-22

## 2021-04-22 RX ORDER — LEVOMEFOLATE/ALGAL OIL 7.5-90.314
7.5-90.314 CAPSULE ORAL
Qty: 60 | Refills: 2 | Status: COMPLETED | COMMUNITY
Start: 2018-07-11 | End: 2021-04-22

## 2021-04-22 RX ORDER — CABERGOLINE 0.5 MG/1
0.5 TABLET ORAL
Qty: 96 | Refills: 0 | Status: COMPLETED | COMMUNITY
Start: 2020-05-27 | End: 2021-04-22

## 2021-04-22 NOTE — PROCEDURE
[FreeTextEntry3] : Intramuscular (non-articular, non-tendonous) Trigger point injections were explained to the patient, and potential complications discussed including pain from site locally, and rarely infection or bleeding from these local sites. \par The areas to be injected were cleaned with alcohol swabs and allowed to dry prior to injection.\par A sterile solution of 3:1.5:1.5 ratio normal saline:4%dexamethasone:2%lidocaine no epi was drawn.\par \par Lot #s: \par Lidocaine 2.5%/Prilocaine 2.5% topical NDC 19168-862-38, exp 10/21\par 0.9% saline: NDC 9425-0322-14 lot [id number] exp 10/21\par dexamethasone 20mg/5ml NDC 66834-487-02 Lot [id number] Exp 02/21\par bupivacaine/sensorcaine 0.5% NDC 42000-581-22 [id number] 02/23\par \par A total of 6 cc was injected in the following regions:\par 1) splenius capitus x 2\par 2) sternocleidomastoid\par 3) semispinalis capitus \par \par The patient tolerated the procedure without issues.\par \par

## 2021-05-10 ENCOUNTER — RX RENEWAL (OUTPATIENT)
Age: 66
End: 2021-05-10

## 2021-05-10 NOTE — PHYSICAL EXAM
[FreeTextEntry1] : General:\par Constitutional:  Sitting comfortably in NAD.\par Psychiatric: well-groomed, appropriate affect\par Ears, Nose, Throat: no abnormalities, mucus membranes moist\par Head/Neck: supple, \par Extremities: no edema, clubbing or cyanosis\par Skin: no rash or neuro-cutaneous signs \par \par Cognitive:\par Orientation, language, memory and knowledge screens intact.\par \par Cranial Nerves:\par II: SANTINO. III/IV/VI: EOM Full.  VII: Face appears symmetric VIII: Normal to screening\par IX/X: normal phonation  XI: Trapezius Symmetric  XII: Tongue midline\par Motor:\par Power: no pronator drift\par \par Narrow based gait\par

## 2021-05-10 NOTE — HISTORY OF PRESENT ILLNESS
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   Rarely on the right.  The headache is constant for about 4 years.\par Had tried indomethacin, for a few days that helped.  But was worried about long-term use and stopped.  Even PRN it may help.\par Tried verapamil - 240mg CD qhs with some improvement.\par Gabapentin as well.\par Awakens not refreshed.\par Tried botox in 2018 w/ Dr Armstrong, but found it ineffective\par Feels he needs to lay down which may help.\par There is a sensation in the left side of his head, headache develops through the day, around 3-4pm, sometimes left and sometimes diffuse.  \par He reports having some myalgias, at the suboccipital region and forehead regions.\par \par Also c/o Brain fog.  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  Tested for sleep apnea - was positive but now negative.\par Insomnia note.\par \par Now off all neuro-medications.  Is taking CBD, sleeps through the night and awakens feeling a bit more rested.\par \par Has been seen by rheumatology - PET showed hyperactivity in the aorta and carotids.  Started on actemra.\par Low dose methotrexate and low dose prednisone.  Giant cell arteritis as a diagnosis, and symptoms resolved on PET (never had inflammatory markers).  Radiology now negative.\par \par Currently: Repatha, methotrexate 7.5 s/c, testosterone. \par nausea improving.\par This past week he has been feeling better.\par \par Mother may have the same symptoms - responded to antidepressant venlafaxine.\par

## 2021-05-10 NOTE — DISCUSSION/SUMMARY
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  Has tried indomethacin, but not for much longer than a couple weeks.  For full treatment, would require more prolonged treatment.\par 3) brain fog\par \par Plan:\par 1) improvement of sleep and headaches seen with short-term use of indomethacin and also allegra.\par 2) pt considering other options, such as trigger point injections which can be done today.  They may help temporarily, though multiple treatments would be necessary due to the chronicity of the condition.

## 2021-06-04 ENCOUNTER — APPOINTMENT (OUTPATIENT)
Dept: NEUROLOGY | Facility: CLINIC | Age: 66
End: 2021-06-04
Payer: COMMERCIAL

## 2021-06-04 VITALS
SYSTOLIC BLOOD PRESSURE: 125 MMHG | BODY MASS INDEX: 23.04 KG/M2 | HEART RATE: 56 BPM | DIASTOLIC BLOOD PRESSURE: 70 MMHG | HEIGHT: 68 IN | WEIGHT: 152 LBS | OXYGEN SATURATION: 97 % | TEMPERATURE: 97.7 F

## 2021-06-04 DIAGNOSIS — G47.19 OTHER HYPERSOMNIA: ICD-10-CM

## 2021-06-04 DIAGNOSIS — Z86.69 PERSONAL HISTORY OF OTHER DISEASES OF THE NERVOUS SYSTEM AND SENSE ORGANS: ICD-10-CM

## 2021-06-04 DIAGNOSIS — M79.10 MYALGIA, UNSPECIFIED SITE: ICD-10-CM

## 2021-06-04 PROCEDURE — 99072 ADDL SUPL MATRL&STAF TM PHE: CPT

## 2021-06-04 PROCEDURE — 99213 OFFICE O/P EST LOW 20 MIN: CPT | Mod: 25

## 2021-06-04 PROCEDURE — 20553 NJX 1/MLT TRIGGER POINTS 3/>: CPT

## 2021-06-04 NOTE — PROCEDURE
[FreeTextEntry3] : Intramuscular (non-articular, non-tendonous) Trigger point injections were explained to the patient, and potential complications discussed including pain from site locally, and rarely infection or bleeding from these local sites. \par The areas to be injected were cleaned with alcohol swabs and allowed to dry prior to injection.\par A sterile solution of 3:1.5:1.5 ratio normal saline:4%dexamethasone:2%lidocaine no epi was drawn.\par \par Lot #s: \par Lidocaine 2.5%/Prilocaine 2.5% topical NDC 13643-874-35, exp 10/21\par 0.9% saline: NDC 4226-8369-98 lot [id number] exp 10/21\par dexamethasone 20mg/5ml NDC 26545-370-63 Lot [id number] Exp 04/22\par bupivacaine/sensorcaine 0.5% NDC 10209-224-79 [id number] 02/23\par \par A total of 6 cc was injected in the following regions:\par 1) splenius capitis x 2 RL\par 2) semispinalis capitis RL\par 3) upper trapezius RL\par 4) Rt cervical paraspinal \par 5) Lt SCM\par 6) Rt sternocleidomastoid\par \par The patient tolerated the procedure without issues.\par

## 2021-06-04 NOTE — DISCUSSION/SUMMARY
[FreeTextEntry1] : Impression:\par 1) Sleep disorder - prior history of witnessed apneas.  Awakening feeling tired, and with excessive daytime somnolence.  Possible SUZY vs acquired narcolepsy.  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 Using indomethacin many nights but low dose, with some effect.\par 3) head/neck pain - improvement with trigger point inj\par 4) brain fog - improvements with tramadol.\par \par Plan:\par 1) Home sleep test.\par 2) continue trigger point injections which can be done today. \par

## 2021-06-04 NOTE — PHYSICAL EXAM
[FreeTextEntry1] : General:\par Constitutional:  Sitting comfortably in NAD.\par Psychiatric: well-groomed, appropriate affect\par Ears, Nose, Throat: no abnormalities, mucus membranes moist\par Neck: supple, large trigger point left upper trapezius\par Extremities: no edema, clubbing or cyanosis\par Skin: no rash or neuro-cutaneous signs \par \par Cognitive:\par Orientation, language, memory and knowledge screens intact.\par \par Cranial Nerves:\par II: SANTINO. III/IV/VI: EOM Full.  VII: Face appears symmetric VIII: Normal to screening\par IX/X: normal phonation  XI: Trapezius Symmetric  XII: Tongue midline\par Motor:\par Power: no pronator drift\par \par Narrow based gait\par

## 2021-06-04 NOTE — HISTORY OF PRESENT ILLNESS
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  He believes there was some improvement, particularly with tenderness in the posterior aspect of his head.\par \par He is continuing to use gabapentin, allegra and MM.\par He will take a 25mg of indocid if he awakens at 4am, which works great at the time.\par \par In the afternoon, there is extreme fatigue, which forces him to leave.\par Tramadol helps lift his mood, and he is a bit tired initially, but then he feels clear.\par Adderall can sometimes help\par Allegra in the afternoon does not do much.\par Wakix not helpful, made him feel ill.\par \par He does not awaken feeling rested.\par \par There was a history of sleep apnea - but his wife no longer notices him snoring or stopping his breathing.\par \par Prior:\par Had intermittent headaches since childhood.\par But about 6 years ago headaches became worse, sharp pain on the left convexity, left eye would become droopy, developed nasal congestion.   Rarely on the right.  The headache is constant for about 4 years.\par Had tried indomethacin, for a few days that helped.  But was worried about long-term use and stopped.  Even PRN it may help.\par Tried verapamil - 240mg CD qhs with some improvement.\par Gabapentin as well.\par Awakens not refreshed.\par Tried botox in 2018 w/ Dr Armstrong, but found it ineffective\par Feels he needs to lay down which may help.\par There is a sensation in the left side of his head, headache develops through the day, around 3-4pm, sometimes left and sometimes diffuse.  \par He reports having some myalgias, at the suboccipital region and forehead regions.\par \par Also c/o Brain fog.  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  Tested for sleep apnea - was positive but now negative.\par Insomnia note.\par \par Now off all neuro-medications.  Is taking CBD, sleeps through the night and awakens feeling a bit more rested.\par \par Has been seen by rheumatology - PET showed hyperactivity in the aorta and carotids.  Started on actemra.\par Low dose methotrexate and low dose prednisone.  Giant cell arteritis as a diagnosis, and symptoms resolved on PET (never had inflammatory markers).  Radiology now negative.\par \par Currently: Repatha, methotrexate 7.5 s/c, testosterone. \par nausea improving.\par This past week he has been feeling better.\par \par Mother may have the same symptoms - responded to antidepressant venlafaxine.\par

## 2021-07-15 ENCOUNTER — EMERGENCY (EMERGENCY)
Facility: HOSPITAL | Age: 66
LOS: 1 days | Discharge: HOME | End: 2021-07-15
Attending: EMERGENCY MEDICINE | Admitting: EMERGENCY MEDICINE
Payer: COMMERCIAL

## 2021-07-15 VITALS
OXYGEN SATURATION: 98 % | WEIGHT: 147.93 LBS | RESPIRATION RATE: 18 BRPM | SYSTOLIC BLOOD PRESSURE: 174 MMHG | HEART RATE: 78 BPM | TEMPERATURE: 98 F | DIASTOLIC BLOOD PRESSURE: 92 MMHG | HEIGHT: 68 IN

## 2021-07-15 DIAGNOSIS — Z88.6 ALLERGY STATUS TO ANALGESIC AGENT: ICD-10-CM

## 2021-07-15 DIAGNOSIS — R07.89 OTHER CHEST PAIN: ICD-10-CM

## 2021-07-15 DIAGNOSIS — Z86.79 PERSONAL HISTORY OF OTHER DISEASES OF THE CIRCULATORY SYSTEM: ICD-10-CM

## 2021-07-15 PROCEDURE — 84484 ASSAY OF TROPONIN QUANT: CPT

## 2021-07-15 PROCEDURE — 99285 EMERGENCY DEPT VISIT HI MDM: CPT | Mod: 25

## 2021-07-15 PROCEDURE — 71045 X-RAY EXAM CHEST 1 VIEW: CPT | Mod: 26

## 2021-07-15 PROCEDURE — 80053 COMPREHEN METABOLIC PANEL: CPT

## 2021-07-15 PROCEDURE — 93005 ELECTROCARDIOGRAM TRACING: CPT

## 2021-07-15 PROCEDURE — 93010 ELECTROCARDIOGRAM REPORT: CPT

## 2021-07-15 PROCEDURE — 99283 EMERGENCY DEPT VISIT LOW MDM: CPT | Mod: 25

## 2021-07-15 PROCEDURE — 86140 C-REACTIVE PROTEIN: CPT

## 2021-07-15 PROCEDURE — 36415 COLL VENOUS BLD VENIPUNCTURE: CPT

## 2021-07-15 PROCEDURE — 71045 X-RAY EXAM CHEST 1 VIEW: CPT

## 2021-07-15 PROCEDURE — 85025 COMPLETE CBC W/AUTO DIFF WBC: CPT

## 2021-07-15 RX ORDER — ASPIRIN/CALCIUM CARB/MAGNESIUM 324 MG
81 TABLET ORAL ONCE
Refills: 0 | Status: COMPLETED | OUTPATIENT
Start: 2021-07-15 | End: 2021-07-15

## 2021-07-15 RX ADMIN — Medication 81 MILLIGRAM(S): at 23:50

## 2021-07-15 NOTE — ED ADULT NURSE NOTE - OBJECTIVE STATEMENT
66y male c/o midsternal CP x 2 hrs. 66y male c/o midsternal CP x 2 hrs. pain is L sided and radiates down L arm. Pt pain started 2 hours ago while he was walking and has since resolved. pt reports slight lightheadedness. Alert and oriented x 4. Patient is awake and alert. Respirations even and unlabored. speaking in clear, full sentences. pt denies numbness, tingling, SOB, fever, chills, n/v/d, urinary sx. 18G L AC placed. labs sent. pt placed on ccm. ambulates in ED w/ steady gait.

## 2021-07-15 NOTE — ED PROVIDER NOTE - OBJECTIVE STATEMENT
The pt is a 65 y/o M, who presents to ED c/o cp PTA. Pt states that pain was L sided, lasted a few min and self resolved, initial episode while walking, second episode while driving, cp self resolved w/o any intervention, currently cp free, admits to feeling a bit sob but was anxious at the time, felt lightheaded. Had CTA coronaries in '20 and no obstructive dz/slightly elevated ca score. Denies palpitations, n/v/d, abd pain, syncope, fevers, chills.

## 2021-07-15 NOTE — ED ADULT TRIAGE NOTE - NS ED NURSE BANDS TYPE
Eboni, granddaughter, called to request Rx refills of the warfarin be called in to [facility] on [address].  [phone]    She said they called [facility] last week to request the refills but they don't have a record of us approving more refills.    Leona will run out this weekend.   Name band;

## 2021-07-15 NOTE — ED PROVIDER NOTE - NSFOLLOWUPINSTRUCTIONS_ED_ALL_ED_FT
please take an aspirin daily, follow up with your cardiologist tomorrow, return to ed for any worsening or concerning symptoms    Chest Pain    Chest pain can be caused by many different conditions which may or may not be dangerous. Causes include heartburn, lung infections, heart attack, blood clot in lungs, skin infections, strain or damage to muscle, cartilage, or bones, etc. In addition to a history and physical examination, an electrocardiogram (ECG) or other lab tests may have been performed to determine the cause of your chest pain. Follow up with your primary care provider or with a cardiologist as instructed.     SEEK IMMEDIATE MEDICAL CARE IF YOU HAVE ANY OF THE FOLLOWING SYMPTOMS: worsening chest pain, coughing up blood, unexplained back/neck/jaw pain, severe abdominal pain, dizziness or lightheadedness, fainting, shortness of breath, sweaty or clammy skin, vomiting, or racing heart beat. These symptoms may represent a serious problem that is an emergency. Do not wait to see if the symptoms will go away. Get medical help right away. Call 911 and do not drive yourself to the hospital.

## 2021-07-15 NOTE — ED PROVIDER NOTE - CARE PROVIDER_API CALL
Justin Burton (MD)  Cardiovascular Disease; Interventional Cardiology  [address] [place] [address]  Phone: [phone]  Fax: [phone]  Follow Up Time:

## 2021-07-15 NOTE — ED PROVIDER NOTE - PATIENT PORTAL LINK FT
You can access the FollowMyHealth Patient Portal offered by [facility] by registering at the following website: [email or web address]. By joining [employer]’s FollowMyHealth portal, you will also be able to view your health information using other applications (apps) compatible with our system.

## 2021-07-15 NOTE — ED PROVIDER NOTE - CLINICAL SUMMARY MEDICAL DECISION MAKING FREE TEXT BOX
pt c/o cp PTA - initial episode while walking then another episode while driving, cp free in ed, hx of giant cell arteritis, cta last yr w/o obstructive plaque, ekg nsr, cxr clear, trop neg - pt offered admission for cards tele for r/o but declined - agreed to 2nd trop (neg), discussed w/pt's card (dr rivera) and recommending plavix/statin/asa - pt only agreed to take asa, will f/u kervin, strict return precautions given

## 2021-07-15 NOTE — ED ADULT NURSE REASSESSMENT NOTE - NS ED NURSE REASSESS COMMENT FT1
2nd trop to be drawn @ midnight. as per Dr. Stephens, will crisis this lab. VSS as per flow sheet. pt resting comfortably in stretcher. appears well.

## 2021-07-15 NOTE — ED ADULT NURSE NOTE - NSIMPLEMENTINTERV_GEN_ALL_ED
Implemented All Universal Safety Interventions:  [place] to call system. Call bell, personal items and telephone within reach. Instruct patient to call for assistance. Room bathroom lighting operational. Non-slip footwear when patient is off stretcher. Physically safe environment: no spills, clutter or unnecessary equipment. Stretcher in lowest position, wheels locked, appropriate side rails in place.

## 2021-07-16 VITALS
RESPIRATION RATE: 18 BRPM | HEART RATE: 66 BPM | DIASTOLIC BLOOD PRESSURE: 74 MMHG | SYSTOLIC BLOOD PRESSURE: 155 MMHG | OXYGEN SATURATION: 97 %

## 2021-07-16 LAB
CRP SERPL-MCNC: 0.8 MG/L — SIGNIFICANT CHANGE UP (ref 0–4)
TROPONIN T SERPL-MCNC: 0.01 NG/ML — SIGNIFICANT CHANGE UP (ref 0–0.01)

## 2021-07-21 DIAGNOSIS — G90.9 DISORDER OF THE AUTONOMIC NERVOUS SYSTEM, UNSPECIFIED: ICD-10-CM

## 2021-07-23 ENCOUNTER — EMERGENCY (EMERGENCY)
Facility: HOSPITAL | Age: 66
LOS: 1 days | Discharge: HOME | End: 2021-07-23
Attending: EMERGENCY MEDICINE | Admitting: EMERGENCY MEDICINE
Payer: COMMERCIAL

## 2021-07-23 VITALS
SYSTOLIC BLOOD PRESSURE: 160 MMHG | RESPIRATION RATE: 18 BRPM | DIASTOLIC BLOOD PRESSURE: 80 MMHG | HEART RATE: 56 BPM | TEMPERATURE: 98 F | OXYGEN SATURATION: 97 % | HEIGHT: 68 IN

## 2021-07-23 DIAGNOSIS — Z88.8 ALLERGY STATUS TO OTHER DRUGS, MEDICAMENTS AND BIOLOGICAL SUBSTANCES: ICD-10-CM

## 2021-07-23 DIAGNOSIS — Z20.822 CONTACT WITH AND (SUSPECTED) EXPOSURE TO COVID-19: ICD-10-CM

## 2021-07-23 DIAGNOSIS — U07.1 COVID-19: ICD-10-CM

## 2021-07-23 LAB
ALBUMIN SERPL ELPH-MCNC: 4.4 G/DL — SIGNIFICANT CHANGE UP (ref 3.3–5)
ALP SERPL-CCNC: 90 U/L — SIGNIFICANT CHANGE UP (ref 40–120)
ALT FLD-CCNC: 19 U/L — SIGNIFICANT CHANGE UP (ref 10–45)
ANION GAP SERPL CALC-SCNC: 9 MMOL/L — SIGNIFICANT CHANGE UP (ref 5–17)
AST SERPL-CCNC: 21 U/L — SIGNIFICANT CHANGE UP (ref 10–40)
BASOPHILS # BLD AUTO: 0.01 K/UL — SIGNIFICANT CHANGE UP (ref 0–0.2)
BASOPHILS NFR BLD AUTO: 0.2 % — SIGNIFICANT CHANGE UP (ref 0–2)
BILIRUB SERPL-MCNC: 0.4 MG/DL — SIGNIFICANT CHANGE UP (ref 0.2–1.2)
BUN SERPL-MCNC: 21 MG/DL — SIGNIFICANT CHANGE UP (ref 7–23)
CALCIUM SERPL-MCNC: 9.8 MG/DL — SIGNIFICANT CHANGE UP (ref 8.4–10.5)
CHLORIDE SERPL-SCNC: 104 MMOL/L — SIGNIFICANT CHANGE UP (ref 96–108)
CHOLEST SERPL-MCNC: 147 MG/DL — SIGNIFICANT CHANGE UP
CO2 SERPL-SCNC: 27 MMOL/L — SIGNIFICANT CHANGE UP (ref 22–31)
CREAT SERPL-MCNC: 1.32 MG/DL — HIGH (ref 0.5–1.3)
CRP SERPL-MCNC: 3 MG/L — SIGNIFICANT CHANGE UP (ref 0–4)
D DIMER BLD IA.RAPID-MCNC: <150 NG/ML DDU — SIGNIFICANT CHANGE UP
EOSINOPHIL # BLD AUTO: 0 K/UL — SIGNIFICANT CHANGE UP (ref 0–0.5)
EOSINOPHIL NFR BLD AUTO: 0 % — SIGNIFICANT CHANGE UP (ref 0–6)
ERYTHROCYTE [SEDIMENTATION RATE] IN BLOOD: 0 MM/HR — SIGNIFICANT CHANGE UP
FERRITIN SERPL-MCNC: 83 NG/ML — SIGNIFICANT CHANGE UP (ref 30–400)
FIBRINOGEN PPP-MCNC: 266 MG/DL — SIGNIFICANT CHANGE UP (ref 258–438)
GLUCOSE SERPL-MCNC: 103 MG/DL — HIGH (ref 70–99)
HCT VFR BLD CALC: 49.3 % — SIGNIFICANT CHANGE UP (ref 39–50)
HDLC SERPL-MCNC: 82 MG/DL — SIGNIFICANT CHANGE UP
HGB BLD-MCNC: 15.4 G/DL — SIGNIFICANT CHANGE UP (ref 13–17)
IMM GRANULOCYTES NFR BLD AUTO: 0.6 % — SIGNIFICANT CHANGE UP (ref 0–1.5)
LIPID PNL WITH DIRECT LDL SERPL: 48 MG/DL — SIGNIFICANT CHANGE UP
LYMPHOCYTES # BLD AUTO: 1.24 K/UL — SIGNIFICANT CHANGE UP (ref 1–3.3)
LYMPHOCYTES # BLD AUTO: 19.5 % — SIGNIFICANT CHANGE UP (ref 13–44)
MCHC RBC-ENTMCNC: 28.1 PG — SIGNIFICANT CHANGE UP (ref 27–34)
MCHC RBC-ENTMCNC: 31.2 GM/DL — LOW (ref 32–36)
MCV RBC AUTO: 89.8 FL — SIGNIFICANT CHANGE UP (ref 80–100)
MONOCYTES # BLD AUTO: 0.7 K/UL — SIGNIFICANT CHANGE UP (ref 0–0.9)
MONOCYTES NFR BLD AUTO: 11 % — SIGNIFICANT CHANGE UP (ref 2–14)
NEUTROPHILS # BLD AUTO: 4.36 K/UL — SIGNIFICANT CHANGE UP (ref 1.8–7.4)
NEUTROPHILS NFR BLD AUTO: 68.7 % — SIGNIFICANT CHANGE UP (ref 43–77)
NON HDL CHOLESTEROL: 65 MG/DL — SIGNIFICANT CHANGE UP
NRBC # BLD: 0 /100 WBCS — SIGNIFICANT CHANGE UP (ref 0–0)
PLATELET # BLD AUTO: 122 K/UL — LOW (ref 150–400)
POTASSIUM SERPL-MCNC: 5 MMOL/L — SIGNIFICANT CHANGE UP (ref 3.5–5.3)
POTASSIUM SERPL-SCNC: 5 MMOL/L — SIGNIFICANT CHANGE UP (ref 3.5–5.3)
PROCALCITONIN SERPL-MCNC: 0.05 NG/ML — SIGNIFICANT CHANGE UP (ref 0.02–0.1)
PROT SERPL-MCNC: 6.8 G/DL — SIGNIFICANT CHANGE UP (ref 6–8.3)
RBC # BLD: 5.49 M/UL — SIGNIFICANT CHANGE UP (ref 4.2–5.8)
RBC # FLD: 15.2 % — HIGH (ref 10.3–14.5)
SARS-COV-2 RNA SPEC QL NAA+PROBE: DETECTED
SARS-COV-2 RNA SPEC QL NAA+PROBE: POSITIVE
SODIUM SERPL-SCNC: 140 MMOL/L — SIGNIFICANT CHANGE UP (ref 135–145)
TRIGL SERPL-MCNC: 87 MG/DL — SIGNIFICANT CHANGE UP
WBC # BLD: 6.35 K/UL — SIGNIFICANT CHANGE UP (ref 3.8–10.5)
WBC # FLD AUTO: 6.35 K/UL — SIGNIFICANT CHANGE UP (ref 3.8–10.5)

## 2021-07-23 PROCEDURE — 36415 COLL VENOUS BLD VENIPUNCTURE: CPT

## 2021-07-23 PROCEDURE — 84154 ASSAY OF PSA FREE: CPT

## 2021-07-23 PROCEDURE — 87635 SARS-COV-2 COVID-19 AMP PRB: CPT

## 2021-07-23 PROCEDURE — 85384 FIBRINOGEN ACTIVITY: CPT

## 2021-07-23 PROCEDURE — 84145 PROCALCITONIN (PCT): CPT

## 2021-07-23 PROCEDURE — 85379 FIBRIN DEGRADATION QUANT: CPT

## 2021-07-23 PROCEDURE — 99283 EMERGENCY DEPT VISIT LOW MDM: CPT

## 2021-07-23 PROCEDURE — 86140 C-REACTIVE PROTEIN: CPT

## 2021-07-23 PROCEDURE — 85025 COMPLETE CBC W/AUTO DIFF WBC: CPT

## 2021-07-23 PROCEDURE — 85652 RBC SED RATE AUTOMATED: CPT

## 2021-07-23 PROCEDURE — 80053 COMPREHEN METABOLIC PANEL: CPT

## 2021-07-23 PROCEDURE — U0005: CPT

## 2021-07-23 PROCEDURE — U0003: CPT

## 2021-07-23 PROCEDURE — 82652 VIT D 1 25-DIHYDROXY: CPT

## 2021-07-23 PROCEDURE — 83695 ASSAY OF LIPOPROTEIN(A): CPT

## 2021-07-23 PROCEDURE — 84153 ASSAY OF PSA TOTAL: CPT

## 2021-07-23 PROCEDURE — 80061 LIPID PANEL: CPT

## 2021-07-23 PROCEDURE — 82728 ASSAY OF FERRITIN: CPT

## 2021-07-23 NOTE — ED ADULT TRIAGE NOTE - BP NONINVASIVE DIASTOLIC (MM HG)
times per day  sodium chloride flush 0.9 % injection 10 mL, 10 mL, Intravenous, PRN  acetaminophen (TYLENOL) tablet 650 mg, 650 mg, Oral, Q4H PRN  Physical    VITALS:  BP (!) 164/69   Pulse 106   Temp 97.8 °F (36.6 °C) (Oral)   Resp 18   Ht 5' (1.524 m)   Wt 133 lb 4.8 oz (60.5 kg)   SpO2 100%   BMI 26.03 kg/m²   General appearance: No apparent distress appears stated age and cooperative. HEENT Normal cephalic, atraumatic without obvious deformity.  Pupils equal, round, and reactive to light.  Extra ocular muscles intact.  Conjunctivae/corneas clear. Neck: Supple, No jugular venous distention/bruits.  Trachea midline without thyromegaly or adenopathy with full range of motion. Lungs: Clear to auscultation, bilaterally without Rales/Wheezes/Rhonchi with good respiratory effort. Heart: Regular rate and rhythm with Normal S1/S2 without murmurs, rubs or gallops, point of maximum impulse non-displaced  Abdomen: Soft, non-tender or non-distended without rigidity or guarding and positive bowel sounds all four quadrants. Extremities: No clubbing, cyanosis, or edema bilaterally.  Full range of motion without deformity and normal gait intact. Skin: Skin color, texture, turgor normal.  No rashes or lesions. Neurologic: Alert and oriented X 3, neurovascularly intact with sensory/motor intact upper extremities/lower extremities, bilaterally.  Cranial nerves: II-XII intact, grossly non-focal.  Mental status: Alert, oriented, thought content appropriate.   Capillary Refill: Acceptable  < 3 seconds  Peripheral Pulses: +3 Easily felt, not easily obliterated with pressure     DATA:  CT Chest abdomen:  Bandlike and consolidative opacity in the lung bases may reflect atelectasis   or pneumonia.  There is no pleural effusion.  Background of emphysema.       No acute findings in the abdomen or pelvis.       Submucosal fat proliferation in the colon, which can be seen in setting of   obesity and chronic inflammatory bowel disease.  No pericolonic fat stranding   to suggest acute inflammation.         ASSESSMENT AND PLAN:       Acute hypoxic resp failure   Acute COPD exacerbation vs. HCAP  Cont nebs, steroids and abx  F/u blood cx, urine legionella and strep antigen      MARIS on CKD stage 3   Urine lytes ordered   Nephrology following      Hx of DVT  Cont eliquis      Muscle spams   Cont tizanidine      DVT Prophylaxis: eliquis  Diet: DIET GENERAL   Code Status: Full Code  PT/OT Eval Status: eval and treat 80

## 2021-07-23 NOTE — ED PROVIDER NOTE - PATIENT PORTAL LINK FT
You can access the FollowMyHealth Patient Portal offered by [facility] by registering at the following website: [email or web address]. By joining [employer]’s FollowMyHealth portal, you will also be able to view your health information using other applications (apps) compatible with our system.

## 2021-07-24 ENCOUNTER — EMERGENCY (EMERGENCY)
Facility: HOSPITAL | Age: 66
LOS: 1 days | Discharge: HOME | End: 2021-07-24
Attending: EMERGENCY MEDICINE | Admitting: EMERGENCY MEDICINE
Payer: COMMERCIAL

## 2021-07-24 ENCOUNTER — TRANSCRIPTION ENCOUNTER (OUTPATIENT)
Age: 66
End: 2021-07-24

## 2021-07-24 VITALS
SYSTOLIC BLOOD PRESSURE: 161 MMHG | TEMPERATURE: 98 F | HEART RATE: 78 BPM | OXYGEN SATURATION: 99 % | RESPIRATION RATE: 20 BRPM | DIASTOLIC BLOOD PRESSURE: 72 MMHG

## 2021-07-24 VITALS
HEART RATE: 72 BPM | OXYGEN SATURATION: 99 % | HEIGHT: 68 IN | SYSTOLIC BLOOD PRESSURE: 159 MMHG | TEMPERATURE: 98 F | DIASTOLIC BLOOD PRESSURE: 78 MMHG | RESPIRATION RATE: 18 BRPM

## 2021-07-24 LAB
COVID-19 SPIKE DOMAIN AB INTERP: POSITIVE
COVID-19 SPIKE DOMAIN ANTIBODY RESULT: 35.4 U/ML — HIGH
PSA FREE FLD-MCNC: 0.48 NG/ML — SIGNIFICANT CHANGE UP
PSA FREE FLD-MCNC: 28 % — SIGNIFICANT CHANGE UP
PSA SERPL-MCNC: 1.69 NG/ML — SIGNIFICANT CHANGE UP (ref 0–4)
SARS-COV-2 IGG+IGM SERPL QL IA: 35.4 U/ML — HIGH
SARS-COV-2 IGG+IGM SERPL QL IA: POSITIVE
VIT D25+D1,25 OH+D1,25 PNL SERPL-MCNC: 114 PG/ML — HIGH (ref 19.9–79.3)

## 2021-07-24 PROCEDURE — M0243: CPT | Mod: 52

## 2021-07-24 PROCEDURE — 99284 EMERGENCY DEPT VISIT MOD MDM: CPT | Mod: 25

## 2021-07-24 PROCEDURE — 93005 ELECTROCARDIOGRAM TRACING: CPT

## 2021-07-24 PROCEDURE — 71045 X-RAY EXAM CHEST 1 VIEW: CPT | Mod: 26

## 2021-07-24 PROCEDURE — 93010 ELECTROCARDIOGRAM REPORT: CPT

## 2021-07-24 PROCEDURE — 71045 X-RAY EXAM CHEST 1 VIEW: CPT

## 2021-07-24 PROCEDURE — 86769 SARS-COV-2 COVID-19 ANTIBODY: CPT

## 2021-07-24 PROCEDURE — 94640 AIRWAY INHALATION TREATMENT: CPT

## 2021-07-24 PROCEDURE — 36415 COLL VENOUS BLD VENIPUNCTURE: CPT

## 2021-07-24 RX ORDER — ALBUTEROL 90 UG/1
2 AEROSOL, METERED ORAL ONCE
Refills: 0 | Status: COMPLETED | OUTPATIENT
Start: 2021-07-24 | End: 2021-07-24

## 2021-07-24 RX ORDER — SODIUM CHLORIDE 9 MG/ML
250 INJECTION INTRAMUSCULAR; INTRAVENOUS; SUBCUTANEOUS
Refills: 0 | Status: COMPLETED | OUTPATIENT
Start: 2021-07-24 | End: 2021-07-24

## 2021-07-24 RX ORDER — ACETAMINOPHEN 500 MG
650 TABLET ORAL ONCE
Refills: 0 | Status: COMPLETED | OUTPATIENT
Start: 2021-07-24 | End: 2021-07-24

## 2021-07-24 RX ADMIN — Medication 100 MILLIGRAM(S): at 11:25

## 2021-07-24 RX ADMIN — ALBUTEROL 2 PUFF(S): 90 AEROSOL, METERED ORAL at 11:26

## 2021-07-24 RX ADMIN — SODIUM CHLORIDE 25 MILLILITER(S): 9 INJECTION INTRAMUSCULAR; INTRAVENOUS; SUBCUTANEOUS at 13:20

## 2021-07-24 RX ADMIN — Medication 650 MILLIGRAM(S): at 11:07

## 2021-07-24 RX ADMIN — SODIUM CHLORIDE 25 MILLILITER(S): 9 INJECTION INTRAMUSCULAR; INTRAVENOUS; SUBCUTANEOUS at 11:50

## 2021-07-24 NOTE — ED PROVIDER NOTE - NSFOLLOWUPINSTRUCTIONS_ED_ALL_ED_FT
COVID 19    You received MAB today; this may affect the efficacy of your vaccination.    We are concerned you may have COVID.  Rest.  Drink plenty of fluids.  Try over the counter medications based on your symptoms for relief; use as directed: sudafed or similar and/or nasal sprays for congestion, robitussin or similar for cough, tylenol for fever, body aches, pain.     Return for increased pain, increased sob, change in cough/sputum, increased fever, intractable vomiting, any other concerns.     ** Self-isolate for 10 days from the onset of your symptoms (extend your isolation if you are still having cough or fever at day 10 until you are cough and fever free for 24 hours)   Cough/sneeze into your elbow area.  Wash your hands after touching your face and avoid touching your face if able; if you are sharing spaces, clean surfaces after you touch them.

## 2021-07-24 NOTE — ED ADULT NURSE REASSESSMENT NOTE - NS ED NURSE REASSESS COMMENT FT1
MAB infusion complete, pt tolerated well, RN at bedside observing pt. Pt on cardiac monitor. C/O intermittent HA 5/10 MD Director aware.
Pt denies worsening SOB, hives, itching, HA, increased temp prior to infusion. Resting comfortably, RN at bedside.

## 2021-07-24 NOTE — ED PROVIDER NOTE - PATIENT PORTAL LINK FT
You can access the FollowMyHealth Patient Portal offered by [facility] by registering at the following website: [email or web address]. By joining [employer]’s FollowMyHealth portal, you will also be able to view your health information using other applications (apps) compatible with our system.

## 2021-07-24 NOTE — ED PROVIDER NOTE - OBJECTIVE STATEMENT
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  Pt returns today for MAB.  Pt notes sx onset w myalgias and fatigue, ha, cp that he attributed to his aortitis ~ 1 wk ago.  Pt notes ongoing cp w onset of cough 2 d ago (dry, no associated sob).  No fever.  Pt had n/v that resolved.  Pt notes feeling anxious.  Pt feels improved w tylenol - took 1 tab 325 mg this am at 815.  Pt started himself on an experimental drug regimen he's been investigating w ribivarin, hydroxychloroquin and nisonovic yest.

## 2021-07-24 NOTE — ED PROVIDER NOTE - CLINICAL SUMMARY MEDICAL DECISION MAKING FREE TEXT BOX
Pt w covid c/o cp, cough.  EKG unchanged, labs yest w nl ddimer, cxr, inflammatory markers.  Dr Spann and pt request spike Ab.  Pt ordered for MAB and supportive meds.

## 2021-07-24 NOTE — ED PROVIDER NOTE - CARE PROVIDER_API CALL
Stephanie Spann)  Critical Care Medicine; Pulmonary Disease  [address] [place] [address]  Phone: [phone]  Fax: [phone]  Follow Up Time:

## 2021-07-25 ENCOUNTER — INPATIENT (INPATIENT)
Facility: HOSPITAL | Age: 66
LOS: 0 days | Discharge: HOME | DRG: 308 | End: 2021-07-26
Attending: SPECIALIST/TECHNOLOGIST CARDIOVASCULAR | Admitting: SPECIALIST/TECHNOLOGIST CARDIOVASCULAR
Payer: COMMERCIAL

## 2021-07-25 VITALS
HEART RATE: 83 BPM | HEIGHT: 68 IN | WEIGHT: 147.05 LBS | RESPIRATION RATE: 18 BRPM | SYSTOLIC BLOOD PRESSURE: 136 MMHG | TEMPERATURE: 98 F | OXYGEN SATURATION: 97 % | DIASTOLIC BLOOD PRESSURE: 85 MMHG

## 2021-07-25 DIAGNOSIS — M31.6 OTHER GIANT CELL ARTERITIS: ICD-10-CM

## 2021-07-25 DIAGNOSIS — R00.2 PALPITATIONS: ICD-10-CM

## 2021-07-25 DIAGNOSIS — C95.90 LEUKEMIA, UNSPECIFIED NOT HAVING ACHIEVED REMISSION: ICD-10-CM

## 2021-07-25 DIAGNOSIS — U07.1 COVID-19: ICD-10-CM

## 2021-07-25 LAB
ALBUMIN SERPL ELPH-MCNC: 3.7 G/DL — SIGNIFICANT CHANGE UP (ref 3.3–5)
ALP SERPL-CCNC: 74 U/L — SIGNIFICANT CHANGE UP (ref 40–120)
ALT FLD-CCNC: 19 U/L — SIGNIFICANT CHANGE UP (ref 10–45)
ANION GAP SERPL CALC-SCNC: 11 MMOL/L — SIGNIFICANT CHANGE UP (ref 5–17)
AST SERPL-CCNC: 25 U/L — SIGNIFICANT CHANGE UP (ref 10–40)
BASOPHILS # BLD AUTO: 0.01 K/UL — SIGNIFICANT CHANGE UP (ref 0–0.2)
BASOPHILS NFR BLD AUTO: 0.2 % — SIGNIFICANT CHANGE UP (ref 0–2)
BILIRUB SERPL-MCNC: 0.4 MG/DL — SIGNIFICANT CHANGE UP (ref 0.2–1.2)
BUN SERPL-MCNC: 24 MG/DL — HIGH (ref 7–23)
CALCIUM SERPL-MCNC: 9.4 MG/DL — SIGNIFICANT CHANGE UP (ref 8.4–10.5)
CHLORIDE SERPL-SCNC: 106 MMOL/L — SIGNIFICANT CHANGE UP (ref 96–108)
CO2 SERPL-SCNC: 22 MMOL/L — SIGNIFICANT CHANGE UP (ref 22–31)
CREAT SERPL-MCNC: 1.51 MG/DL — HIGH (ref 0.5–1.3)
CRP SERPL-MCNC: 32.7 MG/L — HIGH (ref 0–4)
D DIMER BLD IA.RAPID-MCNC: 182 NG/ML DDU — SIGNIFICANT CHANGE UP
EOSINOPHIL # BLD AUTO: 0.11 K/UL — SIGNIFICANT CHANGE UP (ref 0–0.5)
EOSINOPHIL NFR BLD AUTO: 2 % — SIGNIFICANT CHANGE UP (ref 0–6)
GLUCOSE SERPL-MCNC: 129 MG/DL — HIGH (ref 70–99)
HCT VFR BLD CALC: 50.3 % — HIGH (ref 39–50)
HGB BLD-MCNC: 15.4 G/DL — SIGNIFICANT CHANGE UP (ref 13–17)
IMM GRANULOCYTES NFR BLD AUTO: 0.5 % — SIGNIFICANT CHANGE UP (ref 0–1.5)
LYMPHOCYTES # BLD AUTO: 2.66 K/UL — SIGNIFICANT CHANGE UP (ref 1–3.3)
LYMPHOCYTES # BLD AUTO: 47.3 % — HIGH (ref 13–44)
MCHC RBC-ENTMCNC: 27.4 PG — SIGNIFICANT CHANGE UP (ref 27–34)
MCHC RBC-ENTMCNC: 30.6 GM/DL — LOW (ref 32–36)
MCV RBC AUTO: 89.5 FL — SIGNIFICANT CHANGE UP (ref 80–100)
MONOCYTES # BLD AUTO: 0.63 K/UL — SIGNIFICANT CHANGE UP (ref 0–0.9)
MONOCYTES NFR BLD AUTO: 11.2 % — SIGNIFICANT CHANGE UP (ref 2–14)
NEUTROPHILS # BLD AUTO: 2.18 K/UL — SIGNIFICANT CHANGE UP (ref 1.8–7.4)
NEUTROPHILS NFR BLD AUTO: 38.8 % — LOW (ref 43–77)
NRBC # BLD: 0 /100 WBCS — SIGNIFICANT CHANGE UP (ref 0–0)
PLATELET # BLD AUTO: 137 K/UL — LOW (ref 150–400)
PROT SERPL-MCNC: 6.1 G/DL — SIGNIFICANT CHANGE UP (ref 6–8.3)
RBC # BLD: 5.62 M/UL — SIGNIFICANT CHANGE UP (ref 4.2–5.8)
RBC # FLD: 14.8 % — HIGH (ref 10.3–14.5)
SODIUM SERPL-SCNC: 139 MMOL/L — SIGNIFICANT CHANGE UP (ref 135–145)
TROPONIN T SERPL-MCNC: <0.01 NG/ML — SIGNIFICANT CHANGE UP (ref 0–0.01)
WBC # BLD: 5.62 K/UL — SIGNIFICANT CHANGE UP (ref 3.8–10.5)
WBC # FLD AUTO: 5.62 K/UL — SIGNIFICANT CHANGE UP (ref 3.8–10.5)

## 2021-07-25 PROCEDURE — 71045 X-RAY EXAM CHEST 1 VIEW: CPT | Mod: 26

## 2021-07-25 PROCEDURE — 99285 EMERGENCY DEPT VISIT HI MDM: CPT | Mod: 25

## 2021-07-25 PROCEDURE — 93010 ELECTROCARDIOGRAM REPORT: CPT

## 2021-07-25 RX ORDER — SODIUM CHLORIDE 9 MG/ML
1000 INJECTION INTRAMUSCULAR; INTRAVENOUS; SUBCUTANEOUS ONCE
Refills: 0 | Status: COMPLETED | OUTPATIENT
Start: 2021-07-25 | End: 2021-07-25

## 2021-07-25 NOTE — ED ADULT TRIAGE NOTE - BRAND OF COVID-19 VACCINATION
Patient was seen last week. Patient was put on an antibiotic for toe infection.  Patient has been nauseous since.  Patient has 3 days left of medication.    Patient is requesting to speak to the nurse.    Amanda Vigilan is requesting a refill of losartan (COZAAR) 50 MG tablet for a 90 day supply and would like sent to local pharmacy, [facility] [address].    Pfizer dose 1 and 2

## 2021-07-25 NOTE — ED ADULT NURSE NOTE - OBJECTIVE STATEMENT
67 yo M presents to ED co palpitations for around 30 seconds PTA associated with lightheadedness and nausea. Pt A&Ox4, ambulatory with steady gait, speaking in clear/full sentences, no acute distress, vital signs stable. EKG completed and CCM initiated.

## 2021-07-25 NOTE — ED ADULT NURSE NOTE - CHPI ED NUR SYMPTOMS NEG
no back pain/no chest pain/no congestion/no diaphoresis/no dizziness/no fever/no syncope/no vomiting

## 2021-07-25 NOTE — ED PROVIDER NOTE - CLINICAL SUMMARY MEDICAL DECISION MAKING FREE TEXT BOX
Pt c/o palpitations, near syncope just pta w nsr on ekg and monitor, no current sx.  ? transient arrhythmia, no cp but ? acs, ? pe (nl sat, hr), doubt anxiety.  Plan monitor, labs, consider cta if abnl ddimer, cxr, discuss w Dr Burton.  Likely admit for tele monitoring.  Reassess Pt c/o palpitations, near syncope just pta w nsr on ekg and monitor, no current sx.  ? transient arrhythmia, no cp but ? acs, ? pe (nl sat, hr), doubt anxiety.  Plan monitor, labs, consider cta if abnl ddimer, cxr, discuss w Dr Burton.  Consider admit for tele monitoring.  Reassess

## 2021-07-25 NOTE — ED PROVIDER NOTE - CONSTITUTIONAL, MLM
Appears fatigue, awake, alert, oriented to person, place, time/situation and in no apparent distress. normal... Appears fatigued, awake, alert, oriented to person, place, time/situation

## 2021-07-25 NOTE — ED PROVIDER NOTE - PATIENT PORTAL LINK FT
You can access the FollowMyHealth Patient Portal offered by [facility] by registering at the following website: [email or web address]. By joining [employer]’s FollowMyHealth portal, you will also be able to view your health information using other applications (apps) compatible with our system.

## 2021-07-25 NOTE — ED PROVIDER NOTE - NSFOLLOWUPINSTRUCTIONS_ED_ALL_ED_FT
Palpitations  COVID-19    Return for increased pain, difficulty breathing, palpitations, near-syncope, any other concerns.   Follow up as discussed to obtain a portable heart monitor.    Palpitations    A palpitation is the feeling that your heartbeat is irregular or is faster than normal. It may feel like your heart is fluttering or skipping a beat. They may be caused by many things, including smoking, caffeine, alcohol, stress, and certain medicines. Although most causes of palpitations are not serious, palpitations can be a sign of a serious medical problem. Avoid caffeine, alcohol, and tobacco products at home. Try to reduce stress and anxiety and make sure to get plenty of rest.     SEEK IMMEDIATE MEDICAL CARE IF YOU HAVE ANY OF THE FOLLOWING SYMPTOMS: chest pain, shortness of breath, severe headache, dizziness/lightheadedness, or fainting.

## 2021-07-25 NOTE — ED PROVIDER NOTE - CARE PROVIDER_API CALL
Justin Burton (MD)  Cardiovascular Disease; Interventional Cardiology  [address] [place] [address]  Phone: [phone]  Fax: [phone]  Follow Up Time:

## 2021-07-25 NOTE — ED PROVIDER NOTE - OBJECTIVE STATEMENT
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 Pt seen 7/23 and given MAB.  Pt returns c/o near syncope and palpitations x 30 s followed by another short episode of palpitations just pta.  No associated cp.  + h/o pvc's, no other arrhythmia.  Pt states he felt worse after MAB for a day and today had started to feel 50% better.  Pt c/o ha, myalgias, fatigue, cough, mild sob similar to prior.

## 2021-07-25 NOTE — ED PROVIDER NOTE - PROGRESS NOTE DETAILS
Pt returns today complaining of palpitations, SOB, near syncope tha5t occurred prior to arrival. Initial palpitations 30 sec followed by another episode shortly after. No current palpitation or SOB. Generalized fatigue s/p MAB infusion. 7:23 states felt worse w/ covid symptoms, increased headache, fatigue and body aches. Day after MAB felt 50% better until episode of palpitations. Hx of monofocal pvcs, no other erythremias. Pt w cr 1.5 - higher than prior but pt states cr usually 1.3-1.6 and refused ivf that were ordered.  Pt's other labs wnl including trop, ddimer.  CXR neg.  No reply from Dr Burton after mult attempts.  Discussed admitting pt for cardiac monitoring w pt - he prefers dc home and fu w Dr Burton in office on Mon and does not want to be admitted.  Pt c/o feeling v anxious and request anxiolytic - ativan ordered.  Pt pending 2nd troponin.   Pt signed out to Dr Solitario and ANTONINO Lay. Pt w cr 1.5 - higher than prior but pt states cr usually 1.3-1.6 and refused ivf that were ordered.  Pt's other labs wnl including trop, ddimer.  CXR neg.  No reply from Dr Burton after mult attempts.  Discussed admitting pt for cardiac monitoring w pt - he prefers dc home and fu w Dr Burton in office on Mon and does not want to be admitted.  Pt c/o feeling v anxious and request anxiolytic - ativan ordered.  Pt pending 2nd troponin. Pt feeling improved after ativan. 2nd trop neg.  Discussed my concerns for poss arrhythmia that would not affect trop and desire to keep pt for tele monitoring.  He understands risk for arrhythmia that could potentially be life threatening but prefers dc and states he will fu w Dr Patel and Dr Burton to obtain outpt cardiac monitor.  Patient desires to leave emergency department against medical advice, prior to completion of my desired evaluation and treatment plan.  Patient's mental status is normal, patient is fully alert and oriented x person, place and time.  Patient demonstrates clear reasoning capabilities and capacity to make this decision.  Patient fully understands the explained risks involved with this decision including worsening of medical condition, missed and or delayed diagnosis, permanent disability and death.  All alternative options given to patient and still desires discharge against medical advice from ED and will follow up as an outpatient.  Patient given the option to return to ED at any time to have further evaluation and treatment. Pt feeling improved after ativan. 2nd trop neg.  Discussed my concerns for poss arrhythmia that would not affect trop and desire to keep pt for tele monitoring.  Pt agrees to admission.  Discussed w cardiology fellow and PA.  Cardiology fellow instructs to admit to Dr Louie and will contact Dr Burotn later this am.

## 2021-07-26 ENCOUNTER — APPOINTMENT (OUTPATIENT)
Dept: NEUROLOGY | Facility: CLINIC | Age: 66
End: 2021-07-26

## 2021-07-26 ENCOUNTER — TRANSCRIPTION ENCOUNTER (OUTPATIENT)
Age: 66
End: 2021-07-26

## 2021-07-26 VITALS
OXYGEN SATURATION: 96 % | SYSTOLIC BLOOD PRESSURE: 126 MMHG | RESPIRATION RATE: 18 BRPM | DIASTOLIC BLOOD PRESSURE: 76 MMHG | HEART RATE: 80 BPM

## 2021-07-26 DIAGNOSIS — I79.1 AORTITIS IN DISEASES CLASSIFIED ELSEWHERE: ICD-10-CM

## 2021-07-26 DIAGNOSIS — U07.1 COVID-19: ICD-10-CM

## 2021-07-26 DIAGNOSIS — C95.90 LEUKEMIA, UNSPECIFIED NOT HAVING ACHIEVED REMISSION: ICD-10-CM

## 2021-07-26 DIAGNOSIS — R00.2 PALPITATIONS: ICD-10-CM

## 2021-07-26 DIAGNOSIS — Z79.811 LONG TERM (CURRENT) USE OF AROMATASE INHIBITORS: ICD-10-CM

## 2021-07-26 DIAGNOSIS — Z85.6 PERSONAL HISTORY OF LEUKEMIA: ICD-10-CM

## 2021-07-26 DIAGNOSIS — R76.0 RAISED ANTIBODY TITER: ICD-10-CM

## 2021-07-26 DIAGNOSIS — R05 COUGH: ICD-10-CM

## 2021-07-26 DIAGNOSIS — M31.6 OTHER GIANT CELL ARTERITIS: ICD-10-CM

## 2021-07-26 PROBLEM — I77.6 ARTERITIS, UNSPECIFIED: Chronic | Status: ACTIVE | Noted: 2021-07-24

## 2021-07-26 LAB
A1C WITH ESTIMATED AVERAGE GLUCOSE RESULT: 5.5 % — SIGNIFICANT CHANGE UP (ref 4–5.6)
ALBUMIN SERPL ELPH-MCNC: 3.7 G/DL — SIGNIFICANT CHANGE UP (ref 3.3–5)
ALP SERPL-CCNC: 64 U/L — SIGNIFICANT CHANGE UP (ref 40–120)
ALT FLD-CCNC: 21 U/L — SIGNIFICANT CHANGE UP (ref 10–45)
ANION GAP SERPL CALC-SCNC: 11 MMOL/L — SIGNIFICANT CHANGE UP (ref 5–17)
AST SERPL-CCNC: 27 U/L — SIGNIFICANT CHANGE UP (ref 10–40)
BASOPHILS # BLD AUTO: 0.03 K/UL — SIGNIFICANT CHANGE UP (ref 0–0.2)
BASOPHILS NFR BLD AUTO: 0.5 % — SIGNIFICANT CHANGE UP (ref 0–2)
BILIRUB SERPL-MCNC: 0.4 MG/DL — SIGNIFICANT CHANGE UP (ref 0.2–1.2)
BUN SERPL-MCNC: 26 MG/DL — HIGH (ref 7–23)
CALCIUM SERPL-MCNC: 9.4 MG/DL — SIGNIFICANT CHANGE UP (ref 8.4–10.5)
CHLORIDE SERPL-SCNC: 107 MMOL/L — SIGNIFICANT CHANGE UP (ref 96–108)
CHOLEST SERPL-MCNC: 112 MG/DL — SIGNIFICANT CHANGE UP
CK MB CFR SERPL CALC: 1.8 NG/ML — SIGNIFICANT CHANGE UP (ref 0–6.7)
CK SERPL-CCNC: 152 U/L — SIGNIFICANT CHANGE UP (ref 30–200)
CO2 SERPL-SCNC: 23 MMOL/L — SIGNIFICANT CHANGE UP (ref 22–31)
CREAT SERPL-MCNC: 1.42 MG/DL — HIGH (ref 0.5–1.3)
CRP SERPL-MCNC: 25.1 MG/L — HIGH (ref 0–4)
EOSINOPHIL # BLD AUTO: 0.12 K/UL — SIGNIFICANT CHANGE UP (ref 0–0.5)
EOSINOPHIL NFR BLD AUTO: 2.1 % — SIGNIFICANT CHANGE UP (ref 0–6)
ERYTHROCYTE [SEDIMENTATION RATE] IN BLOOD: 6 MM/HR — SIGNIFICANT CHANGE UP
ESTIMATED AVERAGE GLUCOSE: 111 MG/DL — SIGNIFICANT CHANGE UP (ref 68–114)
FERRITIN SERPL-MCNC: 120 NG/ML — SIGNIFICANT CHANGE UP (ref 30–400)
FERRITIN SERPL-MCNC: 121 NG/ML — SIGNIFICANT CHANGE UP (ref 30–400)
GLUCOSE SERPL-MCNC: 91 MG/DL — SIGNIFICANT CHANGE UP (ref 70–99)
HCT VFR BLD CALC: 50.1 % — HIGH (ref 39–50)
HCV AB S/CO SERPL IA: 0.04 S/CO — SIGNIFICANT CHANGE UP
HCV AB SERPL-IMP: SIGNIFICANT CHANGE UP
HDLC SERPL-MCNC: 63 MG/DL — SIGNIFICANT CHANGE UP
HGB BLD-MCNC: 15.3 G/DL — SIGNIFICANT CHANGE UP (ref 13–17)
IMM GRANULOCYTES NFR BLD AUTO: 0.2 % — SIGNIFICANT CHANGE UP (ref 0–1.5)
LDH SERPL L TO P-CCNC: 229 U/L — SIGNIFICANT CHANGE UP (ref 50–242)
LIDOCAIN SERPL-MCNC: 199.7 NMOL/L — HIGH
LIPID PNL WITH DIRECT LDL SERPL: 31 MG/DL — SIGNIFICANT CHANGE UP
LYMPHOCYTES # BLD AUTO: 3.28 K/UL — SIGNIFICANT CHANGE UP (ref 1–3.3)
LYMPHOCYTES # BLD AUTO: 57.3 % — HIGH (ref 13–44)
MAGNESIUM SERPL-MCNC: 2 MG/DL — SIGNIFICANT CHANGE UP (ref 1.6–2.6)
MCHC RBC-ENTMCNC: 27.4 PG — SIGNIFICANT CHANGE UP (ref 27–34)
MCHC RBC-ENTMCNC: 30.5 GM/DL — LOW (ref 32–36)
MCV RBC AUTO: 89.8 FL — SIGNIFICANT CHANGE UP (ref 80–100)
MONOCYTES # BLD AUTO: 0.61 K/UL — SIGNIFICANT CHANGE UP (ref 0–0.9)
MONOCYTES NFR BLD AUTO: 10.7 % — SIGNIFICANT CHANGE UP (ref 2–14)
NEUTROPHILS # BLD AUTO: 1.67 K/UL — LOW (ref 1.8–7.4)
NEUTROPHILS NFR BLD AUTO: 29.2 % — LOW (ref 43–77)
NON HDL CHOLESTEROL: 49 MG/DL — SIGNIFICANT CHANGE UP
NRBC # BLD: 0 /100 WBCS — SIGNIFICANT CHANGE UP (ref 0–0)
PLATELET # BLD AUTO: 124 K/UL — LOW (ref 150–400)
POTASSIUM SERPL-MCNC: 3.8 MMOL/L — SIGNIFICANT CHANGE UP (ref 3.5–5.3)
POTASSIUM SERPL-MCNC: 4.8 MMOL/L — SIGNIFICANT CHANGE UP (ref 3.5–5.3)
POTASSIUM SERPL-SCNC: 3.8 MMOL/L — SIGNIFICANT CHANGE UP (ref 3.5–5.3)
POTASSIUM SERPL-SCNC: 4.8 MMOL/L — SIGNIFICANT CHANGE UP (ref 3.5–5.3)
PROCALCITONIN SERPL-MCNC: 0.1 NG/ML — SIGNIFICANT CHANGE UP (ref 0.02–0.1)
PROT SERPL-MCNC: 6.4 G/DL — SIGNIFICANT CHANGE UP (ref 6–8.3)
RBC # BLD: 5.58 M/UL — SIGNIFICANT CHANGE UP (ref 4.2–5.8)
RBC # FLD: 14.7 % — HIGH (ref 10.3–14.5)
SARS-COV-2 RNA SPEC QL NAA+PROBE: POSITIVE
SODIUM SERPL-SCNC: 141 MMOL/L — SIGNIFICANT CHANGE UP (ref 135–145)
TRIGL SERPL-MCNC: 90 MG/DL — SIGNIFICANT CHANGE UP
TROPONIN T SERPL-MCNC: 0.01 NG/ML — SIGNIFICANT CHANGE UP (ref 0–0.01)
TROPONIN T SERPL-MCNC: <0.01 NG/ML — SIGNIFICANT CHANGE UP (ref 0–0.01)
TSH SERPL-MCNC: 3.75 UIU/ML — SIGNIFICANT CHANGE UP (ref 0.27–4.2)
WBC # BLD: 5.72 K/UL — SIGNIFICANT CHANGE UP (ref 3.8–10.5)
WBC # FLD AUTO: 5.72 K/UL — SIGNIFICANT CHANGE UP (ref 3.8–10.5)

## 2021-07-26 PROCEDURE — 99239 HOSP IP/OBS DSCHRG MGMT >30: CPT

## 2021-07-26 PROCEDURE — 83615 LACTATE (LD) (LDH) ENZYME: CPT

## 2021-07-26 PROCEDURE — 82728 ASSAY OF FERRITIN: CPT

## 2021-07-26 PROCEDURE — 83036 HEMOGLOBIN GLYCOSYLATED A1C: CPT

## 2021-07-26 PROCEDURE — 87635 SARS-COV-2 COVID-19 AMP PRB: CPT

## 2021-07-26 PROCEDURE — 93005 ELECTROCARDIOGRAM TRACING: CPT

## 2021-07-26 PROCEDURE — 85379 FIBRIN DEGRADATION QUANT: CPT

## 2021-07-26 PROCEDURE — 99285 EMERGENCY DEPT VISIT HI MDM: CPT | Mod: 25

## 2021-07-26 PROCEDURE — 84443 ASSAY THYROID STIM HORMONE: CPT

## 2021-07-26 PROCEDURE — 84484 ASSAY OF TROPONIN QUANT: CPT

## 2021-07-26 PROCEDURE — 82550 ASSAY OF CK (CPK): CPT

## 2021-07-26 PROCEDURE — 83735 ASSAY OF MAGNESIUM: CPT

## 2021-07-26 PROCEDURE — 71045 X-RAY EXAM CHEST 1 VIEW: CPT

## 2021-07-26 PROCEDURE — 36415 COLL VENOUS BLD VENIPUNCTURE: CPT

## 2021-07-26 PROCEDURE — 82553 CREATINE MB FRACTION: CPT

## 2021-07-26 PROCEDURE — 80053 COMPREHEN METABOLIC PANEL: CPT

## 2021-07-26 PROCEDURE — 80061 LIPID PANEL: CPT

## 2021-07-26 PROCEDURE — 85652 RBC SED RATE AUTOMATED: CPT

## 2021-07-26 PROCEDURE — 85025 COMPLETE CBC W/AUTO DIFF WBC: CPT

## 2021-07-26 PROCEDURE — 86803 HEPATITIS C AB TEST: CPT

## 2021-07-26 PROCEDURE — 86140 C-REACTIVE PROTEIN: CPT

## 2021-07-26 PROCEDURE — 84145 PROCALCITONIN (PCT): CPT

## 2021-07-26 RX ORDER — RIBAVIRIN 200 MG/1
200 TABLET, COATED ORAL
Refills: 0 | Status: DISCONTINUED | OUTPATIENT
Start: 2021-07-26 | End: 2021-07-26

## 2021-07-26 RX ORDER — FOLIC ACID 0.8 MG
1 TABLET ORAL DAILY
Refills: 0 | Status: DISCONTINUED | OUTPATIENT
Start: 2021-07-26 | End: 2021-07-26

## 2021-07-26 RX ORDER — HYDROXYCHLOROQUINE SULFATE 200 MG
200 TABLET ORAL
Refills: 0 | Status: DISCONTINUED | OUTPATIENT
Start: 2021-07-26 | End: 2021-07-26

## 2021-07-26 RX ORDER — POTASSIUM CHLORIDE 20 MEQ
20 PACKET (EA) ORAL ONCE
Refills: 0 | Status: DISCONTINUED | OUTPATIENT
Start: 2021-07-26 | End: 2021-07-26

## 2021-07-26 RX ORDER — SODIUM CHLORIDE 9 MG/ML
1000 INJECTION INTRAMUSCULAR; INTRAVENOUS; SUBCUTANEOUS ONCE
Refills: 0 | Status: COMPLETED | OUTPATIENT
Start: 2021-07-26 | End: 2021-07-26

## 2021-07-26 RX ADMIN — Medication 1 MILLIGRAM(S): at 01:58

## 2021-07-26 NOTE — DISCHARGE NOTE PROVIDER - NSDCCPCAREPLAN_GEN_ALL_CORE_FT
PRINCIPAL DISCHARGE DIAGNOSIS  Diagnosis: Palpitations  Assessment and Plan of Treatment: You came to the hospital with intermittent palpitations. Your cardiac enzymes were negative x 3. We recommend that you follow up with your Cardiologist Dr. Patel and Dr. Burton within 1 week for an Echocardiogram and to discuss the findings of your cardiac CTA.   If you experience any worsening chest pain, palpitations, dizziness, or shortness of breath, please go to the nearest emergency room.      SECONDARY DISCHARGE DIAGNOSES  Diagnosis: COVID-19  Assessment and Plan of Treatment: You were diagnosed with the new COVID-19 coronavirus infection via a nasal swab. The treatment for this infection is supportive care, which includes: rest, maintaining adequate oral intake of food and water, and taking acetaminophen/tylenol for fever. Please maintain a strict home quarantine for 14 days at home, and wear a surgical mask at all times when you need to be in close proximity with another human being. Take tylenol as needed, every 6 hours, with a maximum daily dose of 4,000 mg a day. Please visit your nearest urgent care or emergency department should you start to experience: severe shortness of breath, severe cough/wheezing/difficulty breathing, or fever >103 for 3 days. Please maintain a good healthy diet. If you have any questions, please call your primary care provider.

## 2021-07-26 NOTE — H&P ADULT - PROBLEM SELECTOR PLAN 2
Recently diagnosed with COVID-19 on 7/23/21, with body aches, fatigue, cough, SOB  - Received monoclonal antibody infusion on 7/24/21 and reports feeling 50% better  - procalcitonin: 0.10, Ferritin: 120, D-dimer: 182, CRP: 32.7  - Patient started himself on:  - Continue to trend inflammatory markers Recently diagnosed with COVID-19 on 7/23/21, with body aches, fatigue, cough, SOB  - Received monoclonal antibody infusion on 7/24/21 and reports feeling 50% better  - procalcitonin: 0.10, Ferritin: 120, D-dimer: 182, CRP: 32.7  - Continue home regimen: ribavirin 200 mg BID, hydroxychloroquine 200 mg BID and Niclocide 500 mg once daily   - Continue to trend inflammatory markers

## 2021-07-26 NOTE — DISCHARGE NOTE PROVIDER - CARE PROVIDER_API CALL
Checo Patel)  Cardiovascular Medicine  [address], [address]  [place] [address]  Phone: [phone]  Fax: [phone]  Follow Up Time: 1 week    Justin Burton)  Cardiovascular Disease; Interventional Cardiology  [address], [address] [place], [place] [address]  Phone: [phone]  Fax: [phone]  Follow Up Time: 1 week

## 2021-07-26 NOTE — H&P ADULT - NSHPREVIEWOFSYSTEMS_GEN_ALL_CORE
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 Pt seen 7/23 and given MAB.  Pt returns c/o near syncope and palpitations x 30 s followed by another short episode of palpitations just pta.  No associated cp.  + h/o pvc's, no other arrhythmia.  Pt states he felt worse after MAB for a day and today had started to feel 50% better.  Pt c/o ha, myalgias, fatigue, cough, mild sob similar to prior.    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  Pt returns today for MAB.  Pt notes sx onset w myalgias and fatigue, ha, cp that he attributed to his aortitis ~ 1 wk ago.  Pt notes ongoing cp w onset of cough 2 d ago (dry, no associated sob).  No fever.  Pt had n/v that resolved.  Pt notes feeling anxious.  Pt feels improved w tylenol - took 1 tab 325 mg this am at 815.  Pt started himself on an experimental drug regimen he's been investigating w ribivarin, hydroxychloroquin and nisonovic yest.    The pt is a 67 y/o M, who presents to ED c/o cp PTA. Pt states that pain was L sided, lasted a few min and self resolved, initial episode while walking, second episode while driving, cp self resolved w/o any intervention, currently cp free, admits to feeling a bit sob but was anxious at the time, felt lightheaded. Had CTA coronaries in '20 and no obstructive dz/slightly elevated ca score. Denies palpitations, n/v/d, abd pain, syncope, fevers, chills.

## 2021-07-26 NOTE — H&P ADULT - NSICDXPASTMEDICALHX_GEN_ALL_CORE_FT
PAST MEDICAL HISTORY:  Aortitis     Autoimmune disease     GCA (giant cell arteritis)     Leukemia

## 2021-07-26 NOTE — H&P ADULT - ASSESSMENT
LAD: Distal LAD 0.72 (2), Diag 0.63 (3)   The calcium score is moderate at 114 Agatston units, which is at the 54th percentile, adjusted for age, gender and race.  2.  Moderate stenosis of ostial D1.  3.  Non-obstructive disease in remaining coronary arteries.  4.  Trileaflet aortic valve.  Aortic root measures 4.2 x 4 X 3.9 (cusp to commissure)  5.  Based on the above findings, CT FFR is recommended.      CTA 07/01/2021: Calcium score 114 Agatston units, which is at the 54th percentile, adjusted for age, gender, and race. Moderate stenosis of ostial D1 (FFR+: 0.63). dLAD 0.72 (FFR +), Trileaflet aortic valve.  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 Patient was recently in [facility] ED on 07/15/2021 with complaints of two episodes of left sided chest pain that self-resolved without any intervention with associated SOB and lightheadedness Patient was found to be COVID-19 positive on 7/23/21, for which he felt a sudden onset of myalgias and cp related to cough. Patient started himself on experimental regimen of ribavirin, hydroxychloroquine and Niclocide. Patient states his COVID symptoms have improved since infusion. However, patient still experiencing headache, myalgias, fatigue, cough, and mild SOB. Patient currently CP free. Patient admitted to cardiology/telemetry for palpitations.

## 2021-07-26 NOTE — DISCHARGE NOTE PROVIDER - NSDCMRMEDTOKEN_GEN_ALL_CORE_FT
hydroxychloroquine 200 mg oral tablet: 1 tab(s) orally 2 times a day  l-methylfolate 15 mg oral capsule: 1 cap(s) orally once a day  methotrexate 25 mg/mL injectable solution: 7 milligram(s) injectable once a week  Niclocide 500 mg oral tablet, chewable: 1 tab(s) orally once a day  ribavirin 200 mg oral tablet: 1 tab(s) orally 2 times a day

## 2021-07-26 NOTE — DISCHARGE NOTE PROVIDER - CARE PROVIDERS DIRECT ADDRESSES
,DirectAddress_Unknown,[email or web address]

## 2021-07-26 NOTE — H&P ADULT - PROBLEM SELECTOR PLAN 4
F: IV NS 1000 mL bolus x 2  E: K > 4, Mg > 2  N: NPO after midnight   Dvt:   Dispo: Continue Methotrexate    F: IV NS 1000 mL bolus x 2  E: K > 4, Mg > 2  N: NPO after midnight, in case ischemic eval    Dispo: 7 [facility], pending clinical progression Continue Methotrexate    F: IV NS 1000 mL bolus x 2  E: K > 4, Mg > 2  N: NPO after midnight, in case ischemic eval  Dvt: SCDs  Dispo: 7 Lachman, pending clinical progression

## 2021-07-26 NOTE — DISCHARGE NOTE PROVIDER - NSDCFUSCHEDAPPT_GEN_ALL_CORE_FT
DAYDAY CHRISTENSEN ; 07/26/2021 ; [facility] [address]  DAYDAY CHRISTENSEN ; 07/26/2021 ; [facility] [address]

## 2021-07-26 NOTE — DISCHARGE NOTE PROVIDER - PROVIDER TOKENS
PROVIDER:[TOKEN:[[id number]],FOLLOWUP:[1 week]],PROVIDER:[TOKEN:[[id number]],FOLLOWUP:[1 week]]

## 2021-07-26 NOTE — DISCHARGE NOTE PROVIDER - HOSPITAL COURSE
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 Patient was recently in [facility] ED on 07/15/2021 with complaints of two episodes of left sided chest pain that self-resolved without any intervention with associated SOB and lightheadedness Patient was found to be COVID-19 positive on 7/23/21, for which he felt a sudden onset of myalgias and cp related to cough. Patient started himself on experimental regimen of ribavirin, hydroxychloroquine and Niclocide. Patient states his COVID symptoms have improved since infusion. However, patient still experiencing headache, myalgias, fatigue, cough, and mild SOB. Patient currently CP free. Patient denies orthopnea, LE edema,  n/v, fever, dizziness, diaphoresis, claudication or chills.     In the ED, VS: T: 98.5 F    BP: 136/85 mmhg    HR:  83  RR:  18  spO2: 97% on RA  Labs significant for: Platelets: 137, BUN/Cr: 24/1.51, CRP: 32.7, Troponin <0.01 x 2   CXR: No acute infiltrates or effusions. EKG: NSR @ 81 bpm, no acute ST-T wave changes   Patient received: IV NS 1000 ml bolus x 2 and Ativan 1 mg IV x 1.   Patient admitted to cardiology/telemetry for palpitations. 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 Patient was recently in [facility] ED on 07/15/2021 with complaints of two episodes of left sided chest pain that self-resolved without any intervention with associated SOB and lightheadedness Patient was found to be COVID-19 positive on 7/23/21, for which he felt a sudden onset of myalgias and cp related to cough. Patient started himself on experimental regimen of ribavirin, hydroxychloroquine and Niclocide. Patient states his COVID symptoms have improved since infusion. However, patient still experiencing headache, myalgias, fatigue, cough, and mild SOB. Patient currently CP free. Patient denies orthopnea, LE edema,  n/v, fever, dizziness, diaphoresis, claudication or chills.   In the ED, VS: T: 98.5 F, BP: 136/85 mmhg, HR: 83,  RR: 18, SpO2: 97% RA. Labs significant for: Platelets: 137, BUN/Cr: 24/1.51, CRP: 32.7, Troponin <0.01 x 2. CXR: No acute infiltrates or effusions. EKG: NSR @ 81 bpm, no acute ST-T wave changes. Patient received: IV NS 1000 ml bolus x 2 and Ativan 1 mg IV x 1. Patient admitted to cardiology/telemetry for palpitations. Pt remained CP/palpitations free, repeat Trop negative, EKG remained non ischemic and no events on telemetry monitoring. Pt stable for discharge as discussed w/ Dr. Louie, with plan for pt to follow up w/ his Cardiologist Dr. 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 Patient was recently in [facility] ED on 07/15/2021 with complaints of two episodes of left sided chest pain that self-resolved without any intervention with associated SOB and lightheadedness Patient was found to be COVID-19 positive on 7/23/21, for which he felt a sudden onset of myalgias and cp related to cough. Patient started himself on experimental regimen of ribavirin, hydroxychloroquine and Niclocide. Patient states his COVID symptoms have improved since infusion. However, patient still experiencing headache, myalgias, fatigue, cough, and mild SOB. Patient currently CP free. Patient denies orthopnea, LE edema,  n/v, fever, dizziness, diaphoresis, claudication or chills.   In the ED, VS: T: 98.5 F, BP: 136/85 mmhg, HR: 83,  RR: 18, SpO2: 97% RA. Labs significant for: Platelets: 137, BUN/Cr: 24/1.51, CRP: 32.7, Troponin <0.01 x 2. CXR: No acute infiltrates or effusions. EKG: NSR @ 81 bpm, no acute ST-T wave changes. Patient received: IV NS 1000 ml bolus x 2 and Ativan 1 mg IV x 1. Patient admitted to cardiology/telemetry for palpitations. Pt remained CP/palpitations free, repeat Trop negative, EKG remained non ischemic and no events on telemetry monitoring. Pt stable for discharge as discussed w/ Dr. Louie, with plan for pt to follow up w/ his Cardiologist Dr. Patel on Wednesday for outpt Echocardiogram and follow up with interventionalist Dr. Burton to review CCTA results. 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 Patient was recently in [facility] ED on 07/15/2021 with complaints of two episodes of left sided chest pain that self-resolved without any intervention with associated SOB and lightheadedness Patient was found to be COVID-19 positive on 7/23/21, for which he felt a sudden onset of myalgias and cp related to cough. Patient started himself on experimental regimen of ribavirin, hydroxychloroquine and Niclocide. Patient states his COVID symptoms have improved since infusion. However, patient still experiencing headache, myalgias, fatigue, cough, and mild SOB. Patient currently CP free. Patient denies orthopnea, LE edema,  n/v, fever, dizziness, diaphoresis, claudication or chills.   In the ED, VS: T: 98.5 F, BP: 136/85 mmhg, HR: 83,  RR: 18, SpO2: 97% RA. Labs significant for: Platelets: 137, BUN/Cr: 24/1.51, CRP: 32.7, Troponin <0.01 x 2. CXR: No acute infiltrates or effusions. EKG: NSR @ 81 bpm, no acute ST-T wave changes. Patient received: IV NS 1000 ml bolus x 2 and Ativan 1 mg IV x 1. Patient admitted to cardiology/telemetry for palpitations.  Pt remained CP/palpitations free, repeat Trop negative, EKG remained non ischemic and no events on telemetry monitoring. Pt refused further inpatient w/u as he wishes to get Echo and Holter monitor as an outpt. Pt stable for discharge as discussed w/ Dr. Louie, with plan for pt to follow up w/ his Cardiologist Dr. Patel on Wednesday for outpt Echo and Holter monitor and follow up with interventionalist Dr. Burton to review CCTA results.

## 2021-07-26 NOTE — H&P ADULT - PROBLEM SELECTOR PLAN 1
Patient c/o brief episode of near-syncope then 30 seconds of palpitations   - EKG: NSR @ 81 bpm, no acute ST-T wave changes. CXR: no infiltrates/effusions  - Troponin negative x 2, continue to trend  - Patient with CTA 07/01/2021: Calcium score 114 Agatston units, which is at the 54th percentile, adjusted for age, gender, and race. Moderate stenosis of ostial D1 (FFR+: 0.63). dLAD 0.72 (FFR +), Trileaflet aortic valve.   - Patient with elevated Cr 1.51, given IV NS 1000 ml bolus x 2 In ED  - Orthostatics  - Will discuss possible ischemic eval in AM  - ECHO ordered   - TSH  - Tele monitoring Patient c/o brief episode of near-syncope then 30 seconds of palpitations   - EKG: NSR @ 81 bpm, no acute ST-T wave changes. CXR: no infiltrates/effusions  - Troponin negative x 2, continue to trend  - Patient with CTA 07/01/2021: Calcium score 114 Agatston units, which is at the 54th percentile, adjusted for age, gender, and race. Moderate stenosis of ostial D1 (FFR+: 0.63). dLAD 0.72 (FFR +), Trileaflet aortic valve.   - Patient with elevated Cr 1.51, given IV NS 1000 ml bolus x 2 In ED  - Orthostatics ordered, f/u   - Will discuss possible ischemic eval in AM  - ECHO ordered   - f/u tsh, a1c, lipids   - Tele monitoring

## 2021-07-26 NOTE — H&P ADULT - HISTORY OF PRESENT ILLNESS
Pt is a 65 y/o M with PMHx of giant cell arteritis (on methotrexate), leukemia, COVID + (received monoclonal antibody infusion on 07/24/21), who presented to ED after feeling as if he were to pass out then experiencing palpitations for about 30 seconds followed by another short episode of palpitations prior to arrival. Patient admits to a history of PVCs. Patient was recently in [facility] ED on 07/15/2021 with complaints of two episodes of left sided chest pain that self-resolved without any intervention with associated SOB and lightheadness. Patient was found to be COVID-19 positive on 7/23/21, for which he felt a sudden onset of myalgias and cp related to cough. Patient started himself on experimental regimen of ribavirin, hydroxychloroquine and nisonovic yeast. Patient states his COVID symptoms have improved since infusion. However, patient still experiencing headache, myalgias, fatigue, cough, and mild SOB. Patient currently CP free. Patient denies orthopnea, LE edema,  n/v, fever, dizziness, diaphoresis, claudication or chills.     In the ED, VS: T: 98.5 F    BP: 136/85 mmhg    HR:  83  RR:  18  spO2: 97% on RA  Labs significant for: Platelets: 137, BUN/Cr: 24/1.51, CRP: 32.7, Troponin <0.01 x 2   CXR: No acute infiltrates or effusions. EKG: NSR @ 81 bpm, no acute ST-T wave changes   Patient received: IV NS 1000 ml bolus x 2 and Ativan 1 mg IV x 1.   Patient admitted to cardiology/telemetry for palpitations.  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 Patient was recently in [facility] ED on 07/15/2021 with complaints of two episodes of left sided chest pain that self-resolved without any intervention with associated SOB and lightheadedness Patient was found to be COVID-19 positive on 7/23/21, for which he felt a sudden onset of myalgias and cp related to cough. Patient started himself on experimental regimen of ribavirin, hydroxychloroquine and Niclocide. Patient states his COVID symptoms have improved since infusion. However, patient still experiencing headache, myalgias, fatigue, cough, and mild SOB. Patient currently CP free. Patient denies orthopnea, LE edema,  n/v, fever, dizziness, diaphoresis, claudication or chills.     In the ED, VS: T: 98.5 F    BP: 136/85 mmhg    HR:  83  RR:  18  spO2: 97% on RA  Labs significant for: Platelets: 137, BUN/Cr: 24/1.51, CRP: 32.7, Troponin <0.01 x 2   CXR: No acute infiltrates or effusions. EKG: NSR @ 81 bpm, no acute ST-T wave changes   Patient received: IV NS 1000 ml bolus x 2 and Ativan 1 mg IV x 1.   Patient admitted to cardiology/telemetry for palpitations.

## 2021-07-26 NOTE — H&P ADULT - PROBLEM SELECTOR PLAN 3
Hx of giant cell arteritis  - continue home meds: Hx of giant cell arteritis  - continue home meds: Methotrexate 7 mg inj with folic acid on 7/27/21

## 2021-07-29 ENCOUNTER — APPOINTMENT (OUTPATIENT)
Dept: INTERNAL MEDICINE | Facility: CLINIC | Age: 66
End: 2021-07-29
Payer: COMMERCIAL

## 2021-07-29 PROCEDURE — 96372 THER/PROPH/DIAG INJ SC/IM: CPT

## 2021-07-29 RX ORDER — CYANOCOBALAMIN 1000 UG/ML
1000 INJECTION INTRAMUSCULAR; SUBCUTANEOUS
Qty: 0 | Refills: 0 | Status: COMPLETED | OUTPATIENT
Start: 2021-07-29

## 2021-07-29 RX ADMIN — CYANOCOBALAMIN 0 MCG/ML: 1000 INJECTION, SOLUTION INTRAMUSCULAR at 00:00

## 2021-07-30 ENCOUNTER — APPOINTMENT (OUTPATIENT)
Dept: INTERNAL MEDICINE | Facility: CLINIC | Age: 66
End: 2021-07-30
Payer: COMMERCIAL

## 2021-07-30 PROCEDURE — 99212 OFFICE O/P EST SF 10 MIN: CPT

## 2021-08-06 ENCOUNTER — APPOINTMENT (OUTPATIENT)
Dept: INTERNAL MEDICINE | Facility: CLINIC | Age: 66
End: 2021-08-06
Payer: COMMERCIAL

## 2021-08-06 PROCEDURE — 96372 THER/PROPH/DIAG INJ SC/IM: CPT

## 2021-08-06 RX ORDER — CYANOCOBALAMIN 1000 UG/ML
1000 INJECTION INTRAMUSCULAR; SUBCUTANEOUS
Qty: 0 | Refills: 0 | Status: COMPLETED | OUTPATIENT
Start: 2021-08-06

## 2021-08-06 RX ADMIN — CYANOCOBALAMIN 0 MCG/ML: 1000 INJECTION, SOLUTION INTRAMUSCULAR at 00:00

## 2021-08-08 LAB
ALBUMIN SERPL ELPH-MCNC: 4.3 G/DL
ALP BLD-CCNC: 85 U/L
ALT SERPL-CCNC: 34 U/L
ANION GAP SERPL CALC-SCNC: 10 MMOL/L
AST SERPL-CCNC: 32 U/L
BASOPHILS # BLD AUTO: 0.02 K/UL
BASOPHILS NFR BLD AUTO: 0.4 %
BILIRUB SERPL-MCNC: 0.4 MG/DL
BUN SERPL-MCNC: 14 MG/DL
CALCIUM SERPL-MCNC: 9.6 MG/DL
CHLORIDE SERPL-SCNC: 107 MMOL/L
CO2 SERPL-SCNC: 25 MMOL/L
COVID-19 SPIKE DOMAIN ANTIBODY INTERPRETATION: POSITIVE
CREAT SERPL-MCNC: 1.23 MG/DL
CRP SERPL-MCNC: <3 MG/L
EOSINOPHIL # BLD AUTO: 0.03 K/UL
EOSINOPHIL NFR BLD AUTO: 0.5 %
ERYTHROCYTE [SEDIMENTATION RATE] IN BLOOD BY WESTERGREN METHOD: 13 MM/HR
GLUCOSE SERPL-MCNC: 87 MG/DL
HCT VFR BLD CALC: 49.1 %
HGB BLD-MCNC: 15.5 G/DL
IMM GRANULOCYTES NFR BLD AUTO: 0.5 %
LYMPHOCYTES # BLD AUTO: 1.75 K/UL
LYMPHOCYTES NFR BLD AUTO: 32 %
MAN DIFF?: NORMAL
MCHC RBC-ENTMCNC: 28.9 PG
MCHC RBC-ENTMCNC: 31.6 GM/DL
MCV RBC AUTO: 91.6 FL
MONOCYTES # BLD AUTO: 0.36 K/UL
MONOCYTES NFR BLD AUTO: 6.6 %
NEUTROPHILS # BLD AUTO: 3.28 K/UL
NEUTROPHILS NFR BLD AUTO: 60 %
PLATELET # BLD AUTO: 171 K/UL
POTASSIUM SERPL-SCNC: 4.5 MMOL/L
PROT SERPL-MCNC: 6.2 G/DL
RBC # BLD: 5.36 M/UL
RBC # FLD: 15 %
SARS-COV-2 AB SERPL IA-ACNC: >250 U/ML
SODIUM SERPL-SCNC: 141 MMOL/L
WBC # FLD AUTO: 5.47 K/UL

## 2021-08-09 LAB
TESTOST BND SERPL-MCNC: 5.9 PG/ML
TESTOSTERONE TOTAL S: 657 NG/DL

## 2021-08-20 LAB — SARS-COV-2 N GENE NPH QL NAA+PROBE: NOT DETECTED

## 2021-09-27 DIAGNOSIS — Z11.59 ENCOUNTER FOR SCREENING FOR OTHER VIRAL DISEASES: ICD-10-CM

## 2021-09-28 LAB
COVID-19 SPIKE DOMAIN ANTIBODY INTERPRETATION: POSITIVE
SARS-COV-2 AB SERPL IA-ACNC: >250 U/ML

## 2021-10-01 LAB
ALBUMIN SERPL ELPH-MCNC: 4.1 G/DL
ALP BLD-CCNC: 76 U/L
ALT SERPL-CCNC: 32 U/L
ANION GAP SERPL CALC-SCNC: 11 MMOL/L
AST SERPL-CCNC: 34 U/L
BILIRUB SERPL-MCNC: 0.6 MG/DL
BUN SERPL-MCNC: 20 MG/DL
CALCIUM SERPL-MCNC: 9.6 MG/DL
CHLORIDE SERPL-SCNC: 105 MMOL/L
CO2 SERPL-SCNC: 25 MMOL/L
CREAT SERPL-MCNC: 1.24 MG/DL
GLUCOSE SERPL-MCNC: 87 MG/DL
POTASSIUM SERPL-SCNC: 4.6 MMOL/L
PROT SERPL-MCNC: 5.8 G/DL
SODIUM SERPL-SCNC: 141 MMOL/L

## 2021-10-27 ENCOUNTER — MED ADMIN CHARGE (OUTPATIENT)
Age: 66
End: 2021-10-27

## 2021-10-27 DIAGNOSIS — Z23 ENCOUNTER FOR IMMUNIZATION: ICD-10-CM

## 2021-10-28 ENCOUNTER — APPOINTMENT (OUTPATIENT)
Dept: INTERNAL MEDICINE | Facility: CLINIC | Age: 66
End: 2021-10-28
Payer: COMMERCIAL

## 2021-10-28 DIAGNOSIS — Z01.812 ENCOUNTER FOR PREPROCEDURAL LABORATORY EXAMINATION: ICD-10-CM

## 2021-10-28 DIAGNOSIS — Z01.818 ENCOUNTER FOR OTHER PREPROCEDURAL EXAMINATION: ICD-10-CM

## 2021-10-28 PROCEDURE — 90686 IIV4 VACC NO PRSV 0.5 ML IM: CPT

## 2021-10-28 PROCEDURE — G0008: CPT

## 2021-12-02 LAB
25(OH)D3 SERPL-MCNC: 91.6 NG/ML
ALBUMIN SERPL ELPH-MCNC: 4.7 G/DL
ALP BLD-CCNC: 81 U/L
ALT SERPL-CCNC: 25 U/L
ANION GAP SERPL CALC-SCNC: 18 MMOL/L
AST SERPL-CCNC: 22 U/L
BASOPHILS # BLD AUTO: 0.01 K/UL
BASOPHILS NFR BLD AUTO: 0.1 %
BILIRUB SERPL-MCNC: 0.7 MG/DL
BUN SERPL-MCNC: 23 MG/DL
CALCIUM SERPL-MCNC: 9.6 MG/DL
CHLORIDE SERPL-SCNC: 103 MMOL/L
CHOLEST SERPL-MCNC: 185 MG/DL
CO2 SERPL-SCNC: 20 MMOL/L
COVID-19 NUCLEOCAPSID  GAM ANTIBODY INTERPRETATION: NEGATIVE
COVID-19 SPIKE DOMAIN ANTIBODY INTERPRETATION: POSITIVE
CREAT SERPL-MCNC: 1.23 MG/DL
CRP SERPL-MCNC: <3 MG/L
EOSINOPHIL # BLD AUTO: 0.01 K/UL
EOSINOPHIL NFR BLD AUTO: 0.1 %
ERYTHROCYTE [SEDIMENTATION RATE] IN BLOOD BY WESTERGREN METHOD: 4 MM/HR
ESTIMATED AVERAGE GLUCOSE: 111 MG/DL
GLUCOSE SERPL-MCNC: 111 MG/DL
HBA1C MFR BLD HPLC: 5.5 %
HCT VFR BLD CALC: 50.7 %
HDLC SERPL-MCNC: 80 MG/DL
HGB BLD-MCNC: 15.9 G/DL
IMM GRANULOCYTES NFR BLD AUTO: 0.3 %
LDLC SERPL CALC-MCNC: 88 MG/DL
LYMPHOCYTES # BLD AUTO: 1.44 K/UL
LYMPHOCYTES NFR BLD AUTO: 19.3 %
MAN DIFF?: NORMAL
MCHC RBC-ENTMCNC: 29.2 PG
MCHC RBC-ENTMCNC: 31.4 GM/DL
MCV RBC AUTO: 93 FL
MONOCYTES # BLD AUTO: 0.37 K/UL
MONOCYTES NFR BLD AUTO: 5 %
NEUTROPHILS # BLD AUTO: 5.6 K/UL
NEUTROPHILS NFR BLD AUTO: 75.2 %
NONHDLC SERPL-MCNC: 105 MG/DL
PLATELET # BLD AUTO: 167 K/UL
POTASSIUM SERPL-SCNC: 4.8 MMOL/L
PROT SERPL-MCNC: 6.7 G/DL
PSA FREE FLD-MCNC: 25 %
PSA FREE SERPL-MCNC: 0.49 NG/ML
PSA SERPL-MCNC: 1.94 NG/ML
RBC # BLD: 5.45 M/UL
RBC # FLD: 15 %
SARS-COV-2 AB SERPL IA-ACNC: >250 U/ML
SARS-COV-2 AB SERPL QL IA: 0.65 INDEX
SODIUM SERPL-SCNC: 141 MMOL/L
TESTOST BND SERPL-MCNC: 36.9 PG/ML
TESTOSTERONE TOTAL S: >1500 NG/DL
TRIGL SERPL-MCNC: 81 MG/DL
TSH SERPL-ACNC: 1.12 UIU/ML
WBC # FLD AUTO: 7.45 K/UL

## 2021-12-03 ENCOUNTER — NON-APPOINTMENT (OUTPATIENT)
Age: 66
End: 2021-12-03

## 2021-12-08 ENCOUNTER — APPOINTMENT (OUTPATIENT)
Dept: OTOLARYNGOLOGY | Facility: CLINIC | Age: 66
End: 2021-12-08
Payer: COMMERCIAL

## 2021-12-08 VITALS — OXYGEN SATURATION: 97 % | DIASTOLIC BLOOD PRESSURE: 73 MMHG | SYSTOLIC BLOOD PRESSURE: 132 MMHG | HEART RATE: 65 BPM

## 2021-12-08 VITALS
OXYGEN SATURATION: 98 % | TEMPERATURE: 98.2 F | DIASTOLIC BLOOD PRESSURE: 67 MMHG | SYSTOLIC BLOOD PRESSURE: 150 MMHG | HEART RATE: 83 BPM

## 2021-12-08 DIAGNOSIS — H61.22 IMPACTED CERUMEN, LEFT EAR: ICD-10-CM

## 2021-12-08 DIAGNOSIS — H93.12 TINNITUS, LEFT EAR: ICD-10-CM

## 2021-12-08 PROCEDURE — 99213 OFFICE O/P EST LOW 20 MIN: CPT | Mod: NC,25

## 2021-12-08 PROCEDURE — 31231 NASAL ENDOSCOPY DX: CPT | Mod: NC

## 2021-12-09 PROBLEM — H61.22 CERUMEN DEBRIS ON TYMPANIC MEMBRANE OF LEFT EAR: Status: ACTIVE | Noted: 2021-12-09

## 2021-12-09 PROBLEM — H93.12 CLICKING TINNITUS OF LEFT EAR: Status: ACTIVE | Noted: 2021-12-09

## 2021-12-09 NOTE — ASSESSMENT
[FreeTextEntry1] : scant debris removed from l tm\par 1 small hair left not in god position to remove\par felt improved but not completely resolved\par I recommended trying debrox drops and seeing if resolves\par He called back in the evening and said he was now hearing repetitive clicks in runs in the l ear\par exam - tm nl\par most c/w palatal myoclonus\par , mri and mra ordered\par discussed, internet reviewed\par he said he would try lorazepam later to see if made a difference\par spoke with Dr Ha, who recommended heating pad, botox if intractable\par followup when possible

## 2021-12-09 NOTE — HISTORY OF PRESENT ILLNESS
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 It is ever few secs, a few at a time. He has h/o giant cell arteritis. Denies pain or drainage.

## 2021-12-09 NOTE — PHYSICAL EXAM
[de-identified] : r normal; l scant cerumen and hair removed from tm with appigator foceps under microsocpe, with peoxide  [de-identified] : s/p uvpp

## 2021-12-13 ENCOUNTER — RESULT REVIEW (OUTPATIENT)
Age: 66
End: 2021-12-13

## 2021-12-13 ENCOUNTER — OUTPATIENT (OUTPATIENT)
Dept: OUTPATIENT SERVICES | Facility: HOSPITAL | Age: 66
LOS: 1 days | End: 2021-12-13
Payer: COMMERCIAL

## 2021-12-13 PROCEDURE — 70549 MR ANGIOGRAPH NECK W/O&W/DYE: CPT

## 2021-12-13 PROCEDURE — 70544 MR ANGIOGRAPHY HEAD W/O DYE: CPT

## 2021-12-13 PROCEDURE — 70553 MRI BRAIN STEM W/O & W/DYE: CPT | Mod: 26

## 2021-12-13 PROCEDURE — 70553 MRI BRAIN STEM W/O & W/DYE: CPT

## 2021-12-13 PROCEDURE — A9585: CPT

## 2021-12-13 PROCEDURE — 70544 MR ANGIOGRAPHY HEAD W/O DYE: CPT | Mod: 26,59

## 2021-12-13 PROCEDURE — 70549 MR ANGIOGRAPH NECK W/O&W/DYE: CPT | Mod: 26

## 2021-12-23 ENCOUNTER — EMERGENCY (EMERGENCY)
Facility: HOSPITAL | Age: 66
LOS: 1 days | Discharge: HOME | End: 2021-12-23
Attending: EMERGENCY MEDICINE | Admitting: EMERGENCY MEDICINE
Payer: COMMERCIAL

## 2021-12-23 VITALS
HEIGHT: 68 IN | SYSTOLIC BLOOD PRESSURE: 135 MMHG | HEART RATE: 55 BPM | DIASTOLIC BLOOD PRESSURE: 80 MMHG | TEMPERATURE: 98 F | RESPIRATION RATE: 16 BRPM | OXYGEN SATURATION: 96 %

## 2021-12-23 VITALS
HEART RATE: 55 BPM | OXYGEN SATURATION: 96 % | DIASTOLIC BLOOD PRESSURE: 80 MMHG | SYSTOLIC BLOOD PRESSURE: 135 MMHG | RESPIRATION RATE: 16 BRPM | TEMPERATURE: 98 F

## 2021-12-23 DIAGNOSIS — B34.9 VIRAL INFECTION, UNSPECIFIED: ICD-10-CM

## 2021-12-23 DIAGNOSIS — Z20.822 CONTACT WITH AND (SUSPECTED) EXPOSURE TO COVID-19: ICD-10-CM

## 2021-12-23 DIAGNOSIS — M79.10 MYALGIA, UNSPECIFIED SITE: ICD-10-CM

## 2021-12-23 LAB
ALBUMIN SERPL ELPH-MCNC: 4.1 G/DL — SIGNIFICANT CHANGE UP (ref 3.3–5)
ALP SERPL-CCNC: 80 U/L — SIGNIFICANT CHANGE UP (ref 40–120)
ALT FLD-CCNC: 43 U/L — SIGNIFICANT CHANGE UP (ref 10–45)
ANION GAP SERPL CALC-SCNC: 13 MMOL/L — SIGNIFICANT CHANGE UP (ref 5–17)
AST SERPL-CCNC: 28 U/L — SIGNIFICANT CHANGE UP (ref 10–40)
BASOPHILS # BLD AUTO: 0.01 K/UL — SIGNIFICANT CHANGE UP (ref 0–0.2)
BASOPHILS NFR BLD AUTO: 0.1 % — SIGNIFICANT CHANGE UP (ref 0–2)
BILIRUB SERPL-MCNC: 1.1 MG/DL — SIGNIFICANT CHANGE UP (ref 0.2–1.2)
BUN SERPL-MCNC: 30 MG/DL — HIGH (ref 7–23)
CALCIUM SERPL-MCNC: 9.4 MG/DL — SIGNIFICANT CHANGE UP (ref 8.4–10.5)
CHLORIDE SERPL-SCNC: 100 MMOL/L — SIGNIFICANT CHANGE UP (ref 96–108)
CO2 SERPL-SCNC: 22 MMOL/L — SIGNIFICANT CHANGE UP (ref 22–31)
CREAT SERPL-MCNC: 1.21 MG/DL — SIGNIFICANT CHANGE UP (ref 0.5–1.3)
CRP SERPL-MCNC: <3 MG/L — SIGNIFICANT CHANGE UP (ref 0–4)
D DIMER BLD IA.RAPID-MCNC: <150 NG/ML DDU — SIGNIFICANT CHANGE UP
EOSINOPHIL # BLD AUTO: 0.01 K/UL — SIGNIFICANT CHANGE UP (ref 0–0.5)
EOSINOPHIL NFR BLD AUTO: 0.1 % — SIGNIFICANT CHANGE UP (ref 0–6)
FERRITIN SERPL-MCNC: 72 NG/ML — SIGNIFICANT CHANGE UP (ref 30–400)
GLUCOSE SERPL-MCNC: 119 MG/DL — HIGH (ref 70–99)
HCT VFR BLD CALC: 49.3 % — SIGNIFICANT CHANGE UP (ref 39–50)
HGB BLD-MCNC: 16.3 G/DL — SIGNIFICANT CHANGE UP (ref 13–17)
IMM GRANULOCYTES NFR BLD AUTO: 0.6 % — SIGNIFICANT CHANGE UP (ref 0–1.5)
LACTATE SERPL-SCNC: 1.7 MMOL/L — SIGNIFICANT CHANGE UP (ref 0.5–2)
LYMPHOCYTES # BLD AUTO: 0.54 K/UL — LOW (ref 1–3.3)
LYMPHOCYTES # BLD AUTO: 7.5 % — LOW (ref 13–44)
MCHC RBC-ENTMCNC: 29.6 PG — SIGNIFICANT CHANGE UP (ref 27–34)
MCHC RBC-ENTMCNC: 33.1 GM/DL — SIGNIFICANT CHANGE UP (ref 32–36)
MCV RBC AUTO: 89.6 FL — SIGNIFICANT CHANGE UP (ref 80–100)
MONOCYTES # BLD AUTO: 0.25 K/UL — SIGNIFICANT CHANGE UP (ref 0–0.9)
MONOCYTES NFR BLD AUTO: 3.5 % — SIGNIFICANT CHANGE UP (ref 2–14)
NEUTROPHILS # BLD AUTO: 6.31 K/UL — SIGNIFICANT CHANGE UP (ref 1.8–7.4)
NEUTROPHILS NFR BLD AUTO: 88.2 % — HIGH (ref 43–77)
NRBC # BLD: 0 /100 WBCS — SIGNIFICANT CHANGE UP (ref 0–0)
PLATELET # BLD AUTO: 165 K/UL — SIGNIFICANT CHANGE UP (ref 150–400)
POTASSIUM SERPL-MCNC: 4.8 MMOL/L — SIGNIFICANT CHANGE UP (ref 3.5–5.3)
POTASSIUM SERPL-SCNC: 4.8 MMOL/L — SIGNIFICANT CHANGE UP (ref 3.5–5.3)
PROT SERPL-MCNC: 6.4 G/DL — SIGNIFICANT CHANGE UP (ref 6–8.3)
RAPID RVP RESULT: DETECTED
RBC # BLD: 5.5 M/UL — SIGNIFICANT CHANGE UP (ref 4.2–5.8)
RBC # FLD: 14 % — SIGNIFICANT CHANGE UP (ref 10.3–14.5)
RV+EV RNA SPEC QL NAA+PROBE: DETECTED
SARS-COV-2 RNA SPEC QL NAA+PROBE: NEGATIVE — SIGNIFICANT CHANGE UP
SARS-COV-2 RNA SPEC QL NAA+PROBE: SIGNIFICANT CHANGE UP
SODIUM SERPL-SCNC: 135 MMOL/L — SIGNIFICANT CHANGE UP (ref 135–145)
WBC # BLD: 7.16 K/UL — SIGNIFICANT CHANGE UP (ref 3.8–10.5)
WBC # FLD AUTO: 7.16 K/UL — SIGNIFICANT CHANGE UP (ref 3.8–10.5)

## 2021-12-23 PROCEDURE — 82728 ASSAY OF FERRITIN: CPT

## 2021-12-23 PROCEDURE — 86140 C-REACTIVE PROTEIN: CPT

## 2021-12-23 PROCEDURE — 36415 COLL VENOUS BLD VENIPUNCTURE: CPT

## 2021-12-23 PROCEDURE — 83605 ASSAY OF LACTIC ACID: CPT

## 2021-12-23 PROCEDURE — 87040 BLOOD CULTURE FOR BACTERIA: CPT

## 2021-12-23 PROCEDURE — 85025 COMPLETE CBC W/AUTO DIFF WBC: CPT

## 2021-12-23 PROCEDURE — 80053 COMPREHEN METABOLIC PANEL: CPT

## 2021-12-23 PROCEDURE — 99285 EMERGENCY DEPT VISIT HI MDM: CPT | Mod: 25

## 2021-12-23 PROCEDURE — 99284 EMERGENCY DEPT VISIT MOD MDM: CPT

## 2021-12-23 PROCEDURE — 85379 FIBRIN DEGRADATION QUANT: CPT

## 2021-12-23 PROCEDURE — 87635 SARS-COV-2 COVID-19 AMP PRB: CPT

## 2021-12-23 PROCEDURE — 0225U NFCT DS DNA&RNA 21 SARSCOV2: CPT

## 2021-12-23 NOTE — ED PROVIDER NOTE - CLINICAL SUMMARY MEDICAL DECISION MAKING FREE TEXT BOX
65 y/o M presents to ED with viral syndrome and positive outpatient covid test. Vital signs are stable. Exam is generally unremarkable. Plan to obtain labs, CXR, inflammatory markers, and reassess eligibility for MAB.

## 2021-12-23 NOTE — ED PROVIDER NOTE - NSICDXPASTMEDICALHX_GEN_ALL_CORE_FT
PAST MEDICAL HISTORY:  Aortitis     Autoimmune disease     GCA (giant cell arteritis)     Leukemia

## 2021-12-23 NOTE — ED PROVIDER NOTE - OBJECTIVE STATEMENT
67 y/o M with PMHx of immunodeficiency on Methotrexate presents to ED with positive covid test outpatients for myalgias, headache, cough, fever, and generalized fatigue for the past 3 days. Pt was referred to ED for evaluation and possible MAB treatment. Denies abdominal pain, vomiting, chest pain, or SOB.

## 2021-12-23 NOTE — ED PROVIDER NOTE - PATIENT PORTAL LINK FT
You can access the FollowMyHealth Patient Portal offered by  by registering at the following website: [email or web address] By joining [email or web address] FollowMyHealth portal, you will also be able to view your health information using other applications (apps) compatible with our system.

## 2021-12-23 NOTE — ED ADULT NURSE NOTE - SUICIDE SCREENING QUESTION 1
You are contagious for the next 24 hrs and can return to work/school 24 hrs after antibiotic started.  Wash hands frequently to prevent spread of germs.  Do not share utensils or drinks.   No kissing until symptoms resolve.  Throw away tooth brush in 24-48 hrs.  Push fluids, small amts more frequently throughout day, and minimum of 6-8 8 oz. cups/day.  Finish all of antibiotics to prevent resistance.  May mix ½ strength hydrogen peroxide with salt water to gargle to ease sore throat.  May use Ibuprofen 200-600 mg every 6-8 hrs as needed for pain.  May use Acetaminophen 325-650mg every 4-6 hrs as needed for pain or fever.  May take over the counter probiotic 2 hours after antibiotic twice a day and for 7 days after antibiotic is completed to restore good bacteria that is lost when taking an antibiotic  If taking any contraceptives, use another form of birth control for 3 weeks after antibiotic is completed and may have break through bleeding due to decreased effectiveness of birth control pill when taken with antibiotics      Follow up with primary care provider in 2-3 days if no improvement in symptoms or worsening sore throat despite treatment, or if fevers persist despite treatment. Go directly to ER for moderate to severe pain, drooling, or trouble breathing.  Patient Education     Pharyngitis: Strep (Confirmed)     You have had a positive test for strep throat. Strep throat is a contagious illness. It is spread by coughing, kissing or by touching others after touching your mouth or nose. Symptoms include throat pain which is worse with swallowing, aching all over, headache and fever. It is treated with antibiotic medication. This should help you start to feel better within 1-2 days.  Home care  · Rest at home. Drink plenty of fluids to avoid dehydration.  · No work or school for the first 2 days of taking the antibiotics. After this time, you will not be contagious. You can then return to school or work if you  are feeling better.   · The antibiotic medication must be taken for the full 10 days, even if you feel better. This is very important to ensure the infection is treated. It is also important to prevent drug-resistent organisms from developing. If you were given an antibiotic shot, no more antibiotics are needed.  · You may use acetaminophen (Tylenol) or ibuprofen (Motrin, Advil) to control pain or fever, unless another medicine was prescribed for this. (NOTE: If you have chronic liver or kidney disease or ever had a stomach ulcer or GI bleeding, talk with your doctor before using these medicines.)  · Throat lozenges or sprays (such as Chloraseptic) help reduce pain. Gargling with warm salt water will also reduce throat pain. Dissolve 1/2 teaspoon of salt in 1 glass of warm water. This may be useful just before meals.   · Soft foods are okay. Avoid salty or spicy foods.  Follow-up care  Follow up with your healthcare provider or our staff if you are not improving over the next week.  When to seek medical advice  Call your healthcare provider right away if any of these occur:  · Fever as directed by your doctor   · New or worsening ear pain, sinus pain, or headache  · Painful lumps in the back of neck  · Stiff neck  · Lymph nodes are getting larger or becoming soft in the middle  · Inability to swallow liquids, excessive drooling, or inability to open mouth wide due to throat pain  · Signs of dehydration (very dark urine or no urine, sunken eyes, dizziness)  · Trouble breathing or noisy breathing  · Muffled voice  · New rash  © 2765-9224 [employer]. [address] [place] [address], [place] [address]. All rights reserved. This information is not intended as a substitute for professional medical care. Always follow your healthcare professional's instructions.            No

## 2021-12-28 LAB
CULTURE RESULTS: SIGNIFICANT CHANGE UP
SPECIMEN SOURCE: SIGNIFICANT CHANGE UP

## 2022-01-03 ENCOUNTER — RX RENEWAL (OUTPATIENT)
Age: 67
End: 2022-01-03

## 2022-01-11 LAB
ALBUMIN SERPL ELPH-MCNC: 4.2 G/DL
ALP BLD-CCNC: 80 U/L
ALT SERPL-CCNC: 93 U/L
ANION GAP SERPL CALC-SCNC: 10 MMOL/L
AST SERPL-CCNC: 55 U/L
BASOPHILS # BLD AUTO: 0.01 K/UL
BASOPHILS NFR BLD AUTO: 0.2 %
BILIRUB SERPL-MCNC: 0.4 MG/DL
BUN SERPL-MCNC: 23 MG/DL
CALCIUM SERPL-MCNC: 9.7 MG/DL
CHLORIDE SERPL-SCNC: 105 MMOL/L
CHOLEST SERPL-MCNC: 156 MG/DL
CO2 SERPL-SCNC: 26 MMOL/L
CREAT SERPL-MCNC: 1.37 MG/DL
EOSINOPHIL # BLD AUTO: 0 K/UL
EOSINOPHIL NFR BLD AUTO: 0 %
ESTRADIOL SERPL-MCNC: 23 PG/ML
GLUCOSE SERPL-MCNC: 124 MG/DL
HCT VFR BLD CALC: 48.1 %
HDLC SERPL-MCNC: 75 MG/DL
HGB BLD-MCNC: 14.7 G/DL
IMM GRANULOCYTES NFR BLD AUTO: 0.4 %
LDLC SERPL CALC-MCNC: 64 MG/DL
LYMPHOCYTES # BLD AUTO: 0.65 K/UL
LYMPHOCYTES NFR BLD AUTO: 13.6 %
MAN DIFF?: NORMAL
MCHC RBC-ENTMCNC: 28.6 PG
MCHC RBC-ENTMCNC: 30.6 GM/DL
MCV RBC AUTO: 93.6 FL
MONOCYTES # BLD AUTO: 0.1 K/UL
MONOCYTES NFR BLD AUTO: 2.1 %
NEUTROPHILS # BLD AUTO: 4.01 K/UL
NEUTROPHILS NFR BLD AUTO: 83.7 %
NONHDLC SERPL-MCNC: 80 MG/DL
PLATELET # BLD AUTO: 149 K/UL
POTASSIUM SERPL-SCNC: 5.1 MMOL/L
PROT SERPL-MCNC: 5.8 G/DL
PSA SERPL-MCNC: 1.98 NG/ML
RBC # BLD: 5.14 M/UL
RBC # FLD: 14.6 %
SODIUM SERPL-SCNC: 141 MMOL/L
TESTOST FREE SERPL-MCNC: 13.6 PG/ML
TESTOST SERPL-MCNC: 1074 NG/DL
TRIGL SERPL-MCNC: 81 MG/DL
TSH SERPL-ACNC: 0.7 UIU/ML
WBC # FLD AUTO: 4.79 K/UL

## 2022-02-03 ENCOUNTER — APPOINTMENT (OUTPATIENT)
Dept: NEUROLOGY | Facility: CLINIC | Age: 67
End: 2022-02-03
Payer: COMMERCIAL

## 2022-02-03 DIAGNOSIS — G47.00 INSOMNIA, UNSPECIFIED: ICD-10-CM

## 2022-02-03 PROCEDURE — 99214 OFFICE O/P EST MOD 30 MIN: CPT | Mod: 95

## 2022-02-03 NOTE — HISTORY OF PRESENT ILLNESS
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  Taking it several times here and there, but makes him sleepy so only really helpful at night.\par \par Topiramate he is considering.\par \par Verapamil.\par \par \par Last visit we did trigger point injections.  He believes there was some improvement, particularly with tenderness in the posterior aspect of his head.\par \par He is continuing to use gabapentin, allegra and MM.\par He will take a 25mg of indocid if he awakens at 4am, which works great at the time.\par \par In the afternoon, there is extreme fatigue, which forces him to leave.\par Tramadol helps lift his mood, and he is a bit tired initially, but then he feels clear.\par Adderall can sometimes help\par Allegra in the afternoon does not do much.\par Wakix not helpful, made him feel ill.\par \par He does not awaken feeling rested.\par \par There was a history of sleep apnea - but his wife no longer notices him snoring or stopping his breathing.\par \par Prior:\par Had intermittent headaches since childhood.\par But about 6 years ago headaches became worse, sharp pain on the left convexity, left eye would become droopy, developed nasal congestion.   Rarely on the right.  The headache is constant for about 4 years.\par Had tried indomethacin, for a few days that helped.  But was worried about long-term use and stopped.  Even PRN it may help.\par Tried verapamil - 240mg CD qhs with some improvement.\par Gabapentin as well.\par Awakens not refreshed.\par Tried botox in 2018 w/ Dr Armstrong, but found it ineffective\par Feels he needs to lay down which may help.\par There is a sensation in the left side of his head, headache develops through the day, around 3-4pm, sometimes left and sometimes diffuse.  \par He reports having some myalgias, at the suboccipital region and forehead regions.\par \par Also c/o Brain fog.  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  Tested for sleep apnea - was positive but now negative.\par Insomnia note.\par \par Now off all neuro-medications.  Is taking CBD, sleeps through the night and awakens feeling a bit more rested.\par \par Has been seen by rheumatology - PET showed hyperactivity in the aorta and carotids.  Started on actemra.\par Low dose methotrexate and low dose prednisone.  Giant cell arteritis as a diagnosis, and symptoms resolved on PET (never had inflammatory markers).  Radiology now negative.\par \par Currently: Repatha, methotrexate 7.5 s/c, testosterone. \par nausea improving.\par This past week he has been feeling better.\par \par Mother may have the same symptoms - responded to antidepressant venlafaxine.\par

## 2022-02-03 NOTE — DISCUSSION/SUMMARY
[FreeTextEntry1] : Impression:\par 1) chronic daily headache - with significant nausea and anorexia, but also trigeminal symptoms, and a response to indocid which may put it in the category of both migraine and TAC, particularly hemicrania continua.  Indocid causes drowsiness, but even just regular nightly use may help limit symptoms as preventative.\par 2) pt interested in gammacore nVNS, so will send out his forms to specialty pharmacy for insurance coverage check.\par \par Plan:\par 1) indocid 50mg qhs\par 2) gammacore trial\par 3) topiramate 25m as next step. 
Yes

## 2022-02-03 NOTE — REASON FOR VISIT
[Home] : at home, [unfilled] , at the time of the visit. [Medical Office: ([facility])___] : at the medical office located in  [Verbal consent obtained from patient] : the patient, [unfilled] [Follow-Up: _____] : a [unfilled] follow-up visit

## 2022-02-09 ENCOUNTER — APPOINTMENT (OUTPATIENT)
Dept: OTOLARYNGOLOGY | Facility: CLINIC | Age: 67
End: 2022-02-09
Payer: COMMERCIAL

## 2022-02-09 DIAGNOSIS — R09.81 NASAL CONGESTION: ICD-10-CM

## 2022-02-09 DIAGNOSIS — J34.2 DEVIATED NASAL SEPTUM: ICD-10-CM

## 2022-02-09 PROCEDURE — 99213 OFFICE O/P EST LOW 20 MIN: CPT

## 2022-02-09 RX ORDER — TESTOSTERONE CYPIONATE 200 MG/ML
200 INJECTION, SOLUTION INTRAMUSCULAR
Qty: 2 | Refills: 0 | Status: COMPLETED | COMMUNITY
Start: 2019-01-07 | End: 2022-02-09

## 2022-02-09 RX ORDER — TESTOSTERONE CYPIONATE 200 MG/ML
200 INJECTION, SOLUTION INTRAMUSCULAR
Qty: 1 | Refills: 0 | Status: COMPLETED | COMMUNITY
Start: 2018-04-19 | End: 2022-02-09

## 2022-02-09 RX ORDER — MODAFINIL 100 MG/1
100 TABLET ORAL
Qty: 30 | Refills: 0 | Status: COMPLETED | COMMUNITY
Start: 2021-07-27 | End: 2022-02-09

## 2022-02-09 RX ORDER — SOLRIAMFETOL 75 MG/1
75 TABLET, FILM COATED ORAL
Qty: 30 | Refills: 2 | Status: COMPLETED | COMMUNITY
Start: 2021-06-04 | End: 2022-02-09

## 2022-02-09 RX ORDER — LAMOTRIGINE 25 MG/1
25 TABLET ORAL DAILY
Qty: 180 | Refills: 1 | Status: COMPLETED | COMMUNITY
Start: 2021-07-27 | End: 2022-02-09

## 2022-02-09 NOTE — PHYSICAL EXAM
[Normal] : no rashes [de-identified] : Acute spontaneous epistaxis Rt side, no hx of trauma.\par scoped and packed [de-identified] : Bleeding Rt side cauterized w silver nortite and packed

## 2022-02-09 NOTE — HISTORY OF PRESENT ILLNESS
[de-identified] : 67 years old male patient with history of  Status post Open SMR and Right sided Laser Assisted Control of Epistaxis .  Patient is present today in the office  for right  nasal debridement. No active right side nasal epistaxis. No damage or rupture of the vasculature area  in the nasal cavity.   Increased Right nasal crusting.  Bacitracin ointment was inserted into patient right nostril for lubrication  and moisturizing agent may serve to mitigate further epistaxis providing dryness is problematic.

## 2022-02-09 NOTE — PHYSICAL EXAM
[Normal] : no rashes [de-identified] : Acute spontaneous epistaxis Rt side, no hx of trauma.\par scoped and packed [de-identified] : Bleeding Rt side cauterized w silver nortite and packed

## 2022-02-09 NOTE — HISTORY OF PRESENT ILLNESS
[de-identified] : 67 years old male patient with history of  Status post Open SMR and Right sided Laser Assisted Control of Epistaxis .  Patient is present today in the office  for right  nasal debridement. No active right side nasal epistaxis. No damage or rupture of the vasculature area  in the nasal cavity.   Increased Right nasal crusting.  Bacitracin ointment was inserted into patient right nostril for lubrication  and moisturizing agent may serve to mitigate further epistaxis providing dryness is problematic.

## 2022-02-09 NOTE — PROCEDURE
[Debridement] : debridement  [Epistaxis] : evaluation of epistaxis [Nasal Septum Mucosa Bleeding] : no bleeding [Nasal Septum Mucosa Bleeding Right] : bleeding on the right [Bilateral] : bilateral debridement of the nasal cavity [Moderate] : moderate [Rt] : on the right [Removed] : which was removed [Cauterized w/Silver Nitrate] : the bleeding was not cauterized with silver nitrate [FreeTextEntry6] : \par  [FreeTextEntry2] : No active epistaxis

## 2022-02-11 DIAGNOSIS — Z11.59 ENCOUNTER FOR SCREENING FOR OTHER VIRAL DISEASES: ICD-10-CM

## 2022-02-14 LAB
ALBUMIN SERPL ELPH-MCNC: 4 G/DL
ALP BLD-CCNC: 58 U/L
ALT SERPL-CCNC: 26 U/L
AMYLASE/CREAT SERPL: 73 U/L
ANION GAP SERPL CALC-SCNC: 11 MMOL/L
AST SERPL-CCNC: 25 U/L
BASOPHILS # BLD AUTO: 0.02 K/UL
BASOPHILS NFR BLD AUTO: 0.3 %
BILIRUB SERPL-MCNC: 0.5 MG/DL
BUN SERPL-MCNC: 27 MG/DL
CALCIUM SERPL-MCNC: 9.3 MG/DL
CHLORIDE SERPL-SCNC: 106 MMOL/L
CHOLEST SERPL-MCNC: 161 MG/DL
CO2 SERPL-SCNC: 23 MMOL/L
COVID-19 SPIKE DOMAIN ANTIBODY INTERPRETATION: POSITIVE
CREAT SERPL-MCNC: 1.28 MG/DL
EOSINOPHIL # BLD AUTO: 0.02 K/UL
EOSINOPHIL NFR BLD AUTO: 0.3 %
ESTIMATED AVERAGE GLUCOSE: 117 MG/DL
ESTRADIOL SERPL-MCNC: 32 PG/ML
GLUCOSE SERPL-MCNC: 97 MG/DL
HBA1C MFR BLD HPLC: 5.7 %
HCT VFR BLD CALC: 47.1 %
HDLC SERPL-MCNC: 80 MG/DL
HGB BLD-MCNC: 14.7 G/DL
IMM GRANULOCYTES NFR BLD AUTO: 0.6 %
LDLC SERPL CALC-MCNC: 68 MG/DL
LPL SERPL-CCNC: 27 U/L
LYMPHOCYTES # BLD AUTO: 1.22 K/UL
LYMPHOCYTES NFR BLD AUTO: 17.9 %
MAN DIFF?: NORMAL
MCHC RBC-ENTMCNC: 28.8 PG
MCHC RBC-ENTMCNC: 31.2 GM/DL
MCV RBC AUTO: 92.2 FL
MONOCYTES # BLD AUTO: 0.35 K/UL
MONOCYTES NFR BLD AUTO: 5.1 %
NEUTROPHILS # BLD AUTO: 5.17 K/UL
NEUTROPHILS NFR BLD AUTO: 75.8 %
NONHDLC SERPL-MCNC: 81 MG/DL
PLATELET # BLD AUTO: 142 K/UL
POTASSIUM SERPL-SCNC: 4.9 MMOL/L
PROT SERPL-MCNC: 5.8 G/DL
PSA SERPL-MCNC: 1.93 NG/ML
RBC # BLD: 5.11 M/UL
RBC # FLD: 15.9 %
SARS-COV-2 AB SERPL IA-ACNC: >250 U/ML
SODIUM SERPL-SCNC: 140 MMOL/L
TESTOST FREE SERPL-MCNC: 18.4 PG/ML
TESTOST SERPL-MCNC: 1231 NG/DL
TRIGL SERPL-MCNC: 66 MG/DL
WBC # FLD AUTO: 6.82 K/UL

## 2022-02-15 LAB
APTT BLD: 33.9 SEC
CRP SERPL-MCNC: <3 MG/L
INR PPP: 0.9 RATIO
PT BLD: 10.8 SEC

## 2022-03-14 ENCOUNTER — APPOINTMENT (OUTPATIENT)
Dept: MRI IMAGING | Facility: HOSPITAL | Age: 67
End: 2022-03-14

## 2022-03-14 ENCOUNTER — OUTPATIENT (OUTPATIENT)
Dept: OUTPATIENT SERVICES | Facility: HOSPITAL | Age: 67
LOS: 1 days | End: 2022-03-14
Payer: COMMERCIAL

## 2022-03-14 LAB
ALBUMIN SERPL ELPH-MCNC: 4.1 G/DL
ALP BLD-CCNC: 63 U/L
ALT SERPL-CCNC: 14 U/L
AMYLASE/CREAT SERPL: 54 U/L
ANION GAP SERPL CALC-SCNC: 11 MMOL/L
AST SERPL-CCNC: 19 U/L
BILIRUB SERPL-MCNC: 0.6 MG/DL
BUN SERPL-MCNC: 17 MG/DL
CALCIUM SERPL-MCNC: 9.3 MG/DL
CHLORIDE SERPL-SCNC: 106 MMOL/L
CO2 SERPL-SCNC: 24 MMOL/L
CREAT SERPL-MCNC: 1.27 MG/DL
CRP SERPL-MCNC: <3 MG/L
EGFR: 62 ML/MIN/1.73M2
ERYTHROCYTE [SEDIMENTATION RATE] IN BLOOD BY WESTERGREN METHOD: 8 MM/HR
GLUCOSE SERPL-MCNC: 104 MG/DL
LPL SERPL-CCNC: 22 U/L
POTASSIUM SERPL-SCNC: 3.9 MMOL/L
PROT SERPL-MCNC: 5.8 G/DL
SODIUM SERPL-SCNC: 141 MMOL/L
UREA BREATH TEST QL: NEGATIVE

## 2022-03-14 PROCEDURE — 71555 MRI ANGIO CHEST W OR W/O DYE: CPT | Mod: 26

## 2022-03-14 PROCEDURE — 70548 MR ANGIOGRAPHY NECK W/DYE: CPT | Mod: 26

## 2022-03-14 PROCEDURE — 71555 MRI ANGIO CHEST W OR W/O DYE: CPT

## 2022-03-14 PROCEDURE — A9585: CPT

## 2022-03-14 PROCEDURE — 70548 MR ANGIOGRAPHY NECK W/DYE: CPT

## 2022-03-16 ENCOUNTER — APPOINTMENT (OUTPATIENT)
Dept: MRI IMAGING | Facility: CLINIC | Age: 67
End: 2022-03-16

## 2022-03-30 ENCOUNTER — RESULT REVIEW (OUTPATIENT)
Age: 67
End: 2022-03-30

## 2022-03-30 ENCOUNTER — RX RENEWAL (OUTPATIENT)
Age: 67
End: 2022-03-30

## 2022-03-30 ENCOUNTER — OUTPATIENT (OUTPATIENT)
Dept: OUTPATIENT SERVICES | Facility: HOSPITAL | Age: 67
LOS: 1 days | End: 2022-03-30
Payer: COMMERCIAL

## 2022-03-30 LAB — GLUCOSE BLDC GLUCOMTR-MCNC: 110 MG/DL — HIGH (ref 70–99)

## 2022-03-30 PROCEDURE — 82962 GLUCOSE BLOOD TEST: CPT

## 2022-03-30 PROCEDURE — A9552: CPT

## 2022-03-30 PROCEDURE — 78815 PET IMAGE W/CT SKULL-THIGH: CPT | Mod: 26,PS

## 2022-03-30 PROCEDURE — 78815 PET IMAGE W/CT SKULL-THIGH: CPT

## 2022-03-31 ENCOUNTER — LABORATORY RESULT (OUTPATIENT)
Age: 67
End: 2022-03-31

## 2022-03-31 ENCOUNTER — EMERGENCY (EMERGENCY)
Facility: HOSPITAL | Age: 67
LOS: 1 days | Discharge: HOME | End: 2022-03-31
Attending: EMERGENCY MEDICINE | Admitting: EMERGENCY MEDICINE
Payer: COMMERCIAL

## 2022-03-31 VITALS
TEMPERATURE: 98 F | RESPIRATION RATE: 16 BRPM | SYSTOLIC BLOOD PRESSURE: 156 MMHG | HEART RATE: 75 BPM | WEIGHT: 139.99 LBS | HEIGHT: 68 IN | OXYGEN SATURATION: 98 % | DIASTOLIC BLOOD PRESSURE: 79 MMHG

## 2022-03-31 DIAGNOSIS — H01.9 UNSPECIFIED INFLAMMATION OF EYELID: ICD-10-CM

## 2022-03-31 DIAGNOSIS — I77.6 ARTERITIS, UNSPECIFIED: ICD-10-CM

## 2022-03-31 DIAGNOSIS — H53.8 OTHER VISUAL DISTURBANCES: ICD-10-CM

## 2022-03-31 DIAGNOSIS — M35.9 SYSTEMIC INVOLVEMENT OF CONNECTIVE TISSUE, UNSPECIFIED: ICD-10-CM

## 2022-03-31 DIAGNOSIS — Z88.6 ALLERGY STATUS TO ANALGESIC AGENT: ICD-10-CM

## 2022-03-31 DIAGNOSIS — Z86.16 PERSONAL HISTORY OF COVID-19: ICD-10-CM

## 2022-03-31 DIAGNOSIS — Z86.79 PERSONAL HISTORY OF OTHER DISEASES OF THE CIRCULATORY SYSTEM: ICD-10-CM

## 2022-03-31 DIAGNOSIS — C91.Z0 OTHER LYMPHOID LEUKEMIA NOT HAVING ACHIEVED REMISSION: ICD-10-CM

## 2022-03-31 PROCEDURE — 99284 EMERGENCY DEPT VISIT MOD MDM: CPT | Mod: 25

## 2022-03-31 PROCEDURE — 99284 EMERGENCY DEPT VISIT MOD MDM: CPT

## 2022-03-31 PROCEDURE — 96374 THER/PROPH/DIAG INJ IV PUSH: CPT

## 2022-03-31 RX ADMIN — Medication 58 MILLIGRAM(S): at 14:23

## 2022-03-31 NOTE — ED PROVIDER NOTE - NS ED MD DISPO DISCHARGE CCDA
"Chief Complaint   Patient presents with     RECHECK     Azam removal and Download       Initial /60 (BP Location: Right arm, Patient Position: Sitting, Cuff Size: Adult Regular)  Pulse 80  Ht 1.585 m (5' 2.4\")  Wt 73.3 kg (161 lb 9.6 oz)  BMI 29.18 kg/m2 Estimated body mass index is 29.18 kg/(m^2) as calculated from the following:    Height as of this encounter: 1.585 m (5' 2.4\").    Weight as of this encounter: 73.3 kg (161 lb 9.6 oz).  Medication Reconciliation: complete  B/P and Pulse done manually  "
Patient/Caregiver provided printed discharge information.

## 2022-03-31 NOTE — ED PROVIDER NOTE - OBJECTIVE STATEMENT
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 Rheumatologist had tests done and based on results patient required high doses of steroids, so comes to the ED for administration. Patient without complaints otherwise, no chest pain, SOB.

## 2022-03-31 NOTE — ED PROVIDER NOTE - PATIENT PORTAL LINK FT
You can access the FollowMyHealth Patient Portal offered by [facility] by registering at the following website: [email or web address]. By joining [employer]’s FollowMyHealth portal, you will also be able to view your health information using other applications (apps) compatible with our system.

## 2022-03-31 NOTE — ED PROVIDER NOTE - CLINICAL SUMMARY MEDICAL DECISION MAKING FREE TEXT BOX
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 Given 1g of solumedrol and will arrange further w/u as necessary. Patient c/o blurriness in L eye and will further f/u with rheumatology for this.

## 2022-04-01 ENCOUNTER — APPOINTMENT (OUTPATIENT)
Age: 67
End: 2022-04-01

## 2022-04-01 ENCOUNTER — APPOINTMENT (OUTPATIENT)
Dept: ORTHOPEDIC SURGERY | Facility: CLINIC | Age: 67
End: 2022-04-01
Payer: COMMERCIAL

## 2022-04-01 ENCOUNTER — OUTPATIENT (OUTPATIENT)
Dept: OUTPATIENT SERVICES | Facility: HOSPITAL | Age: 67
LOS: 1 days | End: 2022-04-01
Payer: COMMERCIAL

## 2022-04-01 VITALS — HEIGHT: 68 IN | BODY MASS INDEX: 23.04 KG/M2 | WEIGHT: 152 LBS | RESPIRATION RATE: 16 BRPM

## 2022-04-01 VITALS
RESPIRATION RATE: 17 BRPM | SYSTOLIC BLOOD PRESSURE: 152 MMHG | WEIGHT: 141.98 LBS | TEMPERATURE: 97 F | DIASTOLIC BLOOD PRESSURE: 78 MMHG | HEIGHT: 68 IN | HEART RATE: 72 BPM | OXYGEN SATURATION: 99 %

## 2022-04-01 DIAGNOSIS — M19.031 PRIMARY OSTEOARTHRITIS, RIGHT WRIST: ICD-10-CM

## 2022-04-01 DIAGNOSIS — L95.9 VASCULITIS LIMITED TO THE SKIN, UNSPECIFIED: ICD-10-CM

## 2022-04-01 PROCEDURE — 20600 DRAIN/INJ JOINT/BURSA W/O US: CPT | Mod: F5

## 2022-04-01 PROCEDURE — 73110 X-RAY EXAM OF WRIST: CPT | Mod: 50

## 2022-04-01 PROCEDURE — 99213 OFFICE O/P EST LOW 20 MIN: CPT | Mod: 25

## 2022-04-01 PROCEDURE — 96365 THER/PROPH/DIAG IV INF INIT: CPT

## 2022-04-01 RX ADMIN — Medication 1000 MILLIGRAM(S): at 12:40

## 2022-04-01 RX ADMIN — Medication 258 MILLIGRAM(S): at 11:42

## 2022-04-03 ENCOUNTER — EMERGENCY (EMERGENCY)
Facility: HOSPITAL | Age: 67
LOS: 1 days | Discharge: HOME | End: 2022-04-03
Attending: EMERGENCY MEDICINE | Admitting: EMERGENCY MEDICINE
Payer: COMMERCIAL

## 2022-04-03 VITALS
HEART RATE: 78 BPM | DIASTOLIC BLOOD PRESSURE: 78 MMHG | RESPIRATION RATE: 18 BRPM | TEMPERATURE: 98 F | SYSTOLIC BLOOD PRESSURE: 172 MMHG | OXYGEN SATURATION: 99 % | HEIGHT: 68 IN

## 2022-04-03 DIAGNOSIS — R51.9 HEADACHE, UNSPECIFIED: ICD-10-CM

## 2022-04-03 DIAGNOSIS — H53.8 OTHER VISUAL DISTURBANCES: ICD-10-CM

## 2022-04-03 DIAGNOSIS — R11.0 NAUSEA: ICD-10-CM

## 2022-04-03 DIAGNOSIS — H57.12 OCULAR PAIN, LEFT EYE: ICD-10-CM

## 2022-04-03 DIAGNOSIS — C91.Z0 OTHER LYMPHOID LEUKEMIA NOT HAVING ACHIEVED REMISSION: ICD-10-CM

## 2022-04-03 DIAGNOSIS — Z88.6 ALLERGY STATUS TO ANALGESIC AGENT: ICD-10-CM

## 2022-04-03 DIAGNOSIS — M31.6 OTHER GIANT CELL ARTERITIS: ICD-10-CM

## 2022-04-03 LAB
ALBUMIN SERPL ELPH-MCNC: 3.8 G/DL — SIGNIFICANT CHANGE UP (ref 3.3–5)
ALP SERPL-CCNC: 66 U/L — SIGNIFICANT CHANGE UP (ref 40–120)
ALT FLD-CCNC: 31 U/L — SIGNIFICANT CHANGE UP (ref 10–45)
ANION GAP SERPL CALC-SCNC: 8 MMOL/L — SIGNIFICANT CHANGE UP (ref 5–17)
AST SERPL-CCNC: 23 U/L — SIGNIFICANT CHANGE UP (ref 10–40)
BASOPHILS # BLD AUTO: 0.02 K/UL — SIGNIFICANT CHANGE UP (ref 0–0.2)
BASOPHILS NFR BLD AUTO: 0.2 % — SIGNIFICANT CHANGE UP (ref 0–2)
BILIRUB SERPL-MCNC: 0.4 MG/DL — SIGNIFICANT CHANGE UP (ref 0.2–1.2)
BUN SERPL-MCNC: 31 MG/DL — HIGH (ref 7–23)
CALCIUM SERPL-MCNC: 8.7 MG/DL — SIGNIFICANT CHANGE UP (ref 8.4–10.5)
CHLORIDE SERPL-SCNC: 101 MMOL/L — SIGNIFICANT CHANGE UP (ref 96–108)
CO2 SERPL-SCNC: 26 MMOL/L — SIGNIFICANT CHANGE UP (ref 22–31)
CREAT SERPL-MCNC: 1.1 MG/DL — SIGNIFICANT CHANGE UP (ref 0.5–1.3)
EGFR: 73 ML/MIN/1.73M2 — SIGNIFICANT CHANGE UP
EOSINOPHIL # BLD AUTO: 0 K/UL — SIGNIFICANT CHANGE UP (ref 0–0.5)
EOSINOPHIL NFR BLD AUTO: 0 % — SIGNIFICANT CHANGE UP (ref 0–6)
GLUCOSE SERPL-MCNC: 108 MG/DL — HIGH (ref 70–99)
HCT VFR BLD CALC: 45.8 % — SIGNIFICANT CHANGE UP (ref 39–50)
HGB BLD-MCNC: 15.1 G/DL — SIGNIFICANT CHANGE UP (ref 13–17)
IMM GRANULOCYTES NFR BLD AUTO: 1.4 % — SIGNIFICANT CHANGE UP (ref 0–1.5)
LYMPHOCYTES # BLD AUTO: 1.63 K/UL — SIGNIFICANT CHANGE UP (ref 1–3.3)
LYMPHOCYTES # BLD AUTO: 18.7 % — SIGNIFICANT CHANGE UP (ref 13–44)
MCHC RBC-ENTMCNC: 29.4 PG — SIGNIFICANT CHANGE UP (ref 27–34)
MCHC RBC-ENTMCNC: 33 GM/DL — SIGNIFICANT CHANGE UP (ref 32–36)
MCV RBC AUTO: 89.3 FL — SIGNIFICANT CHANGE UP (ref 80–100)
MONOCYTES # BLD AUTO: 0.64 K/UL — SIGNIFICANT CHANGE UP (ref 0–0.9)
MONOCYTES NFR BLD AUTO: 7.3 % — SIGNIFICANT CHANGE UP (ref 2–14)
NEUTROPHILS # BLD AUTO: 6.32 K/UL — SIGNIFICANT CHANGE UP (ref 1.8–7.4)
NEUTROPHILS NFR BLD AUTO: 72.4 % — SIGNIFICANT CHANGE UP (ref 43–77)
NRBC # BLD: 0 /100 WBCS — SIGNIFICANT CHANGE UP (ref 0–0)
PLATELET # BLD AUTO: 140 K/UL — LOW (ref 150–400)
POTASSIUM SERPL-MCNC: 4.4 MMOL/L — SIGNIFICANT CHANGE UP (ref 3.5–5.3)
POTASSIUM SERPL-SCNC: 4.4 MMOL/L — SIGNIFICANT CHANGE UP (ref 3.5–5.3)
PROT SERPL-MCNC: 5.6 G/DL — LOW (ref 6–8.3)
RBC # BLD: 5.13 M/UL — SIGNIFICANT CHANGE UP (ref 4.2–5.8)
RBC # FLD: 14.1 % — SIGNIFICANT CHANGE UP (ref 10.3–14.5)
SODIUM SERPL-SCNC: 135 MMOL/L — SIGNIFICANT CHANGE UP (ref 135–145)
WBC # BLD: 8.73 K/UL — SIGNIFICANT CHANGE UP (ref 3.8–10.5)
WBC # FLD AUTO: 8.73 K/UL — SIGNIFICANT CHANGE UP (ref 3.8–10.5)

## 2022-04-03 PROCEDURE — 99285 EMERGENCY DEPT VISIT HI MDM: CPT | Mod: 25

## 2022-04-03 PROCEDURE — 85025 COMPLETE CBC W/AUTO DIFF WBC: CPT

## 2022-04-03 PROCEDURE — 96374 THER/PROPH/DIAG INJ IV PUSH: CPT

## 2022-04-03 PROCEDURE — 70553 MRI BRAIN STEM W/O & W/DYE: CPT | Mod: MG

## 2022-04-03 PROCEDURE — 99285 EMERGENCY DEPT VISIT HI MDM: CPT

## 2022-04-03 PROCEDURE — 93005 ELECTROCARDIOGRAM TRACING: CPT

## 2022-04-03 PROCEDURE — 36415 COLL VENOUS BLD VENIPUNCTURE: CPT

## 2022-04-03 PROCEDURE — 80053 COMPREHEN METABOLIC PANEL: CPT

## 2022-04-03 PROCEDURE — A9585: CPT

## 2022-04-03 PROCEDURE — G1004: CPT

## 2022-04-03 PROCEDURE — 70553 MRI BRAIN STEM W/O & W/DYE: CPT | Mod: 26,MG

## 2022-04-03 RX ADMIN — Medication 58 MILLIGRAM(S): at 21:32

## 2022-04-03 NOTE — ED PROVIDER NOTE - CLINICAL SUMMARY MEDICAL DECISION MAKING FREE TEXT BOX
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  Neuro exam intact, pt nontoxic appearing.  Labs wnl, MR brain done and pending result.  Will f/u MR and reassess.

## 2022-04-03 NOTE — ED PROVIDER NOTE - PROGRESS NOTE DETAILS
MRI done, read pending, pt wanting to be d/c, not wanting to wait for MRI, will f/u with Dr. Najjar, will review MRI when report comes in and call pt to discuss, advised to return to ED if sx worsen.

## 2022-04-03 NOTE — ED PROVIDER NOTE - OBJECTIVE STATEMENT
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  Pt also reports nausea, not feeling well in general.  Pt stating he spoke to Dr. Najjar his neurologist who suggested he come to ED for an MRI brain and a dose of IV solumedrol.  Pt stating these symptoms have been ongoing and recurred today.  Denies fever, chills, CP, SOB, all other ROS negative.

## 2022-04-03 NOTE — ED PROVIDER NOTE - NSCAREINITIATED _GEN_ER
OFFICE VISIT      Patient: Sandra Mario Date of Service: 3/23/2021   : 1992 MRN: [id number]     SUBJECTIVE:     Chief Complaint   Patient presents with   • Office Visit   • STI Screening         HISTORY OF PRESENT ILLNESS:  Sandra Mario is a 29 year old female who presents today for STD testing.  Recent break up with partner. Asymptomatic.      OB History    Para Term  AB Living   1 0 0 0 0 0   SAB TAB Ectopic Molar Multiple Live Births   0 0 0 0 0 0        ALLERGIES:  No Known Allergies    Review of Systems   Constitutional: Negative.    HENT: Negative.    Eyes: Negative.    Respiratory: Negative.    Cardiovascular: Negative.    Gastrointestinal: Negative.    Genitourinary: Negative.    Skin: Negative.    Neurological: Negative.    Psychiatric/Behavioral: Negative.        MEDICATIONS:  Current Outpatient Medications   Medication Sig   • metFORMIN (GLUCOPHAGE) 500 MG tablet Take 1 tablet by mouth 2 times daily (with meals).   • levothyroxine 175 MCG tablet Take 1 tablet by mouth daily.   • Ferrous Sulfate (Iron) 325 (65 Fe) MG Tab One tablet three times daily.   • amLODIPine (NORVASC) 10 MG tablet Take 1 tablet by mouth daily.   • Cholecalciferol (Vitamin D) 125 MCG (5000 UT) Cap Take 10,000 Units by mouth daily.   • mupirocin (BACTROBAN) 2 % ointment Apply 1 application topically 3 times daily. Apply thin layer to affected area three times daily as directed     No current facility-administered medications for this visit.        PAST MEDICAL HISTORY:  Past Medical History:   Diagnosis Date   • Encounter for piercing of external ear 2019   • No known problems        PAST SURGICAL HISTORY:  Past Surgical History:   Procedure Laterality Date   • Dilation and curettage of uterus         FAMILY HISTORY:  Family History   Problem Relation Age of Onset   • Patient is unaware of any medical problems Mother    • Patient is unaware of any medical problems Father        SOCIAL HISTORY:  Social History      Tobacco Use   • Smoking status: Never Smoker   • Smokeless tobacco: Never Used   Substance Use Topics   • Alcohol use: Yes     Frequency: 2-4 times a month     Comment: \"Ocassionally\"   • Drug use: No           OBJECTIVE:     Visit Vitals  BP (!) 132/92 (BP Location: LUE - Left upper extremity, Patient Position: Sitting)   Pulse 94   Temp 98.4 °F (36.9 °C) (Oral)   Resp 18   Ht 5' 9\" (1.753 m)   Wt (!) 171 kg (376 lb 15.8 oz)   LMP 02/25/2021   BMI 55.67 kg/m²       Physical Exam  Constitutional:       General: She is not in acute distress.     Appearance: Normal appearance. She is well-developed and well-groomed. She is not ill-appearing or diaphoretic.   Genitourinary:      Pelvic exam was performed with patient in the lithotomy position.      Vulva, inguinal canal and urethra normal.      No posterior fourchette tenderness, injury, rash or lesion present.      No signs of injury or lesions in the vagina.      Vaginal discharge, erythema and rugosity present.      No vaginal tenderness or bleeding.      Vaginal exam comments: Small amount of thin white discharge. No odor detected..      Cervix is nulliparous.      No cervical motion tenderness, discharge, friability, lesion, erythema or bleeding.   HENT:      Head: Normocephalic and atraumatic.   Eyes:      General:         Right eye: No discharge.         Left eye: No discharge.   Cardiovascular:      Rate and Rhythm: Normal rate.   Pulmonary:      Effort: Pulmonary effort is normal. No respiratory distress.   Abdominal:      General: There is no distension.      Palpations: Abdomen is soft. There is no mass.      Tenderness: There is no abdominal tenderness. There is no guarding or rebound.      Hernia: No hernia is present.   Lymphadenopathy:      Lower Body: No right inguinal adenopathy. No left inguinal adenopathy.   Neurological:      Mental Status: She is alert and oriented to person, place, and time.   Skin:     General: Skin is warm and dry.       Coloration: Skin is not pale.      Findings: No erythema or rash.   Psychiatric:         Attention and Perception: Attention and perception normal.         Mood and Affect: Mood and affect normal.         Speech: Speech normal.         Behavior: Behavior normal. Behavior is cooperative.         Thought Content: Thought content normal.         Cognition and Memory: Cognition and memory normal.         Judgment: Judgment normal.   Vitals signs and nursing note reviewed.                DIAGNOSTIC STUDIES:   LAB RESULTS:  No results found for this visit on 03/22/21.    Reviewed previous lab results.           ASSESSMENT AND PLAN:     (N89.8) Vaginal discharge  (primary encounter diagnosis)  Plan: VAGINAL PATHOGENS, CHLAMYDIA/GONORRHEA BY         NUCLEIC ACID AMPLIFICATION, POCT WET MOUNT KOH,        VAGINAL, POCT WET MOUNT SALINE, VAGINAL    (Z11.3) Routine screening for STI (sexually transmitted infection)  Plan: CHLAMYDIA/GONORRHEA BY NUCLEIC ACID         AMPLIFICATION, RPR, HIV ANTIGEN/ANTIBODY SCREEN        Plan      Orders Placed This Encounter   • VAGINAL PATHOGENS   • Chlamydia/Gonorrhea by Nucleic Acid Amplification   • RPR   • HIV Antigen/Antibody Screen   • POCT WET MOUNT DIANNA, VAGINAL   • POCT WET MOUNT SALINE, VAGINAL   • DISCONTD: azithromycin (ZITHROMAX) 250 MG tablet   • DISCONTD: metroNIDAZOLE (FLAGYL) 500 MG tablet         Vulvar and vaginal care: Do not scrub, Avoid soap, wash with warm water only  Avoid shaving, especially near the vagina.  Trimming hair is ok  Wear all cotton underwear OR synthetic underpants with cotton crotch  Avoid thong underwear: may cause bacteria to move from \"back to front\"  Use condoms when possible  Wipe from \"front to back\"  Wash hands and partner's hands/penis before sex  Use water-based lubricants such as KY products, unscented, unflavored, \"Omaira\", \"Intrigue\", \"Slippery Stuff\"  Use condoms with anal intercourse, use a new condom for vaginal intercourse.  Wash sex  toys  Avoid douching and feminine hygiene products; the vagina is self-cleaning  Sleep without underwear; wear loose clothing during the day, especially avoid tight jeans    Instructions provided as documented in the AVS.    Return for annual pap smear and exam per guidelines.    Radha Quiros CNP  3/23/2021    Roman Rodríguez)

## 2022-04-03 NOTE — ED ADULT NURSE NOTE - IS THE PATIENT ABLE TO BE SCREENED?
rosuvastatin (CRESTOR) 40 MG tablet    Last Written Prescription Date:  2/22/19  Last Fill Quantity: 90,   # refills: 3  Last Office Visit : 2/20/20  Future Office visit:  4/29/20       LDL past due. No message sent per addended protocol    90 day to pharmacy.  
Yes

## 2022-04-03 NOTE — ED PROVIDER NOTE - NS ED ATTENDING STATEMENT MOD
This was a shared visit with the LUIS ENRIQUE. I reviewed and verified the documentation and independently performed the documented:

## 2022-04-03 NOTE — ED PROVIDER NOTE - PATIENT PORTAL LINK FT
You can access the FollowMyHealth Patient Portal offered by [facility] by registering at the following website: [email or web address]. By joining [employer]’s FollowMyHealth portal, you will also be able to view your health information using other applications (apps) compatible with our system.

## 2022-04-03 NOTE — ED ADULT NURSE NOTE - NSIMPLEMENTINTERV_GEN_ALL_ED
Implemented All Universal Safety Interventions:  [place] to call system. Call bell, personal items and telephone within reach. Instruct patient to call for assistance. Room bathroom lighting operational. Non-slip footwear when patient is off stretcher. Physically safe environment: no spills, clutter or unnecessary equipment. Stretcher in lowest position, wheels locked, appropriate side rails in place.

## 2022-04-04 LAB
ALBUMIN SERPL ELPH-MCNC: 4.7 G/DL
ALP BLD-CCNC: 70 U/L
ALT SERPL-CCNC: 20 U/L
ANA SER IF-ACNC: NEGATIVE
ANION GAP SERPL CALC-SCNC: 18 MMOL/L
AST SERPL-CCNC: 17 U/L
BASOPHILS # BLD AUTO: 0.01 K/UL
BASOPHILS NFR BLD AUTO: 0.1 %
BILIRUB SERPL-MCNC: 0.9 MG/DL
BUN SERPL-MCNC: 30 MG/DL
CALCIUM SERPL-MCNC: 10.1 MG/DL
CHLORIDE SERPL-SCNC: 99 MMOL/L
CO2 SERPL-SCNC: 20 MMOL/L
CREAT SERPL-MCNC: 1.31 MG/DL
CRP SERPL-MCNC: <3 MG/L
DEPRECATED KAPPA LC FREE/LAMBDA SER: 2.69 RATIO
EGFR: 59 ML/MIN/1.73M2
EOSINOPHIL # BLD AUTO: 0 K/UL
EOSINOPHIL NFR BLD AUTO: 0 %
ERYTHROCYTE [SEDIMENTATION RATE] IN BLOOD BY WESTERGREN METHOD: 19 MM/HR
FERRITIN SERPL-MCNC: 64 NG/ML
GLUCOSE SERPL-MCNC: 91 MG/DL
HCT VFR BLD CALC: 52.6 %
HGB BLD-MCNC: 16.6 G/DL
IGA SER QL IEP: 177 MG/DL
IGG SER QL IEP: 738 MG/DL
IGM SER QL IEP: 127 MG/DL
IMM GRANULOCYTES NFR BLD AUTO: 0.9 %
KAPPA LC CSF-MCNC: 0.86 MG/DL
KAPPA LC SERPL-MCNC: 2.31 MG/DL
LYMPHOCYTES # BLD AUTO: 0.92 K/UL
LYMPHOCYTES NFR BLD AUTO: 8.7 %
MAN DIFF?: NORMAL
MCHC RBC-ENTMCNC: 29 PG
MCHC RBC-ENTMCNC: 31.6 GM/DL
MCV RBC AUTO: 92 FL
MONOCYTES # BLD AUTO: 0.55 K/UL
MONOCYTES NFR BLD AUTO: 5.2 %
MYELOPEROXIDASE AB SER QL IA: <5 UNITS
MYELOPEROXIDASE CELLS FLD QL: NEGATIVE
NEUTROPHILS # BLD AUTO: 8.98 K/UL
NEUTROPHILS NFR BLD AUTO: 85.1 %
PLATELET # BLD AUTO: 167 K/UL
POTASSIUM SERPL-SCNC: 4.5 MMOL/L
PROT SERPL-MCNC: 6.8 G/DL
PROTEINASE3 AB SER IA-ACNC: <5 UNITS
PROTEINASE3 AB SER-ACNC: NEGATIVE
RBC # BLD: 5.72 M/UL
RBC # FLD: 14.6 %
RHEUMATOID FACT SER QL: <10 IU/ML
SODIUM SERPL-SCNC: 138 MMOL/L
WBC # FLD AUTO: 10.55 K/UL

## 2022-04-05 ENCOUNTER — RX RENEWAL (OUTPATIENT)
Age: 67
End: 2022-04-05

## 2022-04-05 ENCOUNTER — APPOINTMENT (OUTPATIENT)
Dept: GASTROENTEROLOGY | Facility: HOSPITAL | Age: 67
End: 2022-04-05
Payer: COMMERCIAL

## 2022-04-05 DIAGNOSIS — K22.70 BARRETT'S ESOPHAGUS W/OUT DYSPLASIA: ICD-10-CM

## 2022-04-05 DIAGNOSIS — R11.0 NAUSEA: ICD-10-CM

## 2022-04-05 LAB
CCP AB SER IA-ACNC: <8 UNITS
RF+CCP IGG SER-IMP: NEGATIVE

## 2022-04-05 PROCEDURE — 99205 OFFICE O/P NEW HI 60 MIN: CPT

## 2022-04-07 LAB
ALBUMIN MFR SERPL ELPH: 64.1 %
ALBUMIN SERPL-MCNC: 4.4 G/DL
ALBUMIN/GLOB SERPL: 1.8 RATIO
ALPHA1 GLOB MFR SERPL ELPH: 3.8 %
ALPHA1 GLOB SERPL ELPH-MCNC: 0.3 G/DL
ALPHA2 GLOB MFR SERPL ELPH: 10.4 %
ALPHA2 GLOB SERPL ELPH-MCNC: 0.7 G/DL
B-GLOBULIN MFR SERPL ELPH: 10.2 %
B-GLOBULIN SERPL ELPH-MCNC: 0.7 G/DL
GAMMA GLOB FLD ELPH-MCNC: 0.8 G/DL
GAMMA GLOB MFR SERPL ELPH: 11.5 %
INTERPRETATION SERPL IEP-IMP: NORMAL
M PROTEIN SPEC IFE-MCNC: NORMAL
PROT SERPL-MCNC: 6.8 G/DL
PROT SERPL-MCNC: 6.8 G/DL

## 2022-04-11 PROBLEM — J04.0 REFLUX LARYNGITIS: Status: ACTIVE | Noted: 2018-01-31

## 2022-04-12 ENCOUNTER — OUTPATIENT (OUTPATIENT)
Dept: OUTPATIENT SERVICES | Facility: HOSPITAL | Age: 67
LOS: 1 days | Discharge: HOME | End: 2022-04-12
Payer: COMMERCIAL

## 2022-04-12 ENCOUNTER — APPOINTMENT (OUTPATIENT)
Dept: GASTROENTEROLOGY | Facility: HOSPITAL | Age: 67
End: 2022-04-12

## 2022-04-12 ENCOUNTER — RESULT REVIEW (OUTPATIENT)
Age: 67
End: 2022-04-12

## 2022-04-12 DIAGNOSIS — B37.81 CANDIDAL ESOPHAGITIS: ICD-10-CM

## 2022-04-12 PROCEDURE — 88341 IMHCHEM/IMCYTCHM EA ADD ANTB: CPT

## 2022-04-12 PROCEDURE — 88305 TISSUE EXAM BY PATHOLOGIST: CPT | Mod: 26

## 2022-04-12 PROCEDURE — 43239 EGD BIOPSY SINGLE/MULTIPLE: CPT

## 2022-04-12 PROCEDURE — 88305 TISSUE EXAM BY PATHOLOGIST: CPT

## 2022-04-12 PROCEDURE — 88342 IMHCHEM/IMCYTCHM 1ST ANTB: CPT | Mod: 26

## 2022-04-12 PROCEDURE — 88341 IMHCHEM/IMCYTCHM EA ADD ANTB: CPT | Mod: 26

## 2022-04-13 LAB — SURGICAL PATHOLOGY STUDY: SIGNIFICANT CHANGE UP

## 2022-04-18 ENCOUNTER — APPOINTMENT (OUTPATIENT)
Dept: INTERNAL MEDICINE | Facility: CLINIC | Age: 67
End: 2022-04-18
Payer: COMMERCIAL

## 2022-04-18 ENCOUNTER — NON-APPOINTMENT (OUTPATIENT)
Age: 67
End: 2022-04-18

## 2022-04-18 DIAGNOSIS — R00.2 PALPITATIONS: ICD-10-CM

## 2022-04-18 DIAGNOSIS — D89.40 MAST CELL ACTIVATION, UNSPECIFIED: ICD-10-CM

## 2022-04-18 DIAGNOSIS — C91.Z0 OTHER LYMPHOID LEUKEMIA NOT HAVING ACHIEVED REMISSION: ICD-10-CM

## 2022-04-18 PROCEDURE — 36415 COLL VENOUS BLD VENIPUNCTURE: CPT

## 2022-04-18 PROCEDURE — 93000 ELECTROCARDIOGRAM COMPLETE: CPT

## 2022-04-19 LAB
ALBUMIN SERPL ELPH-MCNC: 4.2 G/DL
ALBUMIN SERPL ELPH-MCNC: 4.2 G/DL
ALP BLD-CCNC: 76 U/L
ALP BLD-CCNC: 78 U/L
ALT SERPL-CCNC: 26 U/L
ALT SERPL-CCNC: 28 U/L
ANION GAP SERPL CALC-SCNC: 13 MMOL/L
ANION GAP SERPL CALC-SCNC: 14 MMOL/L
AST SERPL-CCNC: 22 U/L
AST SERPL-CCNC: 25 U/L
BASOPHILS # BLD AUTO: 0.04 K/UL
BASOPHILS NFR BLD AUTO: 0.7 %
BILIRUB SERPL-MCNC: 0.4 MG/DL
BILIRUB SERPL-MCNC: 0.5 MG/DL
BUN SERPL-MCNC: 22 MG/DL
BUN SERPL-MCNC: 23 MG/DL
CALCIUM SERPL-MCNC: 9.3 MG/DL
CALCIUM SERPL-MCNC: 9.4 MG/DL
CHLORIDE SERPL-SCNC: 106 MMOL/L
CHLORIDE SERPL-SCNC: 108 MMOL/L
CO2 SERPL-SCNC: 23 MMOL/L
CO2 SERPL-SCNC: 23 MMOL/L
CREAT SERPL-MCNC: 1.45 MG/DL
CREAT SERPL-MCNC: 1.48 MG/DL
CRP SERPL-MCNC: <3 MG/L
EGFR: 51 ML/MIN/1.73M2
EGFR: 52 ML/MIN/1.73M2
EOSINOPHIL # BLD AUTO: 0.25 K/UL
EOSINOPHIL NFR BLD AUTO: 4.1 %
ERYTHROCYTE [SEDIMENTATION RATE] IN BLOOD BY WESTERGREN METHOD: 8 MM/HR
GLUCOSE SERPL-MCNC: 116 MG/DL
GLUCOSE SERPL-MCNC: 117 MG/DL
HCT VFR BLD CALC: 55.6 %
HGB BLD-MCNC: 17.6 G/DL
IMM GRANULOCYTES NFR BLD AUTO: 2.4 %
LYMPHOCYTES # BLD AUTO: 1.33 K/UL
LYMPHOCYTES NFR BLD AUTO: 21.7 %
MAN DIFF?: NORMAL
MCHC RBC-ENTMCNC: 28.6 PG
MCHC RBC-ENTMCNC: 31.7 GM/DL
MCV RBC AUTO: 90.3 FL
MONOCYTES # BLD AUTO: 0.31 K/UL
MONOCYTES NFR BLD AUTO: 5 %
NEUTROPHILS # BLD AUTO: 4.06 K/UL
NEUTROPHILS NFR BLD AUTO: 66.1 %
PLATELET # BLD AUTO: NORMAL K/UL
POTASSIUM SERPL-SCNC: 4.7 MMOL/L
POTASSIUM SERPL-SCNC: 4.8 MMOL/L
PROT SERPL-MCNC: 5.7 G/DL
PROT SERPL-MCNC: 5.8 G/DL
RBC # BLD: 6.16 M/UL
RBC # FLD: 14.5 %
SODIUM SERPL-SCNC: 142 MMOL/L
SODIUM SERPL-SCNC: 144 MMOL/L
WBC # FLD AUTO: 6.14 K/UL

## 2022-04-20 LAB — TOTAL IGE SMQN RAST: 22 KU/L

## 2022-04-21 LAB — IL6 SERPL-MCNC: 219.6 PG/ML

## 2022-04-22 LAB — HISTAMINE BLD-MCNC: 0.49 NG/ML

## 2022-04-23 DIAGNOSIS — R10.9 UNSPECIFIED ABDOMINAL PAIN: ICD-10-CM

## 2022-04-23 LAB — TRYPTASE: 2.2 UG/L

## 2022-04-25 ENCOUNTER — NON-APPOINTMENT (OUTPATIENT)
Age: 67
End: 2022-04-25

## 2022-04-26 LAB
ALBUMIN SERPL ELPH-MCNC: 4 G/DL
ALP BLD-CCNC: 58 U/L
ALT SERPL-CCNC: 25 U/L
ANION GAP SERPL CALC-SCNC: 11 MMOL/L
AST SERPL-CCNC: 26 U/L
BILIRUB SERPL-MCNC: 0.6 MG/DL
BUN SERPL-MCNC: 18 MG/DL
CALCIUM SERPL-MCNC: 9.2 MG/DL
CHLORIDE SERPL-SCNC: 106 MMOL/L
CO2 SERPL-SCNC: 22 MMOL/L
CORTIS SERPL-MCNC: 7 UG/DL
CREAT SERPL-MCNC: 1.52 MG/DL
EGFR: 50 ML/MIN/1.73M2
GLUCOSE SERPL-MCNC: 94 MG/DL
POTASSIUM SERPL-SCNC: 4.4 MMOL/L
PROT SERPL-MCNC: 5.7 G/DL
SODIUM SERPL-SCNC: 140 MMOL/L

## 2022-04-27 ENCOUNTER — APPOINTMENT (OUTPATIENT)
Dept: GASTROENTEROLOGY | Facility: CLINIC | Age: 67
End: 2022-04-27
Payer: COMMERCIAL

## 2022-04-27 ENCOUNTER — OUTPATIENT (OUTPATIENT)
Dept: OUTPATIENT SERVICES | Facility: HOSPITAL | Age: 67
LOS: 1 days | End: 2022-04-27
Payer: COMMERCIAL

## 2022-04-27 VITALS
TEMPERATURE: 97.2 F | BODY MASS INDEX: 20.92 KG/M2 | OXYGEN SATURATION: 98 % | RESPIRATION RATE: 16 BRPM | DIASTOLIC BLOOD PRESSURE: 72 MMHG | HEIGHT: 68 IN | HEART RATE: 58 BPM | SYSTOLIC BLOOD PRESSURE: 125 MMHG | WEIGHT: 138 LBS

## 2022-04-27 PROCEDURE — 99215 OFFICE O/P EST HI 40 MIN: CPT

## 2022-04-27 PROCEDURE — 74019 RADEX ABDOMEN 2 VIEWS: CPT | Mod: 26

## 2022-04-27 PROCEDURE — 74019 RADEX ABDOMEN 2 VIEWS: CPT

## 2022-04-27 NOTE — HISTORY OF PRESENT ILLNESS
[de-identified] : Is a follow-up appointment for 67-year-old man with a complex medical history as detailed in the new patient visit.  We had had several exchanges over email about his ongoing symptoms.  He did have an upper endoscopy that identified candidal esophagitis and he finished his 14-day fluconazole course.  He had also tapered off too quickly with the steroids and had felt weak and had significant constitutional symptoms.  He underwent testing for adrenal insufficiency with Dr. Bergman and was found to have a low a.m. cortisol level.  He was restarted on 10 mg of prednisone and is feeling much better.  He plans to have another treatment with an anti-IL-6 treatment this week for the management of his vasculitis.\par \par The patient reports that he has had nausea for 7 years but this has become more dramatic recently and that his complaints also include a little bit of anhedonia early satiety.  We did do an x-ray that shows constipation even though he says he has 1 effective bowel movement per day.  We also discussed that his stress level definitely affects his mood and his early satiety symptoms.  He has in the past been prescribed lamotrigine for mood but did not take it on a long-term basis.  Is not clear to me whether this was part of his medical regimen for his vasculitis or whether this was specifically for mood complaints.  He reports having been on other antidepressants in the past and reports not feeling well but cannot remember if he was specifically on Wellbutrin.\par \par Since his last visit I have asked him to discontinue all THC containing products which she has I have also brought up the question of narcotic bowel syndrome and he has stopped all narcotic use as well.\par \par

## 2022-04-27 NOTE — PHYSICAL EXAM
[General Appearance - Alert] : alert [Sclera] : the sclera and conjunctiva were normal [Outer Ear] : the ears and nose were normal in appearance [Neck Appearance] : the appearance of the neck was normal [Skin Color & Pigmentation] : normal skin color and pigmentation [Oriented To Time, Place, And Person] : oriented to person, place, and time

## 2022-04-27 NOTE — ASSESSMENT
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  He continues to have symptoms.\par \par #1 candidal esophagitis status post 14 days fluconazole treatment finishing today.\par #2 constipation as seen on x-ray with plan for a MiraLAX prep overnight with daily MiraLAX use which has been his baseline.  I do need to clarify whether he is continuing use of Fioricet.\par #3 we can try a low-dose Wellbutrin strategy to see if that helps with anxiety and a sense of early satiety by improving gastric accommodation.  I will wait to do this until he reports back on his symptom improvement with treatment of constipation.  I am not sure that lamotrigine would give the same beneficial effect from a purely GI perspective although he believes that it may be overall better for his mood.  That may be the more relevant and overarching issue and I would advocate for him to be managed with expertise in psychiatry.\par #4 low-dose prednisone maintenance as there is some concern that his giant cell arteritis may also have a component of GI vasculitis that is managed with some steroid use.\par \par He will email me in 1 week with an update on his constipation treatment.

## 2022-04-27 NOTE — CONSULT LETTER
[Dear  ___] : Dear  [unfilled], [Courtesy Letter:] : I had the pleasure of seeing your patient, [unfilled], in my office today. [Please see my note below.] : Please see my note below. [Sincerely,] : Sincerely, [FreeTextEntry3] : Madhu West MD\par Associate Professor of Medicine\par Chief of GI\par Director IBD Program\par [facility]\par  [DrYasir  ___] : Dr. SMITH

## 2022-04-28 ENCOUNTER — APPOINTMENT (OUTPATIENT)
Dept: ENDOCRINOLOGY | Facility: CLINIC | Age: 67
End: 2022-04-28
Payer: COMMERCIAL

## 2022-04-28 VITALS
HEIGHT: 68 IN | SYSTOLIC BLOOD PRESSURE: 151 MMHG | BODY MASS INDEX: 20.16 KG/M2 | WEIGHT: 133 LBS | DIASTOLIC BLOOD PRESSURE: 85 MMHG | HEART RATE: 71 BPM

## 2022-04-28 DIAGNOSIS — F19.239 OTHER PSYCHOACTIVE SUBSTANCE DEPENDENCE WITH WITHDRAWAL, UNSPECIFIED: ICD-10-CM

## 2022-04-28 PROCEDURE — 99204 OFFICE O/P NEW MOD 45 MIN: CPT

## 2022-04-28 NOTE — HISTORY OF PRESENT ILLNESS
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

## 2022-04-28 NOTE — REVIEW OF SYSTEMS
[As Noted in HPI] : as noted in HPI [Fatigue] : fatigue [Recent Weight Loss (___ Lbs)] : recent weight loss: [unfilled] lbs [FreeTextEntry2] : nausea, myalgia, depression

## 2022-04-28 NOTE — ASSESSMENT
[FreeTextEntry1] : Steroid withdrawal syndrome.\par Giant cell arteritis.\par \par Will change prednisone to 5 mg before breakfast and before lunch.\par F/U - 1 week.

## 2022-05-02 ENCOUNTER — APPOINTMENT (OUTPATIENT)
Dept: GASTROENTEROLOGY | Facility: CLINIC | Age: 67
End: 2022-05-02

## 2022-05-03 ENCOUNTER — APPOINTMENT (OUTPATIENT)
Dept: MRI IMAGING | Facility: HOSPITAL | Age: 67
End: 2022-05-03

## 2022-05-03 DIAGNOSIS — S86.111A STRAIN OF OTHER MUSCLE(S) AND TENDON(S) OF POSTERIOR MUSCLE GROUP AT LOWER LEG LEVEL, RIGHT LEG, INITIAL ENCOUNTER: ICD-10-CM

## 2022-05-03 LAB
CORTICOSTEROID BIND GLOBULIN: 2.1 MG/DL
CORTIS SERPL-MCNC: 13 UG/DL
CORTISOL, FREE: 1.4 UG/DL
PFCX: 11 %

## 2022-05-10 RX ORDER — NIRMATRELVIR AND RITONAVIR 300-100 MG
20 X 150 MG & KIT ORAL
Qty: 1 | Refills: 0 | Status: DISCONTINUED | COMMUNITY
Start: 2022-04-22 | End: 2022-05-10

## 2022-05-10 RX ORDER — MIRTAZAPINE 7.5 MG/1
7.5 TABLET, FILM COATED ORAL
Qty: 90 | Refills: 3 | Status: DISCONTINUED | COMMUNITY
Start: 2021-08-02 | End: 2022-05-10

## 2022-05-20 NOTE — ED PROVIDER NOTE - TEMPLATE, MLM
[address] [place] [address] and Primary Care  [facility]  [address] [facility] [address]  Phone:  [phone]  Fax: [phone]       Chief Complaint   Patient presents with    Hypertension     routine follow up    . SUBJECTIVE:    Chris Hayes is a 69 y.o. female comes in for return visit stating that she has done generally well. She is concerned about her blood pressure. In reality her blood pressure has been doing quite well. I did not bother her blood pressure medication leaving that to the cardiologist because of her vacillation as it relates to treatment. She has episodes of increasing heart rate having had this about two days ago and did last for a short time and abated. Apparently there was mild diarrhea associated with this although I do not think the two were necessarily related. She has a past history of anxiety which is stable and dyslipidemia. She has still refused to get the vaccine. As a matter of fact, I did not discuss it any longer with her. Current Outpatient Medications   Medication Sig Dispense Refill    rosuvastatin (CRESTOR) 10 mg tablet Take 1 tablet every day 90 Tablet 3    Xarelto 20 mg tab tablet TAKE 1 TABLET BY MOUTH EVERY DAY 30 Tablet 0    albuterol (Ventolin HFA) 90 mcg/actuation inhaler Take 2 Puffs by inhalation every four (4) hours as needed for Wheezing. 1 Each 11    hydroCHLOROthiazide (HYDRODIURIL) 12.5 mg tablet TAKE 1 TABLET ONE TIME DAILY 90 Tablet 3    metoprolol succinate (TOPROL-XL) 25 mg XL tablet TAKE 1 TABLET TWICE DAILY 180 Tablet 3    potassium chloride (K-DUR, KLOR-CON M20) 20 mEq tablet TAKE 1 TABLET EVERY DAY 90 Tablet 3    magnesium oxide (MAG-OX) 400 mg tablet TAKE 1 TABLET EVERY DAY 90 Tablet 3    fluticasone propion-salmeteroL (ADVAIR/WIXELA) 250-50 mcg/dose diskus inhaler Take 1 Puff by inhalation every twelve (12) hours.  1 Inhaler 11    ascorbic acid, vitamin C, (Vitamin C) 500 mg tablet Take  by mouth.      cholecalciferol, vitamin D3, (Vitamin D3) 50 mcg (2,000 unit) tab Take  by mouth.  elderberry fruit (ELDERBERRY PO) Take  by mouth.  folic acid/multivit-min/lutein (CENTRUM SILVER PO) Take 1 Tab by mouth daily.  QUEtiapine (SEROQUEL) 50 mg tablet Take 50 mg by mouth three (3) times daily.  FLUoxetine (PROZAC) 20 mg capsule Take 20 mg by mouth daily.        Past Medical History:   Diagnosis Date    Abdominal pain     Asthma     Hepatic cyst 6/22/2019    Lumbar radicular pain 8/21/2014    Synovitis of knee 1/28/2018     Past Surgical History:   Procedure Laterality Date    ABLATION OF SVT  3/30/2015         EP STUDY W/INDUCTION  3/30/2015         HX BACK SURGERY      HX COLONOSCOPY  9-    HX HEART CATHETERIZATION      HX HYSTERECTOMY  1992     Allergies   Allergen Reactions    Ampicillin Nausea Only     Allergy was 30 years ago         REVIEW OF SYSTEMS:  General: negative for - chills or fever  ENT: negative for - headaches, nasal congestion or tinnitus  Respiratory: negative for - cough, hemoptysis, shortness of breath or wheezing  Cardiovascular : negative for - chest pain, edema, palpitations or shortness of breath  Gastrointestinal: negative for - abdominal pain, blood in stools, heartburn or nausea/vomiting  Genito-Urinary: no dysuria, trouble voiding, or hematuria  Musculoskeletal: negative for - gait disturbance, joint pain, joint stiffness or joint swelling  Neurological: no TIA or stroke symptoms  Hematologic: no bruises, no bleeding, no swollen glands  Integument: no lumps, mole changes, nail changes or rash  Endocrine: no malaise/lethargy or unexpected weight changes      Social History     Socioeconomic History    Marital status:     Number of children: 1   Occupational History    Occupation: retired--[employer]---disabled   Tobacco Use    Smoking status: Never Smoker    Smokeless tobacco: Never Used   Vaping Use    Vaping Use: Never used Substance and Sexual Activity    Alcohol use: No     Alcohol/week: 0.0 standard drinks     Comment: rarely    Drug use: No    Sexual activity: Not Currently     Partners: Male     Family History   Problem Relation Age of Onset    Hypertension Mother     Heart Disease Mother     Heart Disease Father     Stroke Father        OBJECTIVE:    Visit Vitals  /78   Pulse 77   Temp 98.2 °F (36.8 °C) (Oral)   Resp 16   Ht 5' 2\" (1.575 m)   Wt 135 lb 8 oz (61.5 kg)   SpO2 95%   BMI 24.78 kg/m²     CONSTITUTIONAL: well , well nourished, appears age appropriate  EYES: perrla, eom intact  ENMT:moist mucous membranes, pharynx clear  NECK: supple. Thyroid normal  RESPIRATORY: Chest: clear to ascultation and percussion   CARDIOVASCULAR: Heart: regular rate and rhythm  GASTROINTESTINAL: Abdomen: soft, bowel sounds active  HEMATOLOGIC: no pathological lymph nodes palpated  MUSCULOSKELETAL: Extremities: no edema, pulse 1+   INTEGUMENT: No unusual rashes or suspicious skin lesions noted. Nails appear normal.  NEUROLOGIC: non-focal exam   MENTAL STATUS: alert and oriented, appropriate affect      ASSESSMENT:  1. Paroxysmal atrial fibrillation (HCC)    2. Primary hypertension    3. Anxiety neurosis    4. Dyslipidemia        PLAN:  1. She is sinus rhythm today. 2. Blood pressure is actually reasonable. 3. As far as her anxiety is concerned, there is nothing more that can be done for now. She needs to followup with Psychiatry if her anxiety level increases. 4. She is also taking statin on a regular basis. 5. I encouraged her to get her vaccine. Follow-up and Dispositions    · Return in about 4 months (around 9/19/2022).            Carolin Reed MD Cardiac General

## 2022-05-31 ENCOUNTER — APPOINTMENT (OUTPATIENT)
Dept: OPHTHALMOLOGY | Facility: CLINIC | Age: 67
End: 2022-05-31
Payer: COMMERCIAL

## 2022-05-31 ENCOUNTER — APPOINTMENT (OUTPATIENT)
Dept: OPHTHALMOLOGY | Facility: CLINIC | Age: 67
End: 2022-05-31
Payer: SELF-PAY

## 2022-05-31 ENCOUNTER — NON-APPOINTMENT (OUTPATIENT)
Age: 67
End: 2022-05-31

## 2022-05-31 PROCEDURE — 92015 DETERMINE REFRACTIVE STATE: CPT

## 2022-05-31 PROCEDURE — 92004 COMPRE OPH EXAM NEW PT 1/>: CPT

## 2022-06-08 NOTE — ED ADULT NURSE NOTE - CAS TRG GEN SKIN COLOR
Assessment/Plan:    Chronic hypoxemic respiratory failure ([address] )  She has chronic hypoxemic respiratory failure  She is on continuous oxygen supplementation 3 LPM during rest and up to 6 LPM during exertion  She is with the [facility]    COPD (chronic obstructive pulmonary disease) [facility])  Mrs Mary Spear hasCOPD for more VIAV 98 years and is  on home oxygen therapy at 3 L/m for chronic hypoxemic respiratory failure  She has shortness of breath on exertion and her exercise tolerance is about a block    She was a smoker for a long time for about 35 years smoking 1-1/2 packs per day  She also 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  She quit smoking 15 years back  On clinical examination she was saturating well with oxygen supplementation  Her chest auscultation revealed bilateral basilar inspiratory coarse crackles  Her previous CT scan from 2015 showed bilateral extensive emphysematous changes  Her high-resolution CT scan of the chest showed bilateral emphysematous changes and mild bronchiectasis  Her PFT showed moderately severe airflow obstruction with severe uncorrected diffusion defect  she was doing well    she is on treatment with nebulized ipratropium albuterol, formoterol and budesonide   I had a long discussion with her and answered all questions  JULIO (obstructive sleep apnea)  She has obstructive sleep apnea and has been on CPAP therapy with oxygen supplementation at 3 L/m  She had a CPAP titration study in February 2016 which showed a CPAP pressure requirement of 9 cm and was using nasal pillows  She was using the CPAP every night and was comfortable with the mask and pressure  Rosaura Hernandez is her DME  On clinical examination, she is obese  She was awaiting a replacement for her CPAP machine after the recent recall  she got a new machine but that also developed problem  She is awaiting a replacement of this  Meanwhile she is using her 's old machine    She is feeling comfortable on that  She has no significant daytime sleepiness or morning headache  I spoke to the patient and answered all their questions  Non-small cell cancer of left lung (HCC)  She was diagnosed with non-small cell lung cancer 7 years back and had underwent wedge resection by Dr Ravinder Alarcon  She also received chemotherapy  She has been remaining cancer free  She follows with Dr Dustin Grimes from oncology  Multiple lung nodules  Her previous CT scan of the chest showed multiple calcified granuloma, scarring and lung nodules   These need to be followed up  Ghulam Adamson was also found to have thyroid nodules  Her CT scan from November 2021 showed multiple stable lung nodules           Diagnoses and all orders for this visit:    JULIO (obstructive sleep apnea)    Chronic obstructive pulmonary disease, unspecified COPD type ([facility] [address] )    Non-small cell cancer of left lung (HCC)    Multiple lung nodules    Congestive heart failure, unspecified HF chronicity, unspecified heart failure type (HCC)          Subjective:      Patient ID: Eric Ga is a 79 y o  female  Kevin Varghese came for follow-up for her COPD, chronic respiratory failure and obstructive sleep apnea  Currently she is on treatment with Pulmicort and nebulized ipratropium and albuterol  Her exercise tolerance is less than half a block  She has no significant phlegm  She has occasional wheezing  No chest pain or palpitations  No swelling of feet  No hoarseness of voice or dysphagia  She is on oxygen supplementation at 3-6 liters/minute  She has previous history of non-small cell lung cancer and is status post surgery and chemotherapy  She follows with Dr Dustin Grimes  She has obstructive sleep apnea and has been on CPAP therapy  Stopped using the CPAP after she was having problems with the mask and pressure  Her machine was on recall  He got a new machine but that was also giving her lot of trouble  She is waiting for a replacement machine again  Meanwhile she is using her 's old machine and feels very comfortable with it  He denied any significant daytime sleepiness or morning headache  She also has multiple lung nodules  There have been stable on the last CT scan from November 2021  The following portions of the patient's history were reviewed and updated as appropriate: allergies, current medications, past family history, past medical history, past social history, past surgical history and problem list     Review of Systems   Constitutional: Negative for appetite change, chills, fatigue and fever  HENT: Positive for rhinorrhea  Negative for hearing loss, sneezing, sore throat and trouble swallowing  Eyes: Negative for visual disturbance  Respiratory: Positive for shortness of breath  Negative for cough and wheezing  Cardiovascular: Negative for chest pain, palpitations and leg swelling  Gastrointestinal: Positive for diarrhea  Negative for abdominal pain, constipation and vomiting  Genitourinary: Negative for dysuria, frequency and urgency  Musculoskeletal: Positive for back pain  Negative for arthralgias  Skin: Negative for rash  Allergic/Immunologic: Positive for environmental allergies  Neurological: Negative for dizziness, light-headedness and headaches  Psychiatric/Behavioral: Negative for sleep disturbance  The patient is nervous/anxious  Objective:      /60 (BP Location: Left arm, Patient Position: Sitting, Cuff Size: Standard)   Pulse 66   Temp 98 °F (36 7 °C) (Tympanic)   Ht 5' 1" (1 549 m)   Wt 77 7 kg (171 lb 6 4 oz)   SpO2 (!) 80%   BMI 32 39 kg/m²          Physical Exam  Constitutional:       General: She is not in acute distress  Appearance: She is obese  She is not ill-appearing, toxic-appearing or diaphoretic  HENT:      Head: Normocephalic  Mouth/Throat:      Mouth: Mucous membranes are moist    Eyes:      General: No scleral icterus       Conjunctiva/sclera: Conjunctivae normal    Cardiovascular:      Rate and Rhythm: Normal rate  Heart sounds: Normal heart sounds  No murmur heard  Pulmonary:      Effort: Pulmonary effort is normal  No respiratory distress  Breath sounds: No stridor  Rales (coarse basal inspiratory) present  No wheezing or rhonchi  Chest:      Chest wall: No tenderness  Abdominal:      General: Bowel sounds are normal       Palpations: Abdomen is soft  Tenderness: There is no abdominal tenderness  There is no guarding  Musculoskeletal:      Cervical back: No rigidity  Right lower leg: No edema  Left lower leg: No edema  Lymphadenopathy:      Cervical: No cervical adenopathy  Skin:     Coloration: Skin is not jaundiced or pale  Findings: No rash  Neurological:      Mental Status: She is alert and oriented to person, place, and time  Gait: Gait normal    Psychiatric:         Mood and Affect: Mood normal          Behavior: Behavior normal          Thought Content: Thought content normal          Judgment: Judgment normal        I spent 30 minutes of time taking care of this patient with multiple complex pulmonary issues  The majority of this time was spent directly with the patient counseling as well as coordinating care  Normal for race

## 2022-06-16 DIAGNOSIS — Z00.00 ENCOUNTER FOR GENERAL ADULT MEDICAL EXAMINATION W/OUT ABNORMAL FINDINGS: ICD-10-CM

## 2022-06-20 LAB
25(OH)D3 SERPL-MCNC: 96.7 NG/ML
BASOPHILS # BLD AUTO: 0.01 K/UL
BASOPHILS NFR BLD AUTO: 0.2 %
CRP SERPL-MCNC: <3 MG/L
EOSINOPHIL # BLD AUTO: 0.03 K/UL
EOSINOPHIL NFR BLD AUTO: 0.5 %
ERYTHROCYTE [SEDIMENTATION RATE] IN BLOOD BY WESTERGREN METHOD: 2 MM/HR
HCT VFR BLD CALC: 48.3 %
HGB BLD-MCNC: 15.7 G/DL
IMM GRANULOCYTES NFR BLD AUTO: 0.4 %
LYMPHOCYTES # BLD AUTO: 2.74 K/UL
LYMPHOCYTES NFR BLD AUTO: 48.7 %
MAN DIFF?: NORMAL
MCHC RBC-ENTMCNC: 29.3 PG
MCHC RBC-ENTMCNC: 32.5 GM/DL
MCV RBC AUTO: 90.3 FL
MONOCYTES # BLD AUTO: 0.51 K/UL
MONOCYTES NFR BLD AUTO: 9.1 %
NEUTROPHILS # BLD AUTO: 2.32 K/UL
NEUTROPHILS NFR BLD AUTO: 41.1 %
PLATELET # BLD AUTO: 116 K/UL
PSA SERPL-MCNC: 1.55 NG/ML
RBC # BLD: 5.35 M/UL
RBC # FLD: 16.3 %
WBC # FLD AUTO: 5.63 K/UL

## 2022-06-21 LAB
ALBUMIN SERPL ELPH-MCNC: 4.2 G/DL
ALP BLD-CCNC: 58 U/L
ALT SERPL-CCNC: 99 U/L
ANION GAP SERPL CALC-SCNC: 16 MMOL/L
AST SERPL-CCNC: 63 U/L
BILIRUB SERPL-MCNC: 0.6 MG/DL
BUN SERPL-MCNC: 17 MG/DL
CALCIUM SERPL-MCNC: 9 MG/DL
CHLORIDE SERPL-SCNC: 106 MMOL/L
CO2 SERPL-SCNC: 20 MMOL/L
CREAT SERPL-MCNC: 1.48 MG/DL
EGFR: 51 ML/MIN/1.73M2
GLUCOSE SERPL-MCNC: 90 MG/DL
IL6 SERPL-MCNC: 55 PG/ML
POTASSIUM SERPL-SCNC: 4.9 MMOL/L
PROT SERPL-MCNC: 5.5 G/DL
SODIUM SERPL-SCNC: 142 MMOL/L

## 2022-06-23 LAB
TESTOST FREE SERPL-MCNC: 6.5 PG/ML
TESTOST SERPL-MCNC: 784 NG/DL

## 2022-07-07 DIAGNOSIS — Z01.818 ENCOUNTER FOR OTHER PREPROCEDURAL EXAMINATION: ICD-10-CM

## 2022-07-08 LAB
APTT BLD: 34.2 SEC
BASOPHILS # BLD AUTO: 0.01 K/UL
BASOPHILS NFR BLD AUTO: 0.2 %
EOSINOPHIL # BLD AUTO: 0.06 K/UL
EOSINOPHIL NFR BLD AUTO: 0.9 %
HCT VFR BLD CALC: 52.3 %
HGB BLD-MCNC: 16.2 G/DL
IMM GRANULOCYTES NFR BLD AUTO: 0.2 %
INR PPP: 0.9 RATIO
LYMPHOCYTES # BLD AUTO: 3.28 K/UL
LYMPHOCYTES NFR BLD AUTO: 51.3 %
MAN DIFF?: NORMAL
MCHC RBC-ENTMCNC: 28.6 PG
MCHC RBC-ENTMCNC: 31 GM/DL
MCV RBC AUTO: 92.2 FL
MONOCYTES # BLD AUTO: 0.6 K/UL
MONOCYTES NFR BLD AUTO: 9.4 %
NEUTROPHILS # BLD AUTO: 2.44 K/UL
NEUTROPHILS NFR BLD AUTO: 38 %
PLATELET # BLD AUTO: 138 K/UL
PT BLD: 10.5 SEC
RBC # BLD: 5.67 M/UL
RBC # FLD: 17.3 %
WBC # FLD AUTO: 6.4 K/UL

## 2022-07-19 PROBLEM — G47.419 NARCOLEPSY WITHOUT CATAPLEXY: Status: ACTIVE | Noted: 2020-09-03

## 2022-08-03 ENCOUNTER — APPOINTMENT (OUTPATIENT)
Dept: GASTROENTEROLOGY | Facility: CLINIC | Age: 67
End: 2022-08-03

## 2022-08-03 PROCEDURE — 99443: CPT

## 2022-08-05 ENCOUNTER — APPOINTMENT (OUTPATIENT)
Dept: NUCLEAR MEDICINE | Facility: HOSPITAL | Age: 67
End: 2022-08-05

## 2022-08-05 ENCOUNTER — OUTPATIENT (OUTPATIENT)
Dept: OUTPATIENT SERVICES | Facility: HOSPITAL | Age: 67
LOS: 1 days | End: 2022-08-05
Payer: COMMERCIAL

## 2022-08-05 PROCEDURE — 78264 GASTRIC EMPTYING IMG STUDY: CPT | Mod: 26

## 2022-08-05 PROCEDURE — 78264 GASTRIC EMPTYING IMG STUDY: CPT

## 2022-08-05 PROCEDURE — A9541: CPT

## 2022-08-24 ENCOUNTER — APPOINTMENT (OUTPATIENT)
Dept: ORTHOPEDIC SURGERY | Facility: CLINIC | Age: 67
End: 2022-08-24

## 2022-08-24 VITALS — HEIGHT: 68 IN | RESPIRATION RATE: 16 BRPM | BODY MASS INDEX: 20.16 KG/M2 | WEIGHT: 133 LBS

## 2022-08-24 DIAGNOSIS — M25.531 PAIN IN RIGHT WRIST: ICD-10-CM

## 2022-08-24 DIAGNOSIS — M65.332 TRIGGER FINGER, LEFT MIDDLE FINGER: ICD-10-CM

## 2022-08-24 PROCEDURE — 20600 DRAIN/INJ JOINT/BURSA W/O US: CPT | Mod: F5

## 2022-08-24 PROCEDURE — 99213 OFFICE O/P EST LOW 20 MIN: CPT | Mod: 25

## 2022-09-06 LAB
ALBUMIN SERPL ELPH-MCNC: 4.2 G/DL
ALP BLD-CCNC: 62 U/L
ALT SERPL-CCNC: 22 U/L
ANION GAP SERPL CALC-SCNC: 12 MMOL/L
AST SERPL-CCNC: 24 U/L
BASOPHILS # BLD AUTO: 0.01 K/UL
BASOPHILS NFR BLD AUTO: 0.2 %
BILIRUB SERPL-MCNC: 0.5 MG/DL
BUN SERPL-MCNC: 20 MG/DL
CALCIUM SERPL-MCNC: 9.3 MG/DL
CHLORIDE SERPL-SCNC: 108 MMOL/L
CO2 SERPL-SCNC: 24 MMOL/L
CREAT SERPL-MCNC: 1.34 MG/DL
CRP SERPL-MCNC: <3 MG/L
EGFR: 58 ML/MIN/1.73M2
EOSINOPHIL # BLD AUTO: 0.03 K/UL
EOSINOPHIL NFR BLD AUTO: 0.6 %
ERYTHROCYTE [SEDIMENTATION RATE] IN BLOOD BY WESTERGREN METHOD: 2 MM/HR
ESTRADIOL SERPL-MCNC: 12 PG/ML
GLUCOSE SERPL-MCNC: 93 MG/DL
HCT VFR BLD CALC: 50 %
HGB BLD-MCNC: 15.5 G/DL
HIV1+2 AB SPEC QL IA.RAPID: NONREACTIVE
IMM GRANULOCYTES NFR BLD AUTO: 0.4 %
LYMPHOCYTES # BLD AUTO: 1.72 K/UL
LYMPHOCYTES NFR BLD AUTO: 31.9 %
MAN DIFF?: NORMAL
MCHC RBC-ENTMCNC: 29 PG
MCHC RBC-ENTMCNC: 31 GM/DL
MCV RBC AUTO: 93.6 FL
MONOCYTES # BLD AUTO: 0.42 K/UL
MONOCYTES NFR BLD AUTO: 7.8 %
NEUTROPHILS # BLD AUTO: 3.2 K/UL
NEUTROPHILS NFR BLD AUTO: 59.1 %
PLATELET # BLD AUTO: 147 K/UL
POTASSIUM SERPL-SCNC: 4.3 MMOL/L
PROT SERPL-MCNC: 5.9 G/DL
PSA SERPL-MCNC: 1.94 NG/ML
RBC # BLD: 5.34 M/UL
RBC # FLD: 14.6 %
SODIUM SERPL-SCNC: 144 MMOL/L
WBC # FLD AUTO: 5.4 K/UL

## 2022-09-07 LAB — IL6 SERPL-MCNC: 16.6 PG/ML

## 2022-09-08 LAB
CHOLEST SERPL-MCNC: 168 MG/DL
HDLC SERPL-MCNC: 70 MG/DL
LDLC SERPL CALC-MCNC: 71 MG/DL
NONHDLC SERPL-MCNC: 98 MG/DL
TRIGL SERPL-MCNC: 132 MG/DL

## 2022-09-09 LAB
TESTOST FREE SERPL-MCNC: 6.7 PG/ML
TESTOST SERPL-MCNC: 848 NG/DL

## 2022-09-16 ENCOUNTER — LABORATORY RESULT (OUTPATIENT)
Age: 67
End: 2022-09-16

## 2022-09-19 ENCOUNTER — APPOINTMENT (OUTPATIENT)
Dept: NUCLEAR MEDICINE | Facility: HOSPITAL | Age: 67
End: 2022-09-19

## 2022-09-19 ENCOUNTER — RESULT REVIEW (OUTPATIENT)
Age: 67
End: 2022-09-19

## 2022-09-19 ENCOUNTER — OUTPATIENT (OUTPATIENT)
Dept: OUTPATIENT SERVICES | Facility: HOSPITAL | Age: 67
LOS: 1 days | End: 2022-09-19
Payer: COMMERCIAL

## 2022-09-19 LAB
GLUCOSE BLDC GLUCOMTR-MCNC: 86 MG/DL — SIGNIFICANT CHANGE UP (ref 70–99)
IGG SUBSET TOTAL IGG: 495 MG/DL
IGG1 SER-MCNC: 292 MG/DL
IGG2 SER-MCNC: 77 MG/DL
IGG3 SER-MCNC: 23 MG/DL
IGG4 SER-MCNC: 5 MG/DL

## 2022-09-19 PROCEDURE — 78815 PET IMAGE W/CT SKULL-THIGH: CPT

## 2022-09-19 PROCEDURE — 82962 GLUCOSE BLOOD TEST: CPT

## 2022-09-19 PROCEDURE — A9552: CPT

## 2022-09-19 PROCEDURE — 78815 PET IMAGE W/CT SKULL-THIGH: CPT | Mod: 26,PS

## 2022-09-20 ENCOUNTER — RESULT REVIEW (OUTPATIENT)
Age: 67
End: 2022-09-20

## 2022-09-20 ENCOUNTER — NON-APPOINTMENT (OUTPATIENT)
Age: 67
End: 2022-09-20

## 2022-09-20 ENCOUNTER — OUTPATIENT (OUTPATIENT)
Dept: OUTPATIENT SERVICES | Facility: HOSPITAL | Age: 67
LOS: 1 days | End: 2022-09-20
Payer: COMMERCIAL

## 2022-09-20 DIAGNOSIS — R94.8 ABNORMAL RESULTS OF FUNCTION STUDIES OF OTHER ORGANS AND SYSTEMS: ICD-10-CM

## 2022-09-20 DIAGNOSIS — M89.9 DISORDER OF BONE, UNSPECIFIED: ICD-10-CM

## 2022-09-20 PROCEDURE — 72148 MRI LUMBAR SPINE W/O DYE: CPT | Mod: 26

## 2022-09-20 PROCEDURE — 72148 MRI LUMBAR SPINE W/O DYE: CPT

## 2022-09-21 ENCOUNTER — FORM ENCOUNTER (OUTPATIENT)
Age: 67
End: 2022-09-21

## 2022-09-22 ENCOUNTER — FORM ENCOUNTER (OUTPATIENT)
Age: 67
End: 2022-09-22

## 2022-09-22 LAB
HLA METHODOLOGY: NORMAL
HLA-B ALLELE 1: NORMAL
HLA-B ALLELE 2: NORMAL

## 2022-09-23 ENCOUNTER — OUTPATIENT (OUTPATIENT)
Dept: OUTPATIENT SERVICES | Facility: HOSPITAL | Age: 67
LOS: 1 days | End: 2022-09-23
Payer: COMMERCIAL

## 2022-09-23 ENCOUNTER — APPOINTMENT (OUTPATIENT)
Dept: INFUSION THERAPY | Facility: CLINIC | Age: 67
End: 2022-09-23

## 2022-09-23 VITALS
RESPIRATION RATE: 18 BRPM | SYSTOLIC BLOOD PRESSURE: 132 MMHG | HEART RATE: 70 BPM | DIASTOLIC BLOOD PRESSURE: 78 MMHG | TEMPERATURE: 98 F | OXYGEN SATURATION: 99 %

## 2022-09-23 DIAGNOSIS — D80.3 SELECTIVE DEFICIENCY OF IMMUNOGLOBULIN G [IGG] SUBCLASSES: ICD-10-CM

## 2022-09-23 PROCEDURE — 96366 THER/PROPH/DIAG IV INF ADDON: CPT

## 2022-09-23 PROCEDURE — 96365 THER/PROPH/DIAG IV INF INIT: CPT

## 2022-09-23 RX ORDER — DIPHENHYDRAMINE HCL 50 MG
25 CAPSULE ORAL ONCE
Refills: 0 | Status: COMPLETED | OUTPATIENT
Start: 2022-09-23 | End: 2022-09-23

## 2022-09-23 RX ORDER — ACETAMINOPHEN 500 MG
650 TABLET ORAL ONCE
Refills: 0 | Status: COMPLETED | OUTPATIENT
Start: 2022-09-23 | End: 2022-09-23

## 2022-09-23 RX ORDER — IMMUNE GLOBULIN (HUMAN) 10 G/100ML
15 INJECTION INTRAVENOUS; SUBCUTANEOUS ONCE
Refills: 0 | Status: COMPLETED | OUTPATIENT
Start: 2022-09-23 | End: 2022-09-23

## 2022-09-23 RX ADMIN — IMMUNE GLOBULIN (HUMAN) 75 GRAM(S): 10 INJECTION INTRAVENOUS; SUBCUTANEOUS at 11:18

## 2022-09-23 RX ADMIN — Medication 650 MILLIGRAM(S): at 11:18

## 2022-09-23 RX ADMIN — Medication 25 MILLIGRAM(S): at 11:18

## 2022-09-23 RX ADMIN — IMMUNE GLOBULIN (HUMAN) 15 GRAM(S): 10 INJECTION INTRAVENOUS; SUBCUTANEOUS at 12:59

## 2022-10-03 ENCOUNTER — NON-APPOINTMENT (OUTPATIENT)
Age: 67
End: 2022-10-03

## 2022-10-05 ENCOUNTER — LABORATORY RESULT (OUTPATIENT)
Age: 67
End: 2022-10-05

## 2022-10-06 LAB
ALBUMIN SERPL ELPH-MCNC: 4.5 G/DL
ALP BLD-CCNC: 75 U/L
ALT SERPL-CCNC: 12 U/L
ANION GAP SERPL CALC-SCNC: 19 MMOL/L
AST SERPL-CCNC: 12 U/L
B2 MICROGLOB SERPL-MCNC: 1.7 MG/L
BILIRUB SERPL-MCNC: 0.2 MG/DL
BUN SERPL-MCNC: 9 MG/DL
CALCIUM SERPL-MCNC: 9.4 MG/DL
CHLORIDE SERPL-SCNC: 100 MMOL/L
CHOLEST SERPL-MCNC: 213 MG/DL
CO2 SERPL-SCNC: 22 MMOL/L
CREAT SERPL-MCNC: 0.68 MG/DL
CRP SERPL-MCNC: 17 MG/L
EGFR: 101 ML/MIN/1.73M2
ERYTHROCYTE [SEDIMENTATION RATE] IN BLOOD BY WESTERGREN METHOD: 21 MM/HR
GLUCOSE SERPL-MCNC: 35 MG/DL
HDLC SERPL-MCNC: 64 MG/DL
LDLC SERPL CALC-MCNC: 119 MG/DL
NONHDLC SERPL-MCNC: 149 MG/DL
POTASSIUM SERPL-SCNC: 4.5 MMOL/L
PROT SERPL-MCNC: 7.1 G/DL
SODIUM SERPL-SCNC: 142 MMOL/L
TRIGL SERPL-MCNC: 153 MG/DL
VZV AB TITR SER: POSITIVE
VZV IGG SER IF-ACNC: 3229 INDEX

## 2022-10-07 LAB
C3 SERPL-MCNC: 89 MG/DL
C4 SERPL-MCNC: 13 MG/DL
CD3 CELLS # BLD: 1354 /UL
CD3 CELLS NFR BLD: 93 %
CD3+CD4+ CELLS # BLD: 453 /UL
CD3+CD4+ CELLS NFR BLD: 31 %
CD3+CD4+ CELLS/CD3+CD8+ CLL SPEC: 0.51 RATIO
CD3+CD8+ CELLS # SPEC: 888 /UL
CD3+CD8+ CELLS NFR BLD: 61 %

## 2022-10-09 ENCOUNTER — FORM ENCOUNTER (OUTPATIENT)
Age: 67
End: 2022-10-09

## 2022-10-09 NOTE — ED ADULT NURSE NOTE - NS ED NOTE  TALK SOMEONE YN
No
When discharged, take only medications prescribed as instructed by your hospital provider    Do not stop or change any medications until you discuss changes with your own prescriber    Do not take any other medications, including left over medications from before your admission, over the counter medications or herbal supplements, unless you discuss with your own provider

## 2022-10-10 LAB
B2 GLYCOPROT1 IGA SERPL IA-ACNC: 22.3 SAU
B2 GLYCOPROT1 IGG SER-ACNC: <5 SGU
B2 GLYCOPROT1 IGM SER-ACNC: <5 SMU
CRYOGLOB SERPL-MCNC: NEGATIVE
DEPRECATED CARDIOLIPIN IGA SER: <5 APL
GBM AB TITR SER IF: <0.2

## 2022-10-11 LAB
DEPRECATED S PNEUM 1 IGG SER-MCNC: 11.9 MCG/ML
DEPRECATED S PNEUM12 AB SER-ACNC: 3.1 MCG/ML
DEPRECATED S PNEUM14 AB SER-ACNC: 1.1 MCG/ML
DEPRECATED S PNEUM17 IGG SER IA-MCNC: 8.7 MCG/ML
DEPRECATED S PNEUM18 IGG SER IA-MCNC: <0.4 MCG/ML
DEPRECATED S PNEUM19 IGG SER-MCNC: 0.9 MCG/ML
DEPRECATED S PNEUM19 IGG SER-MCNC: 4.1 MCG/ML
DEPRECATED S PNEUM2 IGG SER-MCNC: 2.5 MCG/ML
DEPRECATED S PNEUM20 IGG SER-MCNC: <0.4 MCG/ML
DEPRECATED S PNEUM22 IGG SER-MCNC: 10.8 MCG/ML
DEPRECATED S PNEUM23 AB SER-ACNC: 14.6 MCG/ML
DEPRECATED S PNEUM3 AB SER-ACNC: 0.6 MCG/ML
DEPRECATED S PNEUM34 IGG SER-MCNC: 2.1 MCG/ML
DEPRECATED S PNEUM4 AB SER-ACNC: <0.4 MCG/ML
DEPRECATED S PNEUM5 IGG SER-MCNC: 6.9 MCG/ML
DEPRECATED S PNEUM6 IGG SER-MCNC: 1.6 MCG/ML
DEPRECATED S PNEUM7 IGG SER-ACNC: 2.8 MCG/ML
DEPRECATED S PNEUM8 AB SER-ACNC: 1.2 MCG/ML
DEPRECATED S PNEUM9 AB SER-ACNC: NORMAL MCG/ML
DEPRECATED S PNEUM9 IGG SER-MCNC: 1.7 MCG/ML
M PROTEIN SPEC IFE-MCNC: NORMAL
STREPTOCOCCUS PNEUMONIAE SEROTYPE 11A: 1.4 MCG/ML
STREPTOCOCCUS PNEUMONIAE SEROTYPE 15B: 1.5 MCG/ML
STREPTOCOCCUS PNEUMONIAE SEROTYPE 33F: 2 MCG/ML

## 2022-10-11 RX ORDER — NIRMATRELVIR AND RITONAVIR 300-100 MG
20 X 150 MG & KIT ORAL
Qty: 10 | Refills: 0 | Status: COMPLETED | COMMUNITY
Start: 2022-09-30 | End: 2022-10-11

## 2022-10-17 LAB
ANA SER IF-ACNC: NEGATIVE
C DIPHTHERIAE AB SER QL: 0.49 IU/ML
C TETANI IGG SER-ACNC: 2.44 IU/ML
IL6 SERPL-MCNC: 9.3 PG/ML

## 2022-11-08 ENCOUNTER — RESULT REVIEW (OUTPATIENT)
Age: 67
End: 2022-11-08

## 2022-11-08 ENCOUNTER — OUTPATIENT (OUTPATIENT)
Dept: OUTPATIENT SERVICES | Facility: HOSPITAL | Age: 67
LOS: 1 days | End: 2022-11-08

## 2022-11-08 ENCOUNTER — APPOINTMENT (OUTPATIENT)
Dept: RADIOLOGY | Facility: CLINIC | Age: 67
End: 2022-11-08

## 2022-11-08 ENCOUNTER — APPOINTMENT (OUTPATIENT)
Dept: ULTRASOUND IMAGING | Facility: CLINIC | Age: 67
End: 2022-11-08

## 2022-11-08 PROCEDURE — 77080 DXA BONE DENSITY AXIAL: CPT | Mod: 26

## 2022-11-08 PROCEDURE — 93998 UNLISTD NONINVAS VASC DX STD: CPT | Mod: 26

## 2022-11-15 ENCOUNTER — APPOINTMENT (OUTPATIENT)
Dept: OTOLARYNGOLOGY | Facility: CLINIC | Age: 67
End: 2022-11-15

## 2022-11-15 VITALS
HEART RATE: 85 BPM | TEMPERATURE: 97.3 F | OXYGEN SATURATION: 97 % | SYSTOLIC BLOOD PRESSURE: 125 MMHG | DIASTOLIC BLOOD PRESSURE: 87 MMHG | RESPIRATION RATE: 16 BRPM

## 2022-11-15 DIAGNOSIS — E78.5 HYPERLIPIDEMIA, UNSPECIFIED: ICD-10-CM

## 2022-11-15 DIAGNOSIS — R04.0 EPISTAXIS: ICD-10-CM

## 2022-11-15 DIAGNOSIS — E55.9 VITAMIN D DEFICIENCY, UNSPECIFIED: ICD-10-CM

## 2022-11-15 PROCEDURE — 99214 OFFICE O/P EST MOD 30 MIN: CPT | Mod: 25

## 2022-11-15 PROCEDURE — 30903 CONTROL OF NOSEBLEED: CPT

## 2022-11-15 NOTE — PHYSICAL EXAM
[Normal] : no rashes [de-identified] : Acute spontaneous epistaxis Rt side, no hx of trauma.\par scoped and packed [de-identified] : Bleeding Rt side cauterized w silver nortite and packed

## 2022-11-15 NOTE — PROCEDURE
[Image(s) Captured] : image(s) captured and filed [Debridement] : debridement  [Epistaxis] : evaluation of epistaxis [Topical Lidocaine] : topical lidocaine [Oxymetazoline HCl] : oxymetazoline HCl [Rigid Endoscope] : examined with a rigid endoscope [Serial Number: ___] : Serial Number: [unfilled] [Severe] : severe [Rt] : on the right [Removed] : which was removed [FreeTextEntry1] :  Laser Assisted Control of Epistaxis ( Diode Laser ) [FreeTextEntry3] :  Laser Assisted Control of Epistaxis  ( Diode Laser )

## 2022-11-15 NOTE — HISTORY OF PRESENT ILLNESS
[de-identified] : 67 years old male patient with history of right sided epistaxis.  Patient is present today in the office with Right sided epistaxis.  Laser Assisted Control of Epistaxis.  Bacitracin ointment was inserted into patient nostrils for lubrication

## 2022-11-16 LAB
25(OH)D3 SERPL-MCNC: 88.8 NG/ML
ALBUMIN SERPL ELPH-MCNC: 3.9 G/DL
ALP BLD-CCNC: 50 U/L
ALT SERPL-CCNC: 29 U/L
ANION GAP SERPL CALC-SCNC: 11 MMOL/L
APTT BLD: 29 SEC
AST SERPL-CCNC: 39 U/L
BASOPHILS # BLD AUTO: 0.02 K/UL
BASOPHILS NFR BLD AUTO: 0.3 %
BILIRUB SERPL-MCNC: 0.4 MG/DL
BUN SERPL-MCNC: 21 MG/DL
CALCIUM SERPL-MCNC: 9.3 MG/DL
CHLORIDE SERPL-SCNC: 104 MMOL/L
CHOLEST SERPL-MCNC: 182 MG/DL
CO2 SERPL-SCNC: 23 MMOL/L
CREAT SERPL-MCNC: 1.34 MG/DL
CRP SERPL-MCNC: <3 MG/L
EGFR: 58 ML/MIN/1.73M2
EOSINOPHIL # BLD AUTO: 0.01 K/UL
EOSINOPHIL NFR BLD AUTO: 0.2 %
ERYTHROCYTE [SEDIMENTATION RATE] IN BLOOD BY WESTERGREN METHOD: 31 MM/HR
ESTIMATED AVERAGE GLUCOSE: 114 MG/DL
GLUCOSE SERPL-MCNC: 91 MG/DL
HBA1C MFR BLD HPLC: 5.6 %
HCT VFR BLD CALC: 47.5 %
HDLC SERPL-MCNC: 80 MG/DL
HGB BLD-MCNC: 15.1 G/DL
IMM GRANULOCYTES NFR BLD AUTO: 0.3 %
INR PPP: 0.89 RATIO
LDLC SERPL CALC-MCNC: 87 MG/DL
LYMPHOCYTES # BLD AUTO: 1.37 K/UL
LYMPHOCYTES NFR BLD AUTO: 22.9 %
MAN DIFF?: NORMAL
MCHC RBC-ENTMCNC: 29.2 PG
MCHC RBC-ENTMCNC: 31.8 GM/DL
MCV RBC AUTO: 91.9 FL
MONOCYTES # BLD AUTO: 0.37 K/UL
MONOCYTES NFR BLD AUTO: 6.2 %
NEUTROPHILS # BLD AUTO: 4.18 K/UL
NEUTROPHILS NFR BLD AUTO: 70.1 %
NONHDLC SERPL-MCNC: 102 MG/DL
PLATELET # BLD AUTO: 159 K/UL
POTASSIUM SERPL-SCNC: 4.6 MMOL/L
PROT SERPL-MCNC: 6.7 G/DL
PSA SERPL-MCNC: 2.87 NG/ML
PT BLD: 10.3 SEC
RBC # BLD: 5.17 M/UL
RBC # FLD: 15.5 %
SODIUM SERPL-SCNC: 138 MMOL/L
TRIGL SERPL-MCNC: 76 MG/DL
TSH SERPL-ACNC: 1.55 UIU/ML
WBC # FLD AUTO: 5.97 K/UL

## 2022-11-18 LAB
APO LP(A) SERPL-MCNC: 237.2 NMOL/L
LAMOTRIGINE SERPL-MCNC: 1.5 UG/ML

## 2022-11-28 ENCOUNTER — APPOINTMENT (OUTPATIENT)
Dept: INTERNAL MEDICINE | Facility: CLINIC | Age: 67
End: 2022-11-28

## 2022-11-28 DIAGNOSIS — Z23 ENCOUNTER FOR IMMUNIZATION: ICD-10-CM

## 2022-11-28 PROCEDURE — 90662 IIV NO PRSV INCREASED AG IM: CPT

## 2022-11-28 PROCEDURE — G0008: CPT

## 2022-12-21 DIAGNOSIS — E29.1 TESTICULAR HYPOFUNCTION: ICD-10-CM

## 2022-12-21 DIAGNOSIS — G04.81 OTHER ENCEPHALITIS AND ENCEPHALOMYELITIS: ICD-10-CM

## 2022-12-21 DIAGNOSIS — D80.3 SELECTIVE DEFICIENCY OF IMMUNOGLOBULIN G [IGG] SUBCLASSES: ICD-10-CM

## 2022-12-21 DIAGNOSIS — R79.89 OTHER SPECIFIED ABNORMAL FINDINGS OF BLOOD CHEMISTRY: ICD-10-CM

## 2022-12-23 LAB
ALBUMIN SERPL ELPH-MCNC: 3.7 G/DL
ALP BLD-CCNC: 64 U/L
ALT SERPL-CCNC: 16 U/L
ANION GAP SERPL CALC-SCNC: 11 MMOL/L
AST SERPL-CCNC: 21 U/L
BASOPHILS # BLD AUTO: 0.01 K/UL
BASOPHILS NFR BLD AUTO: 0.2 %
BILIRUB SERPL-MCNC: 0.3 MG/DL
BUN SERPL-MCNC: 22 MG/DL
CALCIUM SERPL-MCNC: 9.3 MG/DL
CHLORIDE SERPL-SCNC: 108 MMOL/L
CHOLEST SERPL-MCNC: 157 MG/DL
CO2 SERPL-SCNC: 21 MMOL/L
CREAT SERPL-MCNC: 1.26 MG/DL
CRP SERPL-MCNC: <3 MG/L
EGFR: 62 ML/MIN/1.73M2
EOSINOPHIL # BLD AUTO: 0.01 K/UL
EOSINOPHIL NFR BLD AUTO: 0.2 %
ERYTHROCYTE [SEDIMENTATION RATE] IN BLOOD BY WESTERGREN METHOD: 24 MM/HR
ESTRADIOL SERPL-MCNC: 41 PG/ML
GLUCOSE SERPL-MCNC: 98 MG/DL
HCT VFR BLD CALC: 45.4 %
HDLC SERPL-MCNC: 58 MG/DL
HGB BLD-MCNC: 14.2 G/DL
IMM GRANULOCYTES NFR BLD AUTO: 0.4 %
LDLC SERPL CALC-MCNC: 69 MG/DL
LYMPHOCYTES # BLD AUTO: 1.68 K/UL
LYMPHOCYTES NFR BLD AUTO: 30.3 %
MAN DIFF?: NORMAL
MCHC RBC-ENTMCNC: 28.4 PG
MCHC RBC-ENTMCNC: 31.3 GM/DL
MCV RBC AUTO: 90.8 FL
MONOCYTES # BLD AUTO: 0.46 K/UL
MONOCYTES NFR BLD AUTO: 8.3 %
NEUTROPHILS # BLD AUTO: 3.37 K/UL
NEUTROPHILS NFR BLD AUTO: 60.6 %
NONHDLC SERPL-MCNC: 99 MG/DL
PLATELET # BLD AUTO: 174 K/UL
POTASSIUM SERPL-SCNC: 4.4 MMOL/L
PROT SERPL-MCNC: 6 G/DL
PSA SERPL-MCNC: 2.88 NG/ML
RBC # BLD: 5 M/UL
RBC # FLD: 13.8 %
SODIUM SERPL-SCNC: 141 MMOL/L
TESTOST SERPL-MCNC: >1500 NG/DL
TRIGL SERPL-MCNC: 149 MG/DL
WBC # FLD AUTO: 5.55 K/UL

## 2023-01-02 LAB
A PHAGOCYTOPH IGG TITR SER IF: NORMAL TITER
ALBUMIN MFR SERPL ELPH: 58.6 %
ALBUMIN SERPL-MCNC: 3.5 G/DL
ALBUMIN/GLOB SERPL: 1.4 RATIO
ALPHA1 GLOB MFR SERPL ELPH: 4.4 %
ALPHA1 GLOB SERPL ELPH-MCNC: 0.3 G/DL
ALPHA2 GLOB MFR SERPL ELPH: 9.2 %
ALPHA2 GLOB SERPL ELPH-MCNC: 0.6 G/DL
B BURGDOR AB SER QL IA: NEGATIVE
B MICROTI IGG TITR SER: NORMAL TITER
B-GLOBULIN MFR SERPL ELPH: 10.6 %
B-GLOBULIN SERPL ELPH-MCNC: 0.6 G/DL
DEPRECATED KAPPA LC FREE/LAMBDA SER: 4.21 RATIO
E CHAFFEENSIS IGG TITR SER IF: NORMAL TITER
GAMMA GLOB FLD ELPH-MCNC: 1 G/DL
GAMMA GLOB MFR SERPL ELPH: 17.2 %
IGA SER QL IEP: 119 MG/DL
IGG SER QL IEP: 1089 MG/DL
IGM SER QL IEP: 90 MG/DL
INTERPRETATION SERPL IEP-IMP: NORMAL
KAPPA LC CSF-MCNC: 0.85 MG/DL
KAPPA LC SERPL-MCNC: 3.58 MG/DL
M PROTEIN SPEC IFE-MCNC: NORMAL
PROT SERPL-MCNC: 6 G/DL
PROT SERPL-MCNC: 6 G/DL

## 2023-01-09 ENCOUNTER — RESULT CHARGE (OUTPATIENT)
Age: 68
End: 2023-01-09

## 2023-01-10 ENCOUNTER — APPOINTMENT (OUTPATIENT)
Dept: CT IMAGING | Facility: CLINIC | Age: 68
End: 2023-01-10
Payer: COMMERCIAL

## 2023-01-10 ENCOUNTER — OUTPATIENT (OUTPATIENT)
Dept: OUTPATIENT SERVICES | Facility: HOSPITAL | Age: 68
LOS: 1 days | End: 2023-01-10

## 2023-01-10 ENCOUNTER — APPOINTMENT (OUTPATIENT)
Dept: INTERNAL MEDICINE | Facility: CLINIC | Age: 68
End: 2023-01-10
Payer: COMMERCIAL

## 2023-01-10 ENCOUNTER — NON-APPOINTMENT (OUTPATIENT)
Age: 68
End: 2023-01-10

## 2023-01-10 VITALS
OXYGEN SATURATION: 99 % | TEMPERATURE: 97.3 F | DIASTOLIC BLOOD PRESSURE: 70 MMHG | SYSTOLIC BLOOD PRESSURE: 117 MMHG | WEIGHT: 140 LBS | HEIGHT: 68 IN | BODY MASS INDEX: 21.22 KG/M2 | HEART RATE: 85 BPM

## 2023-01-10 DIAGNOSIS — M31.6 OTHER GIANT CELL ARTERITIS: ICD-10-CM

## 2023-01-10 DIAGNOSIS — I77.6 ARTERITIS, UNSPECIFIED: ICD-10-CM

## 2023-01-10 PROCEDURE — 70486 CT MAXILLOFACIAL W/O DYE: CPT | Mod: 26

## 2023-01-10 PROCEDURE — 93000 ELECTROCARDIOGRAM COMPLETE: CPT

## 2023-01-10 PROCEDURE — 36415 COLL VENOUS BLD VENIPUNCTURE: CPT

## 2023-01-11 LAB
ALBUMIN SERPL ELPH-MCNC: 4 G/DL
ALP BLD-CCNC: 62 U/L
ALT SERPL-CCNC: 36 U/L
ANION GAP SERPL CALC-SCNC: 10 MMOL/L
APPEARANCE: CLEAR
APTT BLD: 27.7 SEC
AST SERPL-CCNC: 29 U/L
BASOPHILS # BLD AUTO: 0.01 K/UL
BASOPHILS NFR BLD AUTO: 0.1 %
BILIRUB SERPL-MCNC: 0.5 MG/DL
BILIRUBIN URINE: NEGATIVE
BLOOD URINE: NEGATIVE
BUN SERPL-MCNC: 21 MG/DL
CALCIUM SERPL-MCNC: 9.6 MG/DL
CHLORIDE SERPL-SCNC: 106 MMOL/L
CO2 SERPL-SCNC: 25 MMOL/L
COLOR: NORMAL
CREAT SERPL-MCNC: 1.28 MG/DL
EGFR: 61 ML/MIN/1.73M2
EOSINOPHIL # BLD AUTO: 0.01 K/UL
EOSINOPHIL NFR BLD AUTO: 0.1 %
GLUCOSE QUALITATIVE U: NEGATIVE
GLUCOSE SERPL-MCNC: 111 MG/DL
HCT VFR BLD CALC: 48.3 %
HGB BLD-MCNC: 14.9 G/DL
IMM GRANULOCYTES NFR BLD AUTO: 0.3 %
INR PPP: 0.9 RATIO
KETONES URINE: NORMAL
LEUKOCYTE ESTERASE URINE: NEGATIVE
LYMPHOCYTES # BLD AUTO: 2.02 K/UL
LYMPHOCYTES NFR BLD AUTO: 28.3 %
MAN DIFF?: NORMAL
MCHC RBC-ENTMCNC: 28 PG
MCHC RBC-ENTMCNC: 30.8 GM/DL
MCV RBC AUTO: 90.6 FL
MONOCYTES # BLD AUTO: 0.23 K/UL
MONOCYTES NFR BLD AUTO: 3.2 %
NEUTROPHILS # BLD AUTO: 4.84 K/UL
NEUTROPHILS NFR BLD AUTO: 68 %
NITRITE URINE: NEGATIVE
PH URINE: 5.5
PLATELET # BLD AUTO: 140 K/UL
POTASSIUM SERPL-SCNC: 4.6 MMOL/L
PROT SERPL-MCNC: 7 G/DL
PROTEIN URINE: NORMAL
PT BLD: 10.4 SEC
RBC # BLD: 5.33 M/UL
RBC # FLD: 14.8 %
SODIUM SERPL-SCNC: 141 MMOL/L
SPECIFIC GRAVITY URINE: 1.03
UROBILINOGEN URINE: NORMAL
WBC # FLD AUTO: 7.13 K/UL

## 2023-01-12 PROBLEM — I77.6 CNS VASCULITIS: Status: ACTIVE | Noted: 2022-03-31

## 2023-01-12 PROBLEM — M31.6: Status: ACTIVE | Noted: 2020-04-30

## 2023-01-12 NOTE — RESULTS/DATA
[] : results reviewed [de-identified] : H/H 14.9/48.3,   [de-identified] : PT/INR 10.4/0.90, APTT 27.7 [de-identified] : BUN/Cr  21/1.28 [de-identified] : NSR, 72 bpm one C

## 2023-01-12 NOTE — HISTORY OF PRESENT ILLNESS
[No Pertinent Cardiac History] : no history of aortic stenosis, atrial fibrillation, coronary artery disease, recent myocardial infarction, or implantable device/pacemaker [No Pertinent Pulmonary History] : no history of asthma, COPD, sleep apnea, or smoking [No Adverse Anesthesia Reaction] : no adverse anesthesia reaction in self or family member [Chronic Kidney Disease] : chronic kidney disease [(Patient denies any chest pain, claudication, dyspnea on exertion, orthopnea, palpitations or syncope)] : Patient denies any chest pain, claudication, dyspnea on exertion, orthopnea, palpitations or syncope [Good (7-10 METs)] : Good (7-10 METs) [Chronic Anticoagulation] : no chronic anticoagulation [Diabetes] : no diabetes [FreeTextEntry1] : sphenopalatine block under Ct navigation  [FreeTextEntry2] : 1/17/23 [FreeTextEntry3] : Dr. Dwight Boo [FreeTextEntry4] : Pt is a 66 y/o M with PMHx of GCA, HLD who presents to the office today for medical clerance for  sphenopalatine block under CT navigation on 1/17/23 with Dwight Boo

## 2023-01-12 NOTE — ASSESSMENT
[High Risk Surgery - Intraperitoneal, Intrathoracic or Supringuinal Vascular Procedures] : High Risk Surgery - Intraperitoneal, Intrathoracic or Supringuinal Vascular Procedures - No (0) [Ischemic Heart Disease] : Ischemic Heart Disease - No (0) [Congestive Heart Failure] : Congestive Heart Failure - No (0) [Prior Cerebrovascular Accident or TIA] : Prior Cerebrovascular Accident or TIA - No (0) [Creatinine >= 2mg/dL (1 Point)] : Creatinine >= 2mg/dL - No (0) [Insulin-dependent Diabetic (1 Point)] : Insulin-dependent Diabetic - No (0) [0] : 0 , RCRI Class: I, Risk of Post-Op Cardiac Complications: 3.9%, 95% CI for Risk Estimate: 2.8% - 5.4% [No Further Testing Recommended] : no further testing recommended [As per surgery] : as per surgery [FreeTextEntry4] : Pt is LOW risk for LOW risk procedure. Pt may proceed with elective surgery.

## 2023-01-13 LAB
CHOLEST SERPL-MCNC: 178 MG/DL
HDLC SERPL-MCNC: 76 MG/DL
LDLC SERPL CALC-MCNC: 85 MG/DL
NONHDLC SERPL-MCNC: 102 MG/DL
TRIGL SERPL-MCNC: 86 MG/DL

## 2023-01-16 ENCOUNTER — TRANSCRIPTION ENCOUNTER (OUTPATIENT)
Age: 68
End: 2023-01-16

## 2023-01-16 VITALS
SYSTOLIC BLOOD PRESSURE: 120 MMHG | OXYGEN SATURATION: 96 % | DIASTOLIC BLOOD PRESSURE: 66 MMHG | RESPIRATION RATE: 18 BRPM | HEIGHT: 68 IN | HEART RATE: 50 BPM | TEMPERATURE: 98 F | WEIGHT: 141.1 LBS

## 2023-01-16 RX ORDER — FOLIC ACID 7.5 MG
1 TABLET ORAL
Qty: 0 | Refills: 0 | DISCHARGE

## 2023-01-16 RX ORDER — RIBAVIRIN 200 MG/1
1 TABLET, COATED ORAL
Qty: 0 | Refills: 0 | DISCHARGE

## 2023-01-16 RX ORDER — NICLOSAMIDE 500 MG
1 TABLET,CHEWABLE ORAL
Qty: 0 | Refills: 0 | DISCHARGE

## 2023-01-16 RX ORDER — HYDROXYCHLOROQUINE SULFATE 200 MG
1 TABLET ORAL
Qty: 0 | Refills: 0 | DISCHARGE

## 2023-01-16 RX ORDER — METHOTREXATE 2.5 MG/1
7 TABLET ORAL
Qty: 0 | Refills: 0 | DISCHARGE

## 2023-01-16 NOTE — ASU PATIENT PROFILE, ADULT - FALL HARM RISK - UNIVERSAL INTERVENTIONS
Bed in lowest position, wheels locked, appropriate side rails in place/Call bell, personal items and telephone in reach/Instruct patient to call for assistance before getting out of bed or chair/[place] to call system/Physically safe environment - no spills, clutter or unnecessary equipment/Purposeful Proactive Rounding/Room/bathroom lighting operational, light cord in reach

## 2023-01-16 NOTE — ASU PATIENT PROFILE, ADULT - REASON FOR ADMISSION, PROFILE
Sphenopalatine block under CT navigation cannulation pterygoid palatal fissure with espiral 1.2%of sphenopalatine ganglilon

## 2023-01-17 ENCOUNTER — TRANSCRIPTION ENCOUNTER (OUTPATIENT)
Age: 68
End: 2023-01-17

## 2023-01-17 ENCOUNTER — OUTPATIENT (OUTPATIENT)
Dept: INPATIENT UNIT | Facility: HOSPITAL | Age: 68
LOS: 1 days | Discharge: HOME | End: 2023-01-17
Payer: COMMERCIAL

## 2023-01-17 VITALS — HEART RATE: 70 BPM | OXYGEN SATURATION: 99 %

## 2023-01-17 DIAGNOSIS — Z98.890 OTHER SPECIFIED POSTPROCEDURAL STATES: Chronic | ICD-10-CM

## 2023-01-17 DIAGNOSIS — J34.2 DEVIATED NASAL SEPTUM: Chronic | ICD-10-CM

## 2023-01-17 LAB
TESTOST FREE SERPL-MCNC: 16.4 PG/ML
TESTOST SERPL-MCNC: 1244 NG/DL

## 2023-01-17 PROCEDURE — 31299 UNLISTED PX ACCESSORY SINUS: CPT

## 2023-01-17 PROCEDURE — 30930 THER FX NASAL INF TURBINATE: CPT

## 2023-01-17 PROCEDURE — 61782 SCAN PROC CRANIAL EXTRA: CPT

## 2023-01-17 PROCEDURE — C1889: CPT

## 2023-01-17 DEVICE — SURGIFLO HEMOSTATIC MATRIX KIT: Type: IMPLANTABLE DEVICE | Status: FUNCTIONAL

## 2023-01-17 RX ORDER — HYDROMORPHONE HYDROCHLORIDE 2 MG/ML
0.5 INJECTION INTRAMUSCULAR; INTRAVENOUS; SUBCUTANEOUS ONCE
Refills: 0 | Status: DISCONTINUED | OUTPATIENT
Start: 2023-01-17 | End: 2023-01-17

## 2023-01-17 RX ORDER — BENZOCAINE AND MENTHOL 5; 1 G/100ML; G/100ML
1 LIQUID ORAL ONCE
Refills: 0 | Status: COMPLETED | OUTPATIENT
Start: 2023-01-17 | End: 2023-01-17

## 2023-01-17 RX ORDER — HYDRALAZINE HCL 50 MG
5 TABLET ORAL ONCE
Refills: 0 | Status: COMPLETED | OUTPATIENT
Start: 2023-01-17 | End: 2023-01-17

## 2023-01-17 RX ORDER — ONDANSETRON 8 MG/1
4 TABLET, FILM COATED ORAL ONCE
Refills: 0 | Status: DISCONTINUED | OUTPATIENT
Start: 2023-01-17 | End: 2023-01-17

## 2023-01-17 RX ORDER — ACETAMINOPHEN 500 MG
650 TABLET ORAL ONCE
Refills: 0 | Status: COMPLETED | OUTPATIENT
Start: 2023-01-17 | End: 2023-01-17

## 2023-01-17 RX ADMIN — HYDROMORPHONE HYDROCHLORIDE 0.5 MILLIGRAM(S): 2 INJECTION INTRAMUSCULAR; INTRAVENOUS; SUBCUTANEOUS at 11:40

## 2023-01-17 RX ADMIN — Medication 5 MILLIGRAM(S): at 10:29

## 2023-01-17 RX ADMIN — BENZOCAINE AND MENTHOL 1 LOZENGE: 5; 1 LIQUID ORAL at 11:03

## 2023-01-17 RX ADMIN — Medication 650 MILLIGRAM(S): at 13:03

## 2023-01-17 RX ADMIN — HYDROMORPHONE HYDROCHLORIDE 0.5 MILLIGRAM(S): 2 INJECTION INTRAMUSCULAR; INTRAVENOUS; SUBCUTANEOUS at 12:12

## 2023-01-17 NOTE — PRE-ANESTHESIA EVALUATION ADULT - NSPROPOSEDPROCEDFT_GEN_ALL_CORE
Left nasal endoscopy, CT navigation Cannulation of left pterygopalantine fissure with injection of either marcaine or exparel mixed with kenalog 40

## 2023-01-17 NOTE — BRIEF OPERATIVE NOTE - NSICDXBRIEFPROCEDURE_GEN_ALL_CORE_FT
PROCEDURES:  Block, facial nerve 17-Jan-2023 09:49:13  Gissell Love  CT guided injection 17-Jan-2023 09:49:28  Gissell Love

## 2023-01-17 NOTE — ASU DISCHARGE PLAN (ADULT/PEDIATRIC) - NS MD DC FALL RISK RISK
For information on Fall & Injury Prevention, visit: [email or web address] OR  [email or web address] OR  https://www.cdc.gov/steadi/patient.html

## 2023-01-17 NOTE — ASU DISCHARGE PLAN (ADULT/PEDIATRIC) - ASU DC SPECIAL INSTRUCTIONSFT
Please call the office to schedule a post operative appointment with Dr. Boo  [phone]  [address] [place] [address], [address] [place] [address]   --  No bending, lifting, straining, exercising, blowing your nose, or utilizing a straw for 2 weeks  If you need to sneeze, please keep your mouth open to avoid excess pressure.  --

## 2023-01-17 NOTE — PROVIDER CONTACT NOTE (OTHER) - SITUATION
c/o CP - left upper chest after dilaudid .5 mg given, VSS bp 148/74 HR 68, 12 lead EKG done, patient requested to see EKG - done, ENT ANTONINO Fritz notified, and said she would get in touch with [facility]

## 2023-01-17 NOTE — PROVIDER CONTACT NOTE (OTHER) - RECOMMENDATIONS
ANTONINO Moore called back and stated Dr. Alston says to watch patient for another hour and then he can go home.

## 2023-01-17 NOTE — BRIEF OPERATIVE NOTE - OPERATION/FINDINGS
Patient with a history of severe cluster headaches with an endoscopic transnasal injection (Exparel 1.3% with 40 Kenalog) of the sphenopalatine fissure under CT navigation performed today. Minimal blood loss. No complications.

## 2023-01-17 NOTE — PROVIDER CONTACT NOTE (OTHER) - ASSESSMENT
Dr. Real at bedside, assessed EKG, and spoke with patient. Wife and  at bedside. Dr. Antonio also spoke with Dr. Alfaro

## 2023-01-17 NOTE — ASU DISCHARGE PLAN (ADULT/PEDIATRIC) - PROVIDER TOKENS
PROVIDER:[TOKEN:[[id number]],SCHEDULEDAPPT:[01/25/2023],SCHEDULEDAPPTTIME:[09:00 AM],ESTABLISHEDPATIENT:[T]]

## 2023-01-17 NOTE — ASU DISCHARGE PLAN (ADULT/PEDIATRIC) - CARE PROVIDER_API CALL
Dwight Boo)  Otolaryngology  [address] [place] [address], [address] [place] [address]  Phone: [phone]  Fax: [phone]  Established Patient  Scheduled Appointment: 01/25/2023 09:00 AM

## 2023-01-30 ENCOUNTER — APPOINTMENT (OUTPATIENT)
Dept: CT IMAGING | Facility: CLINIC | Age: 68
End: 2023-01-30
Payer: COMMERCIAL

## 2023-01-30 ENCOUNTER — OUTPATIENT (OUTPATIENT)
Dept: OUTPATIENT SERVICES | Facility: HOSPITAL | Age: 68
LOS: 1 days | End: 2023-01-30

## 2023-01-30 DIAGNOSIS — Z98.890 OTHER SPECIFIED POSTPROCEDURAL STATES: Chronic | ICD-10-CM

## 2023-01-30 DIAGNOSIS — J34.2 DEVIATED NASAL SEPTUM: Chronic | ICD-10-CM

## 2023-01-30 PROCEDURE — 70496 CT ANGIOGRAPHY HEAD: CPT | Mod: 26

## 2023-02-20 NOTE — CONSULT NOTE ADULT - REASON FOR ADMISSION
[facility] called and spoke to dad (Chandler) and informed him that per Dr Brown, Beto's urine studies are stable. Continue current dose of enalapril, but will be adding ferrous sulfate because her iron was low. Prescription was sent in for this. Dad was agreeable.    Shilpa Presley RN             [facility]

## 2023-02-21 ENCOUNTER — APPOINTMENT (OUTPATIENT)
Dept: NEUROLOGY | Facility: CLINIC | Age: 68
End: 2023-02-21
Payer: COMMERCIAL

## 2023-02-21 VITALS
SYSTOLIC BLOOD PRESSURE: 147 MMHG | BODY MASS INDEX: 21.6 KG/M2 | OXYGEN SATURATION: 98 % | DIASTOLIC BLOOD PRESSURE: 83 MMHG | WEIGHT: 142.5 LBS | TEMPERATURE: 98.7 F | HEART RATE: 59 BPM | HEIGHT: 68 IN

## 2023-02-21 PROCEDURE — XXXXX: CPT | Mod: 1L

## 2023-03-09 ENCOUNTER — LABORATORY RESULT (OUTPATIENT)
Age: 68
End: 2023-03-09

## 2023-03-13 LAB
ALBUMIN SERPL ELPH-MCNC: 3.9 G/DL
ALP BLD-CCNC: 82 U/L
ALT SERPL-CCNC: 23 U/L
AMYLASE/CREAT SERPL: 61 U/L
ANION GAP SERPL CALC-SCNC: 13 MMOL/L
AST SERPL-CCNC: 30 U/L
BASOPHILS # BLD AUTO: 0.01 K/UL
BASOPHILS NFR BLD AUTO: 0.2 %
BILIRUB SERPL-MCNC: 0.4 MG/DL
BUN SERPL-MCNC: 15 MG/DL
CALCIUM SERPL-MCNC: 9.4 MG/DL
CHLORIDE SERPL-SCNC: 102 MMOL/L
CHOLEST SERPL-MCNC: 141 MG/DL
CO2 SERPL-SCNC: 23 MMOL/L
CREAT SERPL-MCNC: 1.29 MG/DL
CRP SERPL-MCNC: <3 MG/L
EGFR: 60 ML/MIN/1.73M2
EOSINOPHIL # BLD AUTO: 0.04 K/UL
EOSINOPHIL NFR BLD AUTO: 0.8 %
ESTRADIOL SERPL-MCNC: 54 PG/ML
GLUCOSE SERPL-MCNC: 87 MG/DL
HCT VFR BLD CALC: 49.2 %
HDLC SERPL-MCNC: 53 MG/DL
HGB BLD-MCNC: 15.4 G/DL
IMM GRANULOCYTES NFR BLD AUTO: 0 %
LDLC SERPL CALC-MCNC: 72 MG/DL
LPL SERPL-CCNC: 26 U/L
LYMPHOCYTES # BLD AUTO: 2.27 K/UL
LYMPHOCYTES NFR BLD AUTO: 47.1 %
MAN DIFF?: NORMAL
MCHC RBC-ENTMCNC: 27.9 PG
MCHC RBC-ENTMCNC: 31.3 GM/DL
MCV RBC AUTO: 89.3 FL
MONOCYTES # BLD AUTO: 0.33 K/UL
MONOCYTES NFR BLD AUTO: 6.8 %
NEUTROPHILS # BLD AUTO: 2.17 K/UL
NEUTROPHILS NFR BLD AUTO: 45.1 %
NONHDLC SERPL-MCNC: 87 MG/DL
PLATELET # BLD AUTO: 131 K/UL
POTASSIUM SERPL-SCNC: 4.5 MMOL/L
PROT SERPL-MCNC: 7 G/DL
PSA SERPL-MCNC: 1.55 NG/ML
RBC # BLD: 5.51 M/UL
RBC # FLD: 14.9 %
SODIUM SERPL-SCNC: 139 MMOL/L
TESTOST FREE SERPL-MCNC: 30.9 PG/ML
TESTOST SERPL-MCNC: >1500 NG/DL
TRIGL SERPL-MCNC: 79 MG/DL
WBC # FLD AUTO: 4.82 K/UL

## 2023-03-15 LAB
APO LP(A) SERPL-MCNC: 271.2 NMOL/L
IGG SUBSET TOTAL IGG: 1795 MG/DL
IGG1 SER-MCNC: 942 MG/DL
IGG2 SER-MCNC: 546 MG/DL
IGG3 SER-MCNC: 69 MG/DL
IGG4 SER-MCNC: 39 MG/DL

## 2023-03-23 LAB — IGE AB SERPL QL: 46 NG/ML

## 2023-03-31 ENCOUNTER — RX RENEWAL (OUTPATIENT)
Age: 68
End: 2023-03-31

## 2023-04-19 ENCOUNTER — RX RENEWAL (OUTPATIENT)
Age: 68
End: 2023-04-19

## 2023-04-20 LAB
ALBUMIN SERPL ELPH-MCNC: 4.2 G/DL
ALP BLD-CCNC: 72 U/L
ALT SERPL-CCNC: 22 U/L
ANION GAP SERPL CALC-SCNC: 10 MMOL/L
AST SERPL-CCNC: 21 U/L
BASOPHILS # BLD AUTO: 0 K/UL
BASOPHILS NFR BLD AUTO: 0 %
BILIRUB SERPL-MCNC: 0.5 MG/DL
BUN SERPL-MCNC: 23 MG/DL
CALCIUM SERPL-MCNC: 9.5 MG/DL
CHLORIDE SERPL-SCNC: 105 MMOL/L
CHOLEST SERPL-MCNC: 173 MG/DL
CO2 SERPL-SCNC: 24 MMOL/L
CREAT SERPL-MCNC: 1.28 MG/DL
CRP SERPL-MCNC: <3 MG/L
EGFR: 61 ML/MIN/1.73M2
EOSINOPHIL # BLD AUTO: 0 K/UL
EOSINOPHIL NFR BLD AUTO: 0 %
ERYTHROCYTE [SEDIMENTATION RATE] IN BLOOD BY WESTERGREN METHOD: 31 MM/HR
ESTIMATED AVERAGE GLUCOSE: 114 MG/DL
ESTRADIOL SERPL-MCNC: 11 PG/ML
GLUCOSE SERPL-MCNC: 137 MG/DL
HBA1C MFR BLD HPLC: 5.6 %
HCT VFR BLD CALC: 47.2 %
HDLC SERPL-MCNC: 70 MG/DL
HGB BLD-MCNC: 15 G/DL
IMM GRANULOCYTES NFR BLD AUTO: 0.3 %
LDLC SERPL CALC-MCNC: 86 MG/DL
LYMPHOCYTES # BLD AUTO: 1.82 K/UL
LYMPHOCYTES NFR BLD AUTO: 25.1 %
MAN DIFF?: NORMAL
MCHC RBC-ENTMCNC: 27.7 PG
MCHC RBC-ENTMCNC: 31.8 GM/DL
MCV RBC AUTO: 87.1 FL
MONOCYTES # BLD AUTO: 0.17 K/UL
MONOCYTES NFR BLD AUTO: 2.3 %
NEUTROPHILS # BLD AUTO: 5.24 K/UL
NEUTROPHILS NFR BLD AUTO: 72.3 %
NONHDLC SERPL-MCNC: 104 MG/DL
PLATELET # BLD AUTO: 160 K/UL
POTASSIUM SERPL-SCNC: 4.7 MMOL/L
PROT SERPL-MCNC: 6.9 G/DL
PSA SERPL-MCNC: 1.49 NG/ML
RBC # BLD: 5.42 M/UL
RBC # FLD: 14.6 %
SODIUM SERPL-SCNC: 139 MMOL/L
TESTOST SERPL-MCNC: 267 NG/DL
TRIGL SERPL-MCNC: 86 MG/DL
TSH SERPL-ACNC: 0.79 UIU/ML
WBC # FLD AUTO: 7.25 K/UL

## 2023-04-21 ENCOUNTER — APPOINTMENT (OUTPATIENT)
Dept: OPHTHALMOLOGY | Facility: CLINIC | Age: 68
End: 2023-04-21
Payer: COMMERCIAL

## 2023-04-21 PROCEDURE — ZZZZZ: CPT

## 2023-04-23 ENCOUNTER — NON-APPOINTMENT (OUTPATIENT)
Age: 68
End: 2023-04-23

## 2023-04-24 ENCOUNTER — APPOINTMENT (OUTPATIENT)
Dept: OPHTHALMOLOGY | Facility: CLINIC | Age: 68
End: 2023-04-24
Payer: COMMERCIAL

## 2023-04-24 DIAGNOSIS — R42 DIZZINESS AND GIDDINESS: ICD-10-CM

## 2023-04-24 PROCEDURE — 92020 GONIOSCOPY: CPT

## 2023-04-24 PROCEDURE — 92014 COMPRE OPH EXAM EST PT 1/>: CPT

## 2023-04-24 PROCEDURE — ZZZZZ: CPT

## 2023-04-24 PROCEDURE — 92136 OPHTHALMIC BIOMETRY: CPT

## 2023-04-25 ENCOUNTER — APPOINTMENT (OUTPATIENT)
Dept: INTERNAL MEDICINE | Facility: CLINIC | Age: 68
End: 2023-04-25
Payer: COMMERCIAL

## 2023-04-25 DIAGNOSIS — Z01.818 ENCOUNTER FOR OTHER PREPROCEDURAL EXAMINATION: ICD-10-CM

## 2023-04-25 PROCEDURE — 99213 OFFICE O/P EST LOW 20 MIN: CPT

## 2023-04-29 NOTE — OPERATIVE REPORT - OPERATIVE RPOSRT DETAILS
DATE OF PROCEDURE: 05/02/23    SUREGEON : ADOLFO MEDINA MD      ASSISTANT(S):  MARYLU JORDAN MD    ANESTHESIA:  MAC.    PREOPERATIVE DIAGNOSIS(ES):  Cataract, left eye.    POSTOPERATIVE DIAGNOSIS(ES):  Cataract, left eye.    OPERATION:  Cataract extraction with intraocular lens insertion, left eye.    IMPLANTS:  J&J Eyhance DIB00 17.5 D lens, serial number ***, expiration date ***    COMPLICATIONS:  None.    SPECIMENS:  None.    ESTIMATED BLOOD LOSS: <0.5 cc    DESCRIPTION OF PROCEDURE:  The patient was identified in the holding area.  The risks, benefits, and alternatives of surgery were discussed with the patient at length.  Informed consent was obtained.  The left eye was identified and marked.  The patient was brought to the operating room and placed in the supine position.  The operative eye was prepped and draped in the usual sterile fashion for intraocular surgery, and a lid speculum was then placed underneath the eye.  A sideport blade was used to create a paracentesis.  Preservative-free 1% lidocaine was injected into the anterior chamber. Healon was injected to fill the anterior chamber.    A 2.4 keratome was used to create a temporal clear corneal incision.  A cystotome was used to begin a continuous curvilinear capsulorrhexis which was finished with Utrata forceps.  Hydrodissection was done with BSS, and the lens was noted to rotate freely in the capsular bag.  Phacoemulsification was accomplished using the divide-and-conquer technique without complications. Residual cortical material was removed using single handpiece irrigation and aspiration.  Lidocaine 1% was injected into the anterior chamber.  Healon was then injected into the capsular bag.    A DIB00 17.5 D lens was implanted into the capsular bag and rotated into proper position using a Kuglen hook.  Viscoelastic and heme were removed using irrigation and aspiration along with residual cortical material.  Miochol*** was then injected into the anterior chamber, and the pupil was noted to be round. All wounds were hydrated and found to be watertight.  The lens was centered, and the anterior chamber was deep.  Intraocular pressure was kept in the teens at the end of the case to prevent postoperative bleeding.  No complications were noted.  Topical antibiotics and steroids were applied to the surface of the left eye.  The eyelid speculum was removed.  Maxitrol ointment was applied to the surface of the left eye which was then patched and shielded.    The patient tolerated the procedure well and was brought to the postoperative care unit in stable condition. DATE OF PROCEDURE: 05/02/23    SUREGEON : ADOLFO MEDINA MD      ASSISTANT(S):  MARYLU JORDAN MD    ANESTHESIA:  MAC.    PREOPERATIVE DIAGNOSIS(ES):  Cataract, left eye.    POSTOPERATIVE DIAGNOSIS(ES):  Cataract, left eye.    OPERATION:  Cataract extraction with intraocular lens insertion, left eye.    IMPLANTS:  J&J Eyhance DIB00 17.5 D lens, serial number [id number], expiration date 01/24/2026    COMPLICATIONS:  None.    SPECIMENS:  None.    ESTIMATED BLOOD LOSS: <0.5 cc    DESCRIPTION OF PROCEDURE:  The patient was identified in the holding area.  The risks, benefits, and alternatives of surgery were discussed with the patient at length.  Informed consent was obtained.  The left eye was identified and marked.  The patient was brought to the operating room and placed in the supine position.  The operative eye was prepped and draped in the usual sterile fashion for intraocular surgery, and a lid speculum was then placed underneath the eye.  A sideport blade was used to create a paracentesis.  Preservative-free 1% lidocaine was injected into the anterior chamber. Healon was injected to fill the anterior chamber.    A 2.4 keratome was used to create a temporal clear corneal incision.  A cystotome was used to begin a continuous curvilinear capsulorrhexis which was finished with Utrata forceps.  Hydrodissection was done with BSS, and the lens was noted to rotate freely in the capsular bag.  The lens was prolapsed out of the bag and then chopped. Light phacoemulsification with mostly aspiration was used to remove the soft lens without complications. Residual cortical material was removed using single handpiece irrigation and aspiration.  Lidocaine 1% was injected into the anterior chamber.  Healon was then injected into the capsular bag.    A DIB00 17.5 D lens was implanted into the capsular bag and rotated into proper position using a Kuglen hook.  Viscoelastic and heme were removed using irrigation and aspiration along with residual cortical material.  Miochol was then injected into the anterior chamber, and the pupil was noted to be round. All wounds were hydrated and found to be watertight.  The lens was centered, and the anterior chamber was deep.  Intraocular pressure was kept in the teens at the end of the case to prevent postoperative bleeding.  No complications were noted.  Topical antibiotics and steroids were applied to the surface of the left eye.  The eyelid speculum was removed.  Maxitrol ointment was applied to the surface of the left eye which was then patched and shielded.    The patient tolerated the procedure well and was brought to the postoperative care unit in stable condition.

## 2023-05-01 RX ORDER — TRAMADOL HYDROCHLORIDE 100 MG/1
100 TABLET, COATED ORAL 3 TIMES DAILY
Qty: 90 | Refills: 0 | Status: COMPLETED | COMMUNITY
Start: 2023-03-17 | End: 2023-05-01

## 2023-05-01 RX ORDER — TRAMADOL HYDROCHLORIDE 50 MG/1
50 TABLET, COATED ORAL
Qty: 90 | Refills: 0 | Status: COMPLETED | COMMUNITY
Start: 2019-11-06 | End: 2023-05-01

## 2023-05-01 NOTE — ASU PATIENT PROFILE, ADULT - FALL HARM RISK - UNIVERSAL INTERVENTIONS
Bed in lowest position, wheels locked, appropriate side rails in place/Call bell, personal items and telephone in reach/Instruct patient to call for assistance before getting out of bed or chair/Non-slip footwear when patient is out of bed/Tea to call system/Physically safe environment - no spills, clutter or unnecessary equipment/Purposeful Proactive Rounding/Room/bathroom lighting operational, light cord in reach

## 2023-05-01 NOTE — ASU PATIENT PROFILE, ADULT - NSICDXPASTMEDICALHX_GEN_ALL_CORE_FT
PAST MEDICAL HISTORY:  Aortitis     Autoimmune disease     GCA (giant cell arteritis)     Leukemia      PAST MEDICAL HISTORY:  Aortitis     Autoimmune disease     GCA (giant cell arteritis)     Leukemia     Migraine-cluster headache syndrome

## 2023-05-01 NOTE — ASU PATIENT PROFILE, ADULT - NS PREOP UNDERSTANDS INFO
No solid food/dairy/candy/gum food after midnight, water before 05:30am tomorrow; Patient to bring photo ID/insurance card; dress in comfortable clothes; no jewelries/valuables/contact lens; no smoking/alcohol/recreational drug use tonight; escort to have photo ID; address an call back number was given./yes As per MD request Patient to arrive at 08:30am. No solid food/dairy/candy/gum food after midnight, water before 05:30am tomorrow; Patient to bring photo ID/insurance card; dress in comfortable clothes; no jewelries/valuables/contact lens; no smoking/alcohol/recreational drug use tonight; escort to have photo ID; address an call back number was given./yes

## 2023-05-01 NOTE — ASU PATIENT PROFILE, ADULT - NSICDXPASTSURGICALHX_GEN_ALL_CORE_FT
PAST SURGICAL HISTORY:  Deviated septum     History of surgery B/L shoulder    Sphenopalatine ganglion neuralgia

## 2023-05-01 NOTE — ASU PATIENT PROFILE, ADULT - HOW PATIENT ADDRESSED, PROFILE
CARDIOVASCULAR - ADULT    • Maintains optimal cardiac output and hemodynamic stability Progressing    • Absence of cardiac arrhythmias or at baseline Progressing        GENITOURINARY - ADULT    • Absence of urinary retention Progressing        Impaired Act Rajan

## 2023-05-02 ENCOUNTER — OUTPATIENT (OUTPATIENT)
Dept: OUTPATIENT SERVICES | Facility: HOSPITAL | Age: 68
LOS: 1 days | Discharge: HOME | End: 2023-05-02

## 2023-05-02 ENCOUNTER — APPOINTMENT (OUTPATIENT)
Dept: OPHTHALMOLOGY | Facility: AMBULATORY SURGERY CENTER | Age: 68
End: 2023-05-02
Payer: COMMERCIAL

## 2023-05-02 ENCOUNTER — NON-APPOINTMENT (OUTPATIENT)
Age: 68
End: 2023-05-02

## 2023-05-02 VITALS
HEART RATE: 57 BPM | OXYGEN SATURATION: 98 % | RESPIRATION RATE: 16 BRPM | HEIGHT: 68 IN | TEMPERATURE: 98 F | SYSTOLIC BLOOD PRESSURE: 129 MMHG | WEIGHT: 145.51 LBS | DIASTOLIC BLOOD PRESSURE: 69 MMHG

## 2023-05-02 VITALS
SYSTOLIC BLOOD PRESSURE: 153 MMHG | TEMPERATURE: 98 F | DIASTOLIC BLOOD PRESSURE: 74 MMHG | HEART RATE: 58 BPM | RESPIRATION RATE: 16 BRPM | OXYGEN SATURATION: 98 %

## 2023-05-02 DIAGNOSIS — G90.09 OTHER IDIOPATHIC PERIPHERAL AUTONOMIC NEUROPATHY: Chronic | ICD-10-CM

## 2023-05-02 DIAGNOSIS — J34.2 DEVIATED NASAL SEPTUM: Chronic | ICD-10-CM

## 2023-05-02 DIAGNOSIS — Z98.890 OTHER SPECIFIED POSTPROCEDURAL STATES: Chronic | ICD-10-CM

## 2023-05-02 PROCEDURE — 66984 XCAPSL CTRC RMVL W/O ECP: CPT | Mod: LT

## 2023-05-02 DEVICE — LENS IOL TECNIS EYHANCE DIB00 17.5D
Type: IMPLANTABLE DEVICE | Site: LEFT | Status: NON-FUNCTIONAL
Removed: 2023-05-02

## 2023-05-02 RX ORDER — TROPICAMIDE 1 %
1 DROPS OPHTHALMIC (EYE)
Refills: 0 | Status: COMPLETED | OUTPATIENT
Start: 2023-05-02 | End: 2023-05-02

## 2023-05-02 RX ORDER — CYCLOPENTOLATE HYDROCHLORIDE 10 MG/ML
1 SOLUTION/ DROPS OPHTHALMIC
Refills: 0 | Status: COMPLETED | OUTPATIENT
Start: 2023-05-02 | End: 2023-05-02

## 2023-05-02 RX ORDER — SODIUM CHLORIDE 9 MG/ML
500 INJECTION, SOLUTION INTRAVENOUS
Refills: 0 | Status: DISCONTINUED | OUTPATIENT
Start: 2023-05-02 | End: 2023-05-02

## 2023-05-02 RX ORDER — OXYCODONE HYDROCHLORIDE 5 MG/1
5 TABLET ORAL ONCE
Refills: 0 | Status: DISCONTINUED | OUTPATIENT
Start: 2023-05-02 | End: 2023-05-02

## 2023-05-02 RX ORDER — GABAPENTIN 400 MG/1
1 CAPSULE ORAL
Qty: 0 | Refills: 0 | DISCHARGE

## 2023-05-02 RX ORDER — TRAMADOL HYDROCHLORIDE 50 MG/1
1 TABLET ORAL
Qty: 0 | Refills: 0 | DISCHARGE

## 2023-05-02 RX ORDER — ATOGEPANT 10 MG/1
1 TABLET ORAL
Refills: 0 | DISCHARGE

## 2023-05-02 RX ORDER — OFLOXACIN 0.3 %
1 DROPS OPHTHALMIC (EYE)
Refills: 0 | Status: COMPLETED | OUTPATIENT
Start: 2023-05-02 | End: 2023-05-02

## 2023-05-02 RX ORDER — PHENYLEPHRINE HCL 2.5 %
1 DROPS OPHTHALMIC (EYE)
Refills: 0 | Status: COMPLETED | OUTPATIENT
Start: 2023-05-02 | End: 2023-05-02

## 2023-05-02 RX ORDER — ACETAMINOPHEN 500 MG
650 TABLET ORAL ONCE
Refills: 0 | Status: DISCONTINUED | OUTPATIENT
Start: 2023-05-02 | End: 2023-05-02

## 2023-05-02 RX ADMIN — Medication 1 DROP(S): at 09:04

## 2023-05-02 RX ADMIN — CYCLOPENTOLATE HYDROCHLORIDE 1 DROP(S): 10 SOLUTION/ DROPS OPHTHALMIC at 09:04

## 2023-05-02 RX ADMIN — Medication 1 DROP(S): at 09:14

## 2023-05-02 RX ADMIN — CYCLOPENTOLATE HYDROCHLORIDE 1 DROP(S): 10 SOLUTION/ DROPS OPHTHALMIC at 09:13

## 2023-05-02 RX ADMIN — Medication 1 DROP(S): at 09:25

## 2023-05-02 RX ADMIN — Medication 1 DROP(S): at 09:13

## 2023-05-02 RX ADMIN — CYCLOPENTOLATE HYDROCHLORIDE 1 DROP(S): 10 SOLUTION/ DROPS OPHTHALMIC at 09:25

## 2023-05-03 ENCOUNTER — NON-APPOINTMENT (OUTPATIENT)
Age: 68
End: 2023-05-03

## 2023-05-03 ENCOUNTER — APPOINTMENT (OUTPATIENT)
Dept: OPHTHALMOLOGY | Facility: CLINIC | Age: 68
End: 2023-05-03
Payer: COMMERCIAL

## 2023-05-03 PROCEDURE — 99024 POSTOP FOLLOW-UP VISIT: CPT

## 2023-05-15 PROBLEM — G44.009 CLUSTER HEADACHE SYNDROME, UNSPECIFIED, NOT INTRACTABLE: Chronic | Status: ACTIVE | Noted: 2023-05-02

## 2023-05-25 ENCOUNTER — NON-APPOINTMENT (OUTPATIENT)
Age: 68
End: 2023-05-25

## 2023-05-25 ENCOUNTER — APPOINTMENT (OUTPATIENT)
Dept: OPHTHALMOLOGY | Facility: CLINIC | Age: 68
End: 2023-05-25
Payer: COMMERCIAL

## 2023-05-25 PROCEDURE — 99024 POSTOP FOLLOW-UP VISIT: CPT

## 2023-06-01 RX ORDER — PHENYLEPHRINE HCL 2.5 %
1 DROPS OPHTHALMIC (EYE)
Refills: 0 | Status: DISCONTINUED | OUTPATIENT
Start: 2023-06-06 | End: 2023-06-06

## 2023-06-01 NOTE — OPERATIVE REPORT - OPERATIVE RPOSRT DETAILS
DATE OF PROCEDURE: 06/06/23    SUREGEON : ADOLFO MEDINA MD      ASSISTANT(S):  MARYLU JORDAN MD    ANESTHESIA:  MAC.    PREOPERATIVE DIAGNOSIS(ES):  Cataract, right eye.    POSTOPERATIVE DIAGNOSIS(ES):  Cataract, right eye.    OPERATION:  Cataract extraction with intraocular lens insertion, right eye.    IMPLANTS:  J&J Eyhance DIB00 21.0 D lens, serial number ***, expiration date ***    COMPLICATIONS:  None.    SPECIMENS:  None.    ESTIMATED BLOOD LOSS: <0.5 cc    DESCRIPTION OF PROCEDURE:  The patient was identified in the holding area.  The risks, benefits, and alternatives of surgery were discussed with the patient at length.  Informed consent was obtained.  The right eye was identified and marked.  The patient was brought to the operating room and placed in the supine position.  The operative eye was prepped and draped in the usual sterile fashion for intraocular surgery, and a lid speculum was then placed underneath the eye.  A sideport blade was used to create a paracentesis.  Preservative-free 1% lidocaine was injected into the anterior chamber. Healon was injected to fill the anterior chamber.    A 2.4 keratome was used to create a temporal clear corneal incision.  A cystotome was used to begin a continuous curvilinear capsulorrhexis which was finished with Utrata forceps.  Hydrodissection was done with BSS, and the lens was noted to rotate freely in the capsular bag.  Phacoemulsification was accomplished using the divide-and-conquer technique without complications. Residual cortical material was removed using single handpiece irrigation and aspiration.  Lidocaine 1% was injected into the anterior chamber.  Healon was then injected into the capsular bag.    A DIB00 21.0 D lens was implanted into the capsular bag and rotated into proper position using a Kuglen hook.  Viscoelastic and heme were removed using irrigation and aspiration along with residual cortical material.  Miochol*** was then injected into the anterior chamber, and the pupil was noted to be round. All wounds were hydrated and found to be watertight.  The lens was centered, and the anterior chamber was deep.  Intraocular pressure was kept in the teens at the end of the case to prevent postoperative bleeding.  No complications were noted.  Topical antibiotics and steroids were applied to the surface of the right eye.  The eyelid speculum was removed.  Maxitrol ointment was applied to the surface of the right eye which was then patched and shielded.    The patient tolerated the procedure well and was brought to the postoperative care unit in stable condition. DATE OF PROCEDURE: 06/06/23    SUREGEON : ADOLFO MEDINA MD      ASSISTANT(S):  MARYLU JORDAN MD    ANESTHESIA:  MAC.    PREOPERATIVE DIAGNOSIS(ES):  Cataract, right eye.    POSTOPERATIVE DIAGNOSIS(ES):  Cataract, right eye.    OPERATION:  Cataract extraction with intraocular lens insertion, right eye.    IMPLANTS:  J&J Eyhance DIB00 21.0 D lens, serial number ***, expiration date ***    COMPLICATIONS:  None.    SPECIMENS:  None.    ESTIMATED BLOOD LOSS: <0.5 cc    DESCRIPTION OF PROCEDURE:  The patient was identified in the holding area.  The risks, benefits, and alternatives of surgery were discussed with the patient at length.  Informed consent was obtained.  The right eye was identified and marked.  The patient was brought to the operating room and placed in the supine position.  The operative eye was prepped and draped in the usual sterile fashion for intraocular surgery, and a lid speculum was then placed underneath the eye.  A sideport blade was used to create a paracentesis.  Preservative-free 1% lidocaine was injected into the anterior chamber followed by air and trypan blue to stain the capsule. Healon was injected to remove any residual trypan and fill the anterior chamber.    A 2.4 keratome was used to create a temporal clear corneal incision.  A cystotome was used to begin a continuous curvilinear capsulorrhexis which was finished with Utrata forceps.  Hydrodissection was done with BSS, and the lens was noted to rotate freely in the capsular bag.  Phacoemulsification was accomplished using the divide-and-conquer technique without complications. Residual cortical material was removed using single handpiece irrigation and aspiration.  Lidocaine 1% was injected into the anterior chamber.  Healon was then injected into the capsular bag.    A DIB00 21.0 D lens was implanted into the capsular bag and rotated into proper position using a Kuglen hook.  Viscoelastic and heme were removed using irrigation and aspiration along with residual cortical material.  Miochol*** was then injected into the anterior chamber, and the pupil was noted to be round. All wounds were hydrated and found to be watertight.  The lens was centered, and the anterior chamber was deep.  Intraocular pressure was kept in the teens at the end of the case to prevent postoperative bleeding.  No complications were noted.  Topical antibiotics and steroids were applied to the surface of the right eye.  The eyelid speculum was removed.  Maxitrol ointment was applied to the surface of the right eye which was then patched and shielded.    The patient tolerated the procedure well and was brought to the postoperative care unit in stable condition. DATE OF PROCEDURE: 06/06/23    SUREGEON : ADOLFO MEDINA MD      ASSISTANT(S):  MARYLU JORDAN MD    ANESTHESIA:  MAC.    PREOPERATIVE DIAGNOSIS(ES):  Cataract, right eye.    POSTOPERATIVE DIAGNOSIS(ES):  Cataract, right eye.    OPERATION:  Cataract extraction with intraocular lens insertion, right eye.    IMPLANTS:  J&J Eyhance DIB00 21.0 D lens, serial number [id number], expiration date 04/11/2026    COMPLICATIONS:  None.    SPECIMENS:  None.    ESTIMATED BLOOD LOSS: <0.5 cc    DESCRIPTION OF PROCEDURE:  The patient was identified in the holding area.  The risks, benefits, and alternatives of surgery were discussed with the patient at length.  Informed consent was obtained.  The right eye was identified and marked.  The patient was brought to the operating room and placed in the supine position.  The operative eye was prepped and draped in the usual sterile fashion for intraocular surgery, and a lid speculum was then placed underneath the eye.  A sideport blade was used to create a paracentesis.  Preservative-free 1% lidocaine was injected into the anterior chamber followed by air and trypan blue to stain the capsule. Healon was injected to remove any residual trypan and fill the anterior chamber.    A 2.4 keratome was used to create a temporal clear corneal incision.  A cystotome was used to begin a continuous curvilinear capsulorrhexis which was finished with Utrata forceps.  Hydrodissection was done with BSS, and the lens was noted to rotate freely in the capsular bag.  Phacoemulsification was accomplished using the divide-and-conquer technique without complications. Residual cortical material was removed using single handpiece irrigation and aspiration.  Lidocaine 1% was injected into the anterior chamber.  Healon was then injected into the capsular bag.    A DIB00 21.0 D lens was implanted into the capsular bag and rotated into proper position using a Kuglen hook.  Viscoelastic and heme were removed using irrigation and aspiration along with residual cortical material.  Miochol was then injected into the anterior chamber, and the pupil was noted to be round. All wounds were hydrated and found to be watertight.  The lens was centered, and the anterior chamber was deep.  Intraocular pressure was kept in the teens at the end of the case to prevent postoperative bleeding.  No complications were noted.  Topical antibiotics and steroids were applied to the surface of the right eye.  The eyelid speculum was removed.  Maxitrol ointment was applied to the surface of the right eye which was then patched and shielded.    The patient tolerated the procedure well and was brought to the postoperative care unit in stable condition.

## 2023-06-02 ENCOUNTER — RX RENEWAL (OUTPATIENT)
Age: 68
End: 2023-06-02

## 2023-06-02 ENCOUNTER — NON-APPOINTMENT (OUTPATIENT)
Age: 68
End: 2023-06-02

## 2023-06-05 NOTE — ASU PATIENT PROFILE, ADULT - NS PREOP UNDERSTANDS INFO
As per MD request Patient to arrive at 08:30am. No solid food/dairy/candy/gum food after midnight, water before 05:30am tomorrow; Patient to bring photo ID/insurance card; dress in comfortable clothes; no jewelries/valuables/contact lens; no smoking/alcohol/recreational drug use tonight; escort to have photo ID; address an call back number was given./yes

## 2023-06-05 NOTE — HISTORY OF PRESENT ILLNESS
[No Pertinent Cardiac History] : no history of aortic stenosis, atrial fibrillation, coronary artery disease, recent myocardial infarction, or implantable device/pacemaker [No Pertinent Pulmonary History] : no history of asthma, COPD, sleep apnea, or smoking [No Adverse Anesthesia Reaction] : no adverse anesthesia reaction in self or family member [(Patient denies any chest pain, claudication, dyspnea on exertion, orthopnea, palpitations or syncope)] : Patient denies any chest pain, claudication, dyspnea on exertion, orthopnea, palpitations or syncope [Excellent (>10 METs)] : Excellent (>10 METs) [Chronic Anticoagulation] : no chronic anticoagulation [Chronic Kidney Disease] : no chronic kidney disease [Diabetes] : no diabetes [FreeTextEntry1] : cataract [FreeTextEntry2] : 5/2/2023 [FreeTextEntry3] : Dr. Almaraz [FreeTextEntry4] : h/o cataracts obstructing vision. scheduled for elective surgery.

## 2023-06-05 NOTE — ASU PATIENT PROFILE, ADULT - NSICDXPASTMEDICALHX_GEN_ALL_CORE_FT
PAST MEDICAL HISTORY:  Aortitis     Autoimmune disease     GCA (giant cell arteritis)     Leukemia     Migraine-cluster headache syndrome

## 2023-06-05 NOTE — ASU PATIENT PROFILE, ADULT - NSICDXPASTSURGICALHX_GEN_ALL_CORE_FT
PAST SURGICAL HISTORY:  Deviated septum     History of left cataract extraction     History of surgery B/L shoulder    Sphenopalatine ganglion neuralgia

## 2023-06-06 ENCOUNTER — NON-APPOINTMENT (OUTPATIENT)
Age: 68
End: 2023-06-06

## 2023-06-06 ENCOUNTER — OUTPATIENT (OUTPATIENT)
Dept: OUTPATIENT SERVICES | Facility: HOSPITAL | Age: 68
LOS: 1 days | Discharge: HOME | End: 2023-06-06

## 2023-06-06 ENCOUNTER — APPOINTMENT (OUTPATIENT)
Dept: OPHTHALMOLOGY | Facility: AMBULATORY SURGERY CENTER | Age: 68
End: 2023-06-06
Payer: COMMERCIAL

## 2023-06-06 ENCOUNTER — TRANSCRIPTION ENCOUNTER (OUTPATIENT)
Age: 68
End: 2023-06-06

## 2023-06-06 VITALS
RESPIRATION RATE: 16 BRPM | HEART RATE: 59 BPM | HEIGHT: 68 IN | WEIGHT: 143.52 LBS | TEMPERATURE: 97 F | DIASTOLIC BLOOD PRESSURE: 63 MMHG | OXYGEN SATURATION: 97 % | SYSTOLIC BLOOD PRESSURE: 121 MMHG

## 2023-06-06 VITALS
DIASTOLIC BLOOD PRESSURE: 56 MMHG | RESPIRATION RATE: 16 BRPM | TEMPERATURE: 99 F | SYSTOLIC BLOOD PRESSURE: 107 MMHG | OXYGEN SATURATION: 96 % | HEART RATE: 51 BPM

## 2023-06-06 DIAGNOSIS — J34.2 DEVIATED NASAL SEPTUM: Chronic | ICD-10-CM

## 2023-06-06 DIAGNOSIS — Z98.42 CATARACT EXTRACTION STATUS, LEFT EYE: Chronic | ICD-10-CM

## 2023-06-06 DIAGNOSIS — Z98.890 OTHER SPECIFIED POSTPROCEDURAL STATES: Chronic | ICD-10-CM

## 2023-06-06 DIAGNOSIS — G90.09 OTHER IDIOPATHIC PERIPHERAL AUTONOMIC NEUROPATHY: Chronic | ICD-10-CM

## 2023-06-06 PROCEDURE — 66984 XCAPSL CTRC RMVL W/O ECP: CPT | Mod: RT,79

## 2023-06-06 DEVICE — LENS IOL TECNIS EYHANCE DIB00 21.0D
Type: IMPLANTABLE DEVICE | Site: RIGHT | Status: NON-FUNCTIONAL
Removed: 2023-06-06

## 2023-06-06 RX ORDER — ACETAMINOPHEN 500 MG
650 TABLET ORAL ONCE
Refills: 0 | Status: DISCONTINUED | OUTPATIENT
Start: 2023-06-06 | End: 2023-06-06

## 2023-06-06 RX ORDER — SODIUM CHLORIDE 9 MG/ML
500 INJECTION, SOLUTION INTRAVENOUS
Refills: 0 | Status: DISCONTINUED | OUTPATIENT
Start: 2023-06-06 | End: 2023-06-06

## 2023-06-06 RX ORDER — ACETAMINOPHEN 500 MG
1000 TABLET ORAL ONCE
Refills: 0 | Status: COMPLETED | OUTPATIENT
Start: 2023-06-06 | End: 2023-06-06

## 2023-06-06 RX ORDER — TROPICAMIDE 1 %
1 DROPS OPHTHALMIC (EYE)
Refills: 0 | Status: COMPLETED | OUTPATIENT
Start: 2023-06-06 | End: 2023-06-06

## 2023-06-06 RX ORDER — CYCLOPENTOLATE HYDROCHLORIDE 10 MG/ML
1 SOLUTION/ DROPS OPHTHALMIC
Refills: 0 | Status: COMPLETED | OUTPATIENT
Start: 2023-06-06 | End: 2023-06-06

## 2023-06-06 RX ORDER — OFLOXACIN 0.3 %
1 DROPS OPHTHALMIC (EYE)
Refills: 0 | Status: COMPLETED | OUTPATIENT
Start: 2023-06-06 | End: 2023-06-06

## 2023-06-06 RX ADMIN — Medication 400 MILLIGRAM(S): at 10:58

## 2023-06-06 RX ADMIN — Medication 1 DROP(S): at 09:41

## 2023-06-06 RX ADMIN — CYCLOPENTOLATE HYDROCHLORIDE 1 DROP(S): 10 SOLUTION/ DROPS OPHTHALMIC at 09:14

## 2023-06-06 RX ADMIN — Medication 1 DROP(S): at 09:27

## 2023-06-06 RX ADMIN — Medication 1 DROP(S): at 09:15

## 2023-06-06 RX ADMIN — CYCLOPENTOLATE HYDROCHLORIDE 1 DROP(S): 10 SOLUTION/ DROPS OPHTHALMIC at 09:40

## 2023-06-06 RX ADMIN — Medication 1 DROP(S): at 09:40

## 2023-06-06 RX ADMIN — Medication 1 DROP(S): at 09:26

## 2023-06-06 RX ADMIN — CYCLOPENTOLATE HYDROCHLORIDE 1 DROP(S): 10 SOLUTION/ DROPS OPHTHALMIC at 09:27

## 2023-06-06 NOTE — ASU DISCHARGE PLAN (ADULT/PEDIATRIC) - NS MD DC FALL RISK RISK
For information on Fall & Injury Prevention, visit: [email or web address] OR  [email or web address] OR  https://www.cdc.gov/steadi/patient.html

## 2023-06-06 NOTE — ASU DISCHARGE PLAN (ADULT/PEDIATRIC) - CLICK TO LAUNCH ORM
Price (Do Not Change): 0.00
Detail Level: Simple
Instructions: This plan will send the code FBSD to the PM system.  DO NOT or CHANGE the price.
.

## 2023-06-07 ENCOUNTER — NON-APPOINTMENT (OUTPATIENT)
Age: 68
End: 2023-06-07

## 2023-06-07 ENCOUNTER — APPOINTMENT (OUTPATIENT)
Dept: OPHTHALMOLOGY | Facility: CLINIC | Age: 68
End: 2023-06-07
Payer: COMMERCIAL

## 2023-06-07 PROCEDURE — 99024 POSTOP FOLLOW-UP VISIT: CPT

## 2023-06-08 LAB
ALBUMIN SERPL ELPH-MCNC: 3.8 G/DL
ALP BLD-CCNC: 62 U/L
ALT SERPL-CCNC: 17 U/L
ANION GAP SERPL CALC-SCNC: 8 MMOL/L
AST SERPL-CCNC: 20 U/L
BILIRUB SERPL-MCNC: 0.8 MG/DL
BUN SERPL-MCNC: 21 MG/DL
CALCIUM SERPL-MCNC: 9.1 MG/DL
CHLORIDE SERPL-SCNC: 104 MMOL/L
CHOLEST SERPL-MCNC: 149 MG/DL
CO2 SERPL-SCNC: 25 MMOL/L
CREAT SERPL-MCNC: 1.32 MG/DL
CRP SERPL-MCNC: <3 MG/L
EGFR: 58 ML/MIN/1.73M2
ERYTHROCYTE [SEDIMENTATION RATE] IN BLOOD BY WESTERGREN METHOD: 43 MM/HR
ESTIMATED AVERAGE GLUCOSE: 111 MG/DL
GLUCOSE SERPL-MCNC: 110 MG/DL
HBA1C MFR BLD HPLC: 5.5 %
HDLC SERPL-MCNC: 66 MG/DL
LDLC SERPL CALC-MCNC: 69 MG/DL
NONHDLC SERPL-MCNC: 83 MG/DL
POTASSIUM SERPL-SCNC: 4.1 MMOL/L
PROT SERPL-MCNC: 7.8 G/DL
PSA SERPL-MCNC: 1.38 NG/ML
SODIUM SERPL-SCNC: 138 MMOL/L
TRIGL SERPL-MCNC: 72 MG/DL
TSH SERPL-ACNC: 2.17 UIU/ML

## 2023-06-09 LAB
IL6 SERPL-MCNC: 17.6 PG/ML
TESTOST FREE SERPL-MCNC: 16.9 PG/ML
TESTOST SERPL-MCNC: 1463 NG/DL

## 2023-06-13 ENCOUNTER — APPOINTMENT (OUTPATIENT)
Dept: NEUROLOGY | Facility: CLINIC | Age: 68
End: 2023-06-13
Payer: COMMERCIAL

## 2023-06-13 DIAGNOSIS — G43.519 PERSISTENT MIGRAINE AURA W/OUT CEREBRAL INFARCTION, INTRACTABLE, W/OUT STATUS MIGRAINOSUS: ICD-10-CM

## 2023-06-13 DIAGNOSIS — R41.89 OTHER SYMPTOMS AND SIGNS INVOLVING COGNITIVE FUNCTIONS AND AWARENESS: ICD-10-CM

## 2023-06-13 DIAGNOSIS — G43.909 MIGRAINE, UNSPECIFIED, NOT INTRACTABLE, W/OUT STATUS MIGRAINOSUS: ICD-10-CM

## 2023-06-13 PROCEDURE — 99214 OFFICE O/P EST MOD 30 MIN: CPT

## 2023-06-13 RX ORDER — LASMIDITAN 50 MG/1
50 TABLET ORAL DAILY
Qty: 24 | Refills: 3 | Status: DISCONTINUED | COMMUNITY
Start: 2023-06-13 | End: 2023-06-13

## 2023-06-13 NOTE — PHYSICAL EXAM
[FreeTextEntry1] : General:\par Constitutional:  Sitting comfortably in NAD.\par Psychiatric: well-groomed, appropriate affect - reports being very depressed however.\par Ears, Nose, Throat: no abnormalities, mucus membranes moist\par Neck: supple, large trigger point left upper trapezius\par Extremities: no edema, clubbing or cyanosis\par Skin: no rash or neuro-cutaneous signs \par \par Cognitive:\par Orientation, language, memory and knowledge screens intact.\par \par Cranial Nerves:\par II: SANTINO. III/IV/VI: EOM Full.  VII: Face appears symmetric VIII: Normal to screening\par IX/X: normal phonation  XI: Trapezius Symmetric  XII: Tongue midline\par Motor:\par Power: no pronator drift\par \par Narrow based gait\par

## 2023-06-13 NOTE — HISTORY OF PRESENT ILLNESS
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  Seen in the past for sleep and headaches with a chronic sense of brain fog, last by me Feb 3, 2022, and also Dr. 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  Indocid helpful but not fully effective.  Taking it several times here and there, but makes him sleepy so only really helpful at night.\par \par He does not feel he is functional, reports he is near suicidal.  Feels he had tried so many things but has a lot of side-effects.\par He tried taking a small dose of Reyvow yesterday, about 25mg.  I sent the Rx last year but had not used it until then.  It made him feel drowsy, but when he awoke he felt better.\par \par Emgality on the second month.  Aimovig was not helpful.  Has not tried Ajovy.\par \par Wiliing to still consider ketamine infusions, Trintillix, etc that had been discussed by Dr. Najjar in the past.\par \par Verapamil makes him too tired, and he is tapering off of it.\par Tramadol seems to help him, currently 50-25-25.  Indocid daily.\par Considering oxycodone, because it really seems to help, and uses it every two weeks, and is afraid of it.\par \par While on prednisone, his symptoms nearly resolve.  Cataracts replaced.\par But down to 10mg of prednisone, everything returns.  \par \par Cephaly, gammacore was unhelpful.\par \par Topiramate he is considering.\par \par Trigger point injections.  He believes there was some improvement, particularly with tenderness in the posterior aspect of his head.\par \par He is continuing to use gabapentin, allegra and MM.\par He will take a 25mg of indocid if he awakens at 4am, which works great at the time.\par \par In the afternoon, there is extreme fatigue, which forces him to leave.\par Tramadol helps lift his mood, and he is a bit tired initially, but then he feels clear.\par Adderall can sometimes help\par Allegra in the afternoon does not do much.\par Wakix not helpful, made him feel ill.\par Mother may have the same symptoms - responded to antidepressant venlafaxine, but he does not tolerate.\par \par He does not awaken feeling rested.\par \par There was a history of sleep apnea - but his wife no longer notices him snoring or stopping his breathing.\par \par Prior:\par Had intermittent headaches since childhood.\par But about 6 years ago headaches became worse, sharp pain on the left convexity, left eye would become droopy, developed nasal congestion.   Rarely on the right.  The headache is constant for about 4 years.\par Had tried indomethacin, for a few days that helped.  But was worried about long-term use and stopped.  Even PRN it may help.\par Tried verapamil - 240mg CD qhs with some improvement.\par Gabapentin as well.\par Awakens not refreshed.\par Tried botox in 2018 w/ Dr Armstrong, but found it ineffective\par Feels he needs to lay down which may help.\par There is a sensation in the left side of his head, headache develops through the day, around 3-4pm, sometimes left and sometimes diffuse.  \par He reports having some myalgias, at the suboccipital region and forehead regions.\par \par Also c/o Brain fog.  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  Tested for sleep apnea - was positive but now negative.\par Insomnia note.\par \par Now off all neuro-medications.  Is taking CBD, sleeps through the night and awakens feeling a bit more rested.\par \marion Has been seen by rheumatology - PET showed hyperactivity in the aorta and carotids.  Started on actemra.\par Low dose methotrexate and low dose prednisone.  Giant cell arteritis as a diagnosis, and symptoms resolved on PET (never had inflammatory markers).  Radiology now negative.\par \par

## 2023-06-13 NOTE — DISCUSSION/SUMMARY
[FreeTextEntry1] : Impression:\par 1) hemicrania continua\par 2) migraine - on emgality\par 3) brain fog and fatigue - feels better with prednisone and opioids but wary of chronic use of both.\par 4) anorexia\par \par Plan:\par 1) trial of reyvow, used low dose more ferquently\par 2) once reyvow trial over, can move on to trial of trintillix 5mg, can halve-it to start for tolerability\par 3) ketamine is a next choice, namenda, naltrexone, vyepti, ajovy etc are other options

## 2023-06-16 ENCOUNTER — APPOINTMENT (OUTPATIENT)
Dept: OPHTHALMOLOGY | Facility: CLINIC | Age: 68
End: 2023-06-16
Payer: COMMERCIAL

## 2023-06-16 ENCOUNTER — NON-APPOINTMENT (OUTPATIENT)
Age: 68
End: 2023-06-16

## 2023-06-16 PROCEDURE — 92012 INTRM OPH EXAM EST PATIENT: CPT | Mod: 24

## 2023-06-26 LAB — INTERLEUKIN 1-BETA: <3.9 PG/ML

## 2023-06-29 RX ORDER — NIRMATRELVIR AND RITONAVIR 300-100 MG
20 X 150 MG & KIT ORAL
Qty: 30 | Refills: 0 | Status: COMPLETED | COMMUNITY
Start: 2023-05-04 | End: 2023-06-29

## 2023-07-06 DIAGNOSIS — U07.1 COVID-19: ICD-10-CM

## 2023-07-10 NOTE — ED PROVIDER NOTE - CONSTITUTIONAL MOOD
appropriate Metronidazole Counseling:  I discussed with the patient the risks of metronidazole including but not limited to seizures, nausea/vomiting, a metallic taste in the mouth, nausea/vomiting and severe allergy.

## 2023-07-18 ENCOUNTER — NON-APPOINTMENT (OUTPATIENT)
Age: 68
End: 2023-07-18

## 2023-07-19 DIAGNOSIS — G43.909 MIGRAINE, UNSPECIFIED, NOT INTRACTABLE, W/OUT STATUS MIGRAINOSUS: ICD-10-CM

## 2023-07-19 DIAGNOSIS — R53.83 OTHER FATIGUE: ICD-10-CM

## 2023-07-19 RX ORDER — CYANOCOBALAMIN 1000 UG/ML
1000 INJECTION INTRAMUSCULAR; SUBCUTANEOUS
Qty: 0 | Refills: 0 | Status: COMPLETED | OUTPATIENT
Start: 2023-07-19

## 2023-07-20 LAB
ALBUMIN SERPL ELPH-MCNC: 3.7 G/DL
ALP BLD-CCNC: 68 U/L
ALT SERPL-CCNC: 25 U/L
ANION GAP SERPL CALC-SCNC: 9 MMOL/L
AST SERPL-CCNC: 24 U/L
BILIRUB SERPL-MCNC: 0.5 MG/DL
BUN SERPL-MCNC: 33 MG/DL
CALCIUM SERPL-MCNC: 9.1 MG/DL
CHLORIDE SERPL-SCNC: 105 MMOL/L
CHOLEST SERPL-MCNC: 169 MG/DL
CO2 SERPL-SCNC: 24 MMOL/L
CREAT SERPL-MCNC: 1.33 MG/DL
CRP SERPL-MCNC: 5 MG/L
EGFR: 58 ML/MIN/1.73M2
ERYTHROCYTE [SEDIMENTATION RATE] IN BLOOD BY WESTERGREN METHOD: 26 MM/HR
FOLATE SERPL-MCNC: 8 NG/ML
GLUCOSE SERPL-MCNC: 102 MG/DL
HCYS SERPL-MCNC: 15.4 UMOL/L
HDLC SERPL-MCNC: 75 MG/DL
LDLC SERPL CALC-MCNC: 81 MG/DL
NONHDLC SERPL-MCNC: 94 MG/DL
POTASSIUM SERPL-SCNC: 5.1 MMOL/L
PROT SERPL-MCNC: 7.2 G/DL
PSA SERPL-MCNC: 1.36 NG/ML
SODIUM SERPL-SCNC: 137 MMOL/L
TRIGL SERPL-MCNC: 68 MG/DL
VIT B12 SERPL-MCNC: 371 PG/ML

## 2023-07-20 RX ORDER — LAMOTRIGINE 50 MG/1
50 TABLET ORAL TWICE DAILY
Qty: 60 | Refills: 3 | Status: DISCONTINUED | COMMUNITY
Start: 2022-10-11 | End: 2023-07-20

## 2023-07-20 RX ORDER — TADALAFIL 20 MG/1
20 TABLET ORAL
Qty: 30 | Refills: 0 | Status: DISCONTINUED | COMMUNITY
Start: 2020-06-24 | End: 2023-07-20

## 2023-07-20 RX ORDER — BUPROPION HYDROCHLORIDE 150 MG/1
150 TABLET, EXTENDED RELEASE ORAL DAILY
Qty: 30 | Refills: 0 | Status: DISCONTINUED | COMMUNITY
Start: 2022-05-10 | End: 2023-07-20

## 2023-07-20 RX ORDER — OMALIZUMAB 150 MG/ML
150 INJECTION, SOLUTION SUBCUTANEOUS
Qty: 2 | Refills: 6 | Status: DISCONTINUED | COMMUNITY
Start: 2020-12-10 | End: 2023-07-20

## 2023-07-20 RX ORDER — TESTOSTERONE 20.25 MG/1.25G
1.62 GEL, METERED TRANSDERMAL DAILY
Qty: 1 | Refills: 0 | Status: DISCONTINUED | COMMUNITY
Start: 2022-11-21 | End: 2023-07-20

## 2023-07-20 RX ORDER — BUTALBITAL, ACETAMINOPHEN AND CAFFEINE 300; 50; 40 MG/1; MG/1; MG/1
50-300-40 CAPSULE ORAL 3 TIMES DAILY
Qty: 30 | Refills: 0 | Status: DISCONTINUED | COMMUNITY
Start: 2023-05-01 | End: 2023-07-20

## 2023-07-20 RX ORDER — GALCANEZUMAB 120 MG/ML
INJECTION, SOLUTION SUBCUTANEOUS
Qty: 1 | Refills: 4 | Status: DISCONTINUED | COMMUNITY
Start: 2023-04-19 | End: 2023-07-20

## 2023-07-20 RX ORDER — ONDANSETRON 4 MG/1
4 TABLET, ORALLY DISINTEGRATING ORAL EVERY 8 HOURS
Qty: 270 | Refills: 0 | Status: DISCONTINUED | COMMUNITY
Start: 2023-05-31 | End: 2023-07-20

## 2023-07-20 RX ORDER — FLUCONAZOLE 200 MG/1
200 TABLET ORAL
Qty: 15 | Refills: 0 | Status: DISCONTINUED | COMMUNITY
Start: 2022-04-12 | End: 2023-07-20

## 2023-07-20 RX ORDER — GALCANEZUMAB 100 MG/ML
INJECTION, SOLUTION SUBCUTANEOUS
Qty: 3 | Refills: 3 | Status: DISCONTINUED | COMMUNITY
Start: 2023-04-19 | End: 2023-07-20

## 2023-07-20 RX ORDER — DENOSUMAB 60 MG/ML
60 INJECTION SUBCUTANEOUS
Qty: 3 | Refills: 3 | Status: DISCONTINUED | COMMUNITY
Start: 2020-05-13 | End: 2023-07-20

## 2023-07-20 RX ORDER — LEVOMILNACIPRAN HYDROCHLORIDE 20 MG/1
20 CAPSULE, EXTENDED RELEASE ORAL DAILY
Qty: 90 | Refills: 3 | Status: DISCONTINUED | COMMUNITY
Start: 2023-01-31 | End: 2023-07-20

## 2023-07-20 RX ORDER — DEXTROAMPHETAMINE SACCHARATE, AMPHETAMINE ASPARTATE, DEXTROAMPHETAMINE SULFATE AND AMPHETAMINE SULFATE 2.5; 2.5; 2.5; 2.5 MG/1; MG/1; MG/1; MG/1
10 TABLET ORAL
Qty: 60 | Refills: 0 | Status: DISCONTINUED | COMMUNITY
Start: 2022-11-10 | End: 2023-07-20

## 2023-07-20 RX ORDER — BLOOD SUGAR DIAGNOSTIC
STRIP MISCELLANEOUS DAILY
Qty: 100 | Refills: 1 | Status: DISCONTINUED | COMMUNITY
Start: 2022-04-05 | End: 2023-07-20

## 2023-07-20 RX ORDER — DROXIDOPA 100 MG/1
CAPSULE ORAL
Qty: 60 | Refills: 3 | Status: DISCONTINUED | OUTPATIENT
Start: 2023-04-24 | End: 2023-07-20

## 2023-07-20 RX ORDER — RIMEGEPANT SULFATE 75 MG/75MG
TABLET, ORALLY DISINTEGRATING ORAL
Qty: 18 | Refills: 3 | Status: DISCONTINUED | COMMUNITY
Start: 2021-07-14 | End: 2023-07-20

## 2023-07-20 RX ORDER — METHYLPREDNISOLONE SODIUM SUCCINATE 1 G/16ML
1000 INJECTION, POWDER, LYOPHILIZED, FOR SOLUTION INTRAMUSCULAR; INTRAVENOUS
Qty: 1 | Refills: 0 | Status: DISCONTINUED | COMMUNITY
Start: 2022-03-31 | End: 2023-07-20

## 2023-07-20 RX ORDER — DICLOFENAC SODIUM 75 MG/1
75 TABLET, DELAYED RELEASE ORAL TWICE DAILY
Qty: 60 | Refills: 3 | Status: DISCONTINUED | COMMUNITY
Start: 2022-10-11 | End: 2023-07-20

## 2023-07-20 RX ORDER — VORTIOXETINE 10 MG/1
10 TABLET, FILM COATED ORAL DAILY
Qty: 30 | Refills: 0 | Status: DISCONTINUED | COMMUNITY
Start: 2023-06-13 | End: 2023-07-20

## 2023-07-20 RX ORDER — PREGABALIN 25 MG/1
25 CAPSULE ORAL 3 TIMES DAILY
Qty: 90 | Refills: 0 | Status: DISCONTINUED | COMMUNITY
Start: 2023-03-30 | End: 2023-07-20

## 2023-07-20 RX ORDER — DEXTROAMPHETAMINE SULFATE 5 MG/1
5 TABLET ORAL TWICE DAILY
Qty: 60 | Refills: 0 | Status: DISCONTINUED | COMMUNITY
Start: 2022-10-11 | End: 2023-07-20

## 2023-07-21 LAB
TESTOST FREE SERPL-MCNC: 5.2 PG/ML
TESTOST SERPL-MCNC: 471 NG/DL

## 2023-07-24 ENCOUNTER — OUTPATIENT (OUTPATIENT)
Dept: OUTPATIENT SERVICES | Facility: HOSPITAL | Age: 68
LOS: 1 days | End: 2023-07-24
Payer: COMMERCIAL

## 2023-07-24 ENCOUNTER — RESULT REVIEW (OUTPATIENT)
Age: 68
End: 2023-07-24

## 2023-07-24 ENCOUNTER — APPOINTMENT (OUTPATIENT)
Dept: NUCLEAR MEDICINE | Facility: HOSPITAL | Age: 68
End: 2023-07-24

## 2023-07-24 DIAGNOSIS — Z98.42 CATARACT EXTRACTION STATUS, LEFT EYE: Chronic | ICD-10-CM

## 2023-07-24 DIAGNOSIS — G90.09 OTHER IDIOPATHIC PERIPHERAL AUTONOMIC NEUROPATHY: Chronic | ICD-10-CM

## 2023-07-24 DIAGNOSIS — Z98.890 OTHER SPECIFIED POSTPROCEDURAL STATES: Chronic | ICD-10-CM

## 2023-07-24 DIAGNOSIS — J34.2 DEVIATED NASAL SEPTUM: Chronic | ICD-10-CM

## 2023-07-24 PROCEDURE — A9552: CPT

## 2023-07-24 PROCEDURE — 78999 UNLISTED MISC PX DX NUC MED: CPT

## 2023-07-24 PROCEDURE — 78999 UNLISTED MISC PX DX NUC MED: CPT | Mod: 26

## 2023-07-24 PROCEDURE — 78608 BRAIN IMAGING (PET): CPT

## 2023-07-24 PROCEDURE — 78608 BRAIN IMAGING (PET): CPT | Mod: 26

## 2023-07-26 ENCOUNTER — FORM ENCOUNTER (OUTPATIENT)
Age: 68
End: 2023-07-26

## 2023-07-28 ENCOUNTER — APPOINTMENT (OUTPATIENT)
Dept: NEUROLOGY | Facility: CLINIC | Age: 68
End: 2023-07-28
Payer: COMMERCIAL

## 2023-07-28 VITALS
HEIGHT: 68 IN | OXYGEN SATURATION: 96 % | HEART RATE: 59 BPM | WEIGHT: 141 LBS | SYSTOLIC BLOOD PRESSURE: 137 MMHG | DIASTOLIC BLOOD PRESSURE: 86 MMHG | BODY MASS INDEX: 21.37 KG/M2 | TEMPERATURE: 97.4 F

## 2023-07-28 VITALS — HEART RATE: 44 BPM

## 2023-07-28 PROCEDURE — 99215 OFFICE O/P EST HI 40 MIN: CPT

## 2023-07-28 RX ORDER — BUTALBITAL, ACETAMINOPHEN AND CAFFEINE 325; 50; 40 MG/1; MG/1; MG/1
50-325-40 TABLET ORAL
Qty: 60 | Refills: 2 | Status: ACTIVE | COMMUNITY
Start: 2023-07-28

## 2023-07-28 RX ORDER — CYANOCOBALAMIN 1000 UG/ML
1000 INJECTION INTRAMUSCULAR; SUBCUTANEOUS
Qty: 0 | Refills: 0 | Status: COMPLETED | OUTPATIENT
Start: 2023-07-28

## 2023-07-28 RX ORDER — ALPRAZOLAM 0.5 MG/1
0.5 TABLET ORAL
Refills: 0 | Status: ACTIVE | COMMUNITY
Start: 2023-07-28

## 2023-07-31 ENCOUNTER — NON-APPOINTMENT (OUTPATIENT)
Age: 68
End: 2023-07-31

## 2023-07-31 DIAGNOSIS — G30.9 ALZHEIMER'S DISEASE, UNSPECIFIED: ICD-10-CM

## 2023-07-31 DIAGNOSIS — R53.83 OTHER FATIGUE: ICD-10-CM

## 2023-07-31 DIAGNOSIS — I95.1 ORTHOSTATIC HYPOTENSION: ICD-10-CM

## 2023-07-31 DIAGNOSIS — F48.8 OTHER SPECIFIED NONPSYCHOTIC MENTAL DISORDERS: ICD-10-CM

## 2023-07-31 DIAGNOSIS — G30.8 OTHER ALZHEIMER'S DISEASE: ICD-10-CM

## 2023-07-31 NOTE — DISCUSSION/SUMMARY
[FreeTextEntry1] : Recurrent severe left sided chronic cluster headaches with intractable nausea, dysautonomic features, cognitive complaints, depression and mood changes as above. It must be noted that depression is not an uncommon co-morbidity with cluster headaches.   Strongly believe the PET abnormality reflects a combination of progressive cluster headaches with depression accounting for cognitive complaints. However, superimposed inflammatory process must be excluded. Therefore, will do CSF analysis and send for neurodegenerative markers, though very unlikely.   Plan:  1. Start Olanzapine 5mg daily to manage nausea and headaches. Medication use, expectations and potential side effects discussed at length to include the possibility of prolonged QT interval.  2. Stop Zofran 3. Continue Nurtec, increase to daily x 3 days then every other day for preventative or prophylactic treatment.  4. Stop Emgalitity due to ineffectiveness 5. Stop Indocin 6. Increase Prednisone to 40mg for today then decrease to 30mg daily x 3 days then will taper accordingly to manage acute severe exacerbation 7. Continue Inderal 40-20mg. Consider changing to Verapamil in the ftreu as it can be more effeective.  8.CSF scheduled for 8/1 at 3pm at  [facility]. Will send for ***Opening pressure*** protein, glucose, cell count, IgG index, oligoclonal bands, myelin basic protein, VGKC, ENC-1 panel, paraneoplastic panel, Neopterin and tau proteins 9. Check serum today for ENC, Neopterin, ESR, CRP, KADE, Neurofilament light chains, cytokines, B2G  Patient will call over the weekend for updates and will follow up in 1 week.

## 2023-07-31 NOTE — HISTORY OF PRESENT ILLNESS
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   At times the headache component is not pronounced but rather cognitive complaints with mood changes, nausea and dysautonomic features are the prevailing symptoms. Other times, such as today the left sided headaches becomes severe with nausea and mood changes are the dominant features together with dysautonomia.   The current headache is described as a poking pain to the left eye with severe left sided pain, particularly to the left frontotemporal, parietal regions averaging 10//10 lasting hours with a few episodes per day. The current headaches is associated with left eye ptosis, left visual blurriness described as a fog covering his left eye, left nostril congestion, stuffiness and nasal drip. He reports intolerance to temperature changes alternating from cold to hot. he also reports intermittent mild bluish discoloration of the left great toe.  Also, the associated nausea is frequent, intense and disabling for which he uses Zofran PRN. Additionally he reports mood changes, irritability and depressive symptomatology  but denies suicidal ideations. He is not interested in seeing psychiatry as he saw someone in the past.   His last MRI brain from 04/04/2023 showed no acute pathology or areas of abnormal enhancement and last brain PET scan from 07/23/2023 showed abnormal hypometabolism particularly pronounced in the parieto-occipital and temporal regions, with extension of abnormal hypometabolism into the orbitofrontal and paramedian frontal lobes, findings overall improved compared to FDG -PET 10/17/2018.   This testing was done to assess the complaints of brain fog that has been frequent and more intense over the past few months together with depressive symptoms. The PET scan is not consistent with a neurodegenerative process.   He has been tried on numerous medication for treatment with either poor efficacy or poor tolerance to include Verapamil, calcitonin gene peptide antagonist such as Atogepant. More recently he has been using Emgality SQ that was started about 3 months ago without any improvement. Of note, prednisone has been very effective in managing headaches and reversing brain fog as well as depressive symptoms. He self started Prednisone 40mg daily about 4 weeks ago with a gradual taper to the current dose of 5mg a day. It must be noted that the tapering of prednisone has resulted in severe relapse and worsenign in cluster headaches ,mood changes, nausea and autonomic features.

## 2023-07-31 NOTE — PHYSICAL EXAM
[FreeTextEntry1] : He appears to be well nourished and well developed. He was in tears secondary to the pain. He feels depressed without suicidal ideations on repeat questioning. \par \par Awake, alert and attentive with clear and fluent speech, memory and language functions. \par \par CN exam was notable for subtle left eye ptosis, PERRLA, EOMI\par Facial movement symmetric, nasal labial folds symmetric. benign fundoscopic exam. \par \par \par coordination: no dysmetria on F-N or H-S testing. \par Motor: No pronator drift noted, full strength with normal tone and bulk. \par \par MSR: 2 and symmetic houghout\par Plantar response flexor bilaterally\par \par Sensation: intact to all modalities\par \par Gait: guarded, steady independnet. Able to tandem walk with effort and romberg was negative

## 2023-08-01 ENCOUNTER — APPOINTMENT (OUTPATIENT)
Dept: INTERVENTIONAL RADIOLOGY/VASCULAR | Facility: HOSPITAL | Age: 68
End: 2023-08-01

## 2023-08-01 ENCOUNTER — RESULT REVIEW (OUTPATIENT)
Age: 68
End: 2023-08-01

## 2023-08-01 ENCOUNTER — OUTPATIENT (OUTPATIENT)
Dept: OUTPATIENT SERVICES | Facility: HOSPITAL | Age: 68
LOS: 1 days | End: 2023-08-01
Payer: COMMERCIAL

## 2023-08-01 DIAGNOSIS — J34.2 DEVIATED NASAL SEPTUM: Chronic | ICD-10-CM

## 2023-08-01 DIAGNOSIS — Z98.890 OTHER SPECIFIED POSTPROCEDURAL STATES: Chronic | ICD-10-CM

## 2023-08-01 DIAGNOSIS — G90.09 OTHER IDIOPATHIC PERIPHERAL AUTONOMIC NEUROPATHY: Chronic | ICD-10-CM

## 2023-08-01 DIAGNOSIS — Z98.42 CATARACT EXTRACTION STATUS, LEFT EYE: Chronic | ICD-10-CM

## 2023-08-01 DIAGNOSIS — R41.3 OTHER AMNESIA: ICD-10-CM

## 2023-08-01 LAB
APPEARANCE CSF: CLEAR — SIGNIFICANT CHANGE UP
APPEARANCE SPUN FLD: COLORLESS — SIGNIFICANT CHANGE UP
COLOR CSF: SIGNIFICANT CHANGE UP
GLUCOSE CSF-MCNC: 67 MG/DL — SIGNIFICANT CHANGE UP (ref 40–70)
NEUTROPHILS # CSF: 0 % — SIGNIFICANT CHANGE UP (ref 0–6)
NRBC NFR CSF: 0 /UL — SIGNIFICANT CHANGE UP (ref 0–5)
PROT CSF-MCNC: 24 MG/DL — SIGNIFICANT CHANGE UP (ref 15–45)
RBC # CSF: 0 /UL — SIGNIFICANT CHANGE UP (ref 0–0)
TUBE TYPE: SIGNIFICANT CHANGE UP

## 2023-08-01 PROCEDURE — 82784 ASSAY IGA/IGD/IGG/IGM EACH: CPT

## 2023-08-01 PROCEDURE — 84394 TOTAL TAU: CPT

## 2023-08-01 PROCEDURE — 82042 OTHER SOURCE ALBUMIN QUAN EA: CPT

## 2023-08-01 PROCEDURE — 86255 FLUORESCENT ANTIBODY SCREEN: CPT

## 2023-08-01 PROCEDURE — 62328 DX LMBR SPI PNXR W/FLUOR/CT: CPT

## 2023-08-01 PROCEDURE — 82040 ASSAY OF SERUM ALBUMIN: CPT

## 2023-08-01 PROCEDURE — 82945 GLUCOSE OTHER FLUID: CPT

## 2023-08-01 PROCEDURE — 86341 ISLET CELL ANTIBODY: CPT

## 2023-08-01 PROCEDURE — 84393 TAU PHOSPHORYLATED EA: CPT

## 2023-08-01 PROCEDURE — 36415 COLL VENOUS BLD VENIPUNCTURE: CPT

## 2023-08-01 PROCEDURE — 83916 OLIGOCLONAL BANDS: CPT

## 2023-08-01 PROCEDURE — 89051 BODY FLUID CELL COUNT: CPT

## 2023-08-01 PROCEDURE — 84157 ASSAY OF PROTEIN OTHER: CPT

## 2023-08-01 RX ORDER — TRAMADOL HYDROCHLORIDE 50 MG/1
50 TABLET, COATED ORAL
Qty: 90 | Refills: 0 | Status: DISCONTINUED | COMMUNITY
Start: 2023-05-01 | End: 2023-08-01

## 2023-08-01 RX ORDER — VORTIOXETINE 10 MG/1
10 TABLET, FILM COATED ORAL
Qty: 30 | Refills: 2 | Status: DISCONTINUED | COMMUNITY
Start: 2022-09-12 | End: 2023-08-01

## 2023-08-03 LAB
ALBUMIN CSF-MCNC: 11.7 MG/DL — LOW (ref 14–25)
ALBUMIN SERPL ELPH-MCNC: 3470 MG/DL — LOW (ref 3500–5200)
IGG CSF-MCNC: 2.1 MG/DL — SIGNIFICANT CHANGE UP
IGG FLD-MCNC: 1243 MG/DL — SIGNIFICANT CHANGE UP (ref 610–1660)
IGG SYNTH RATE SER+CSF CALC-MRATE: -3.7 MG/DAY — SIGNIFICANT CHANGE UP
IGG/ALB CLEAR SER+CSF-RTO: 0.5 — SIGNIFICANT CHANGE UP
IGG/ALB CSF: 0.18 RATIO — SIGNIFICANT CHANGE UP
IGG/ALB SER: 0.36 RATIO — SIGNIFICANT CHANGE UP

## 2023-08-08 LAB — NEOPTERIN SERPL-MCNC: 2.5 NG/ML — HIGH

## 2023-08-09 LAB — OLIGOCLONAL BANDS CSF ELPH-IMP: SIGNIFICANT CHANGE UP

## 2023-08-11 ENCOUNTER — APPOINTMENT (OUTPATIENT)
Dept: NEUROLOGY | Facility: CLINIC | Age: 68
End: 2023-08-11
Payer: COMMERCIAL

## 2023-08-11 VITALS
DIASTOLIC BLOOD PRESSURE: 75 MMHG | SYSTOLIC BLOOD PRESSURE: 132 MMHG | TEMPERATURE: 96.4 F | HEIGHT: 68 IN | WEIGHT: 142 LBS | BODY MASS INDEX: 21.52 KG/M2 | OXYGEN SATURATION: 96 % | HEART RATE: 80 BPM

## 2023-08-11 DIAGNOSIS — R45.89 OTHER SYMPTOMS AND SIGNS INVOLVING EMOTIONAL STATE: ICD-10-CM

## 2023-08-11 LAB
AMPA-R AB CBA, CSF: NEGATIVE — SIGNIFICANT CHANGE UP
AMPHIPHYSIN AB TITR CSF: NEGATIVE — SIGNIFICANT CHANGE UP
CASPR2-IGG CBA, CSF: NEGATIVE — SIGNIFICANT CHANGE UP
CV2 IGG TITR CSF: NEGATIVE — SIGNIFICANT CHANGE UP
DPPX ANTIBODY IFA, CSF: NEGATIVE — SIGNIFICANT CHANGE UP
GABA-B-R AB CBA, CSF: NEGATIVE — SIGNIFICANT CHANGE UP
GAD65 AB CSF-SCNC: 0 NMOL/L — SIGNIFICANT CHANGE UP
GFAP IFA, CSF: NEGATIVE — SIGNIFICANT CHANGE UP
GLIAL NUC TYPE 1 AB TITR CSF: NEGATIVE — SIGNIFICANT CHANGE UP
HU1 AB TITR CSF IF: NEGATIVE — SIGNIFICANT CHANGE UP
HU2 AB TITR CSF IF: NEGATIVE — SIGNIFICANT CHANGE UP
HU3 AB TITR CSF: NEGATIVE — SIGNIFICANT CHANGE UP
IFA NOTES: SIGNIFICANT CHANGE UP
IGLON5 IFA, CSF: NEGATIVE — SIGNIFICANT CHANGE UP
IMMUNOLOGIST REVIEW: SIGNIFICANT CHANGE UP
LGI1-IGG CBA, CSF: NEGATIVE — SIGNIFICANT CHANGE UP
MGLUR1 AB IFA, CSF: NEGATIVE — SIGNIFICANT CHANGE UP
MISCELLANEOUS TEST NAME: SIGNIFICANT CHANGE UP
PCA-TR AB TITR CSF: NEGATIVE — SIGNIFICANT CHANGE UP

## 2023-08-11 PROCEDURE — 99214 OFFICE O/P EST MOD 30 MIN: CPT

## 2023-08-11 NOTE — PHYSICAL EXAM
[FreeTextEntry1] : Awake, alert and attentive with clear and fluent speech, memory and language functions.   CN exam was notable for subtle left eye ptosis, PERRLA, EOMI Facial movement symmetric, nasal labial folds symmetric. benign fundoscopic exam.   coordination: no dysmetria on F-N or H-S testing.  Motor: No pronator drift noted, full strength with normal tone and bulk.   MSR: 2 and symmetic houghout Plantar response flexor bilaterally  Sensation: intact to all modalities  Gait: guarded, steady independnet. Able to tandem walk with effort and romberg was negative

## 2023-08-11 NOTE — ASSESSMENT
[FreeTextEntry1] : Brief visit for medication adjustment. 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 Since his last visit, he has continued to have cluster headaches and depressive symptoms which both have caused some brain fog and difficulty concentrating.   Neopterin (3) and Neurofilament Light Chain (5) results are both slightly elevated which show an inflammatory process vs. axonal degeneration that can be associated with nonspecific degeneration and/or major depressive disorder. CSF results are still pending and will follow up for results next week. No suicidal ideation.   Takes Adderall 5 mg PRN for attention difficulty and Oxycodone 5 mg PRN as prescribed by other provider. Of note, Olanzapine has been effective for HA and nausea but resulted in tremor like activity. Currently on Prednisone 10 mg daily. Of note, Prednisone has been very effective for cluster headache management.  Plan: -Increase Prednisone to 20 mg for another 3 days, then reduce to 10 or 15 mg after telephone discussion -Continue Nurtec 75 mg every other day for headache prophylaxis, refill given -Take Verapamil 40 TID for 5 days, then 80-40-80 for 5 days, then 80 mg TID -Change Olanzapine to 2.5 mg, take 1/2 tablet daily, discontinue if any side effects or tremor like activity -Referral to psychiatry, patient will reach out provider to himself for additional support and medication options  Patient history obtained, counseling, physical examination completed, and results reviewed by Dr. Najjar in 60 minutes.

## 2023-08-11 NOTE — HISTORY OF PRESENT ILLNESS
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 Since his last visit, he has continued to have cluster headaches and depressive symptoms which both have caused some brain fog and difficulty concentrating. He continues off Tramadol, Gabapentin and Zofran. He returns for additional medication adjustment to improve his symptoms.

## 2023-08-23 ENCOUNTER — NON-APPOINTMENT (OUTPATIENT)
Age: 68
End: 2023-08-23

## 2023-08-25 LAB — ADMARK PHOSPHO-TAU/TOTAL-TA RESULT: SIGNIFICANT CHANGE UP

## 2023-09-10 ENCOUNTER — NON-APPOINTMENT (OUTPATIENT)
Age: 68
End: 2023-09-10

## 2023-09-11 ENCOUNTER — APPOINTMENT (OUTPATIENT)
Dept: INTERNAL MEDICINE | Facility: CLINIC | Age: 68
End: 2023-09-11
Payer: COMMERCIAL

## 2023-09-11 ENCOUNTER — NON-APPOINTMENT (OUTPATIENT)
Age: 68
End: 2023-09-11

## 2023-09-11 VITALS
OXYGEN SATURATION: 95 % | SYSTOLIC BLOOD PRESSURE: 149 MMHG | HEIGHT: 68 IN | RESPIRATION RATE: 16 BRPM | DIASTOLIC BLOOD PRESSURE: 83 MMHG | WEIGHT: 142 LBS | HEART RATE: 46 BPM | TEMPERATURE: 98.7 F | BODY MASS INDEX: 21.52 KG/M2

## 2023-09-11 DIAGNOSIS — Z01.818 ENCOUNTER FOR OTHER PREPROCEDURAL EXAMINATION: ICD-10-CM

## 2023-09-11 DIAGNOSIS — Z01.812 ENCOUNTER FOR PREPROCEDURAL LABORATORY EXAMINATION: ICD-10-CM

## 2023-09-11 PROCEDURE — 36415 COLL VENOUS BLD VENIPUNCTURE: CPT

## 2023-09-11 PROCEDURE — 93000 ELECTROCARDIOGRAM COMPLETE: CPT

## 2023-09-11 PROCEDURE — 99213 OFFICE O/P EST LOW 20 MIN: CPT | Mod: 25

## 2023-09-12 LAB
ALBUMIN SERPL ELPH-MCNC: 4.2 G/DL
ALP BLD-CCNC: 47 U/L
ALT SERPL-CCNC: 18 U/L
ANION GAP SERPL CALC-SCNC: 14 MMOL/L
APPEARANCE: CLEAR
APTT BLD: 32.9 SEC
AST SERPL-CCNC: 26 U/L
BACTERIA: NEGATIVE /HPF
BASOPHILS # BLD AUTO: 0.02 K/UL
BASOPHILS NFR BLD AUTO: 0.4 %
BILIRUB SERPL-MCNC: 0.3 MG/DL
BILIRUBIN URINE: NEGATIVE
BLOOD URINE: NEGATIVE
BUN SERPL-MCNC: 21 MG/DL
CALCIUM SERPL-MCNC: 9.6 MG/DL
CAST: 0 /LPF
CHLORIDE SERPL-SCNC: 106 MMOL/L
CO2 SERPL-SCNC: 22 MMOL/L
COLOR: YELLOW
CREAT SERPL-MCNC: 1.39 MG/DL
EGFR: 55 ML/MIN/1.73M2
EOSINOPHIL # BLD AUTO: 0.12 K/UL
EOSINOPHIL NFR BLD AUTO: 2.2 %
EPITHELIAL CELLS: 0 /HPF
GLUCOSE QUALITATIVE U: NEGATIVE MG/DL
GLUCOSE SERPL-MCNC: 87 MG/DL
HCT VFR BLD CALC: 47.5 %
HGB BLD-MCNC: 14.5 G/DL
IMM GRANULOCYTES NFR BLD AUTO: 0.5 %
INR PPP: 0.88 RATIO
KETONES URINE: NEGATIVE MG/DL
LEUKOCYTE ESTERASE URINE: NEGATIVE
LYMPHOCYTES # BLD AUTO: 2.44 K/UL
LYMPHOCYTES NFR BLD AUTO: 44 %
MAN DIFF?: NORMAL
MCHC RBC-ENTMCNC: 28 PG
MCHC RBC-ENTMCNC: 30.5 GM/DL
MCV RBC AUTO: 91.9 FL
MICROSCOPIC-UA: NORMAL
MONOCYTES # BLD AUTO: 0.45 K/UL
MONOCYTES NFR BLD AUTO: 8.1 %
MUCUS: PRESENT
NEUTROPHILS # BLD AUTO: 2.48 K/UL
NEUTROPHILS NFR BLD AUTO: 44.8 %
NITRITE URINE: NEGATIVE
PH URINE: 5.5
PLATELET # BLD AUTO: 180 K/UL
POTASSIUM SERPL-SCNC: 4.9 MMOL/L
PROT SERPL-MCNC: 5.8 G/DL
PROTEIN URINE: NEGATIVE MG/DL
PT BLD: 10 SEC
RBC # BLD: 5.17 M/UL
RBC # FLD: 15.9 %
RED BLOOD CELLS URINE: 1 /HPF
REVIEW: NORMAL
SODIUM SERPL-SCNC: 142 MMOL/L
SPECIFIC GRAVITY URINE: 1.02
UROBILINOGEN URINE: 0.2 MG/DL
WBC # FLD AUTO: 5.54 K/UL
WHITE BLOOD CELLS URINE: 0 /HPF

## 2023-09-21 ENCOUNTER — NON-APPOINTMENT (OUTPATIENT)
Age: 68
End: 2023-09-21

## 2023-09-29 ENCOUNTER — TRANSCRIPTION ENCOUNTER (OUTPATIENT)
Age: 68
End: 2023-09-29

## 2023-09-29 ENCOUNTER — OUTPATIENT (OUTPATIENT)
Dept: OUTPATIENT SERVICES | Facility: HOSPITAL | Age: 68
LOS: 1 days | Discharge: HOME | End: 2023-09-29
Payer: COMMERCIAL

## 2023-09-29 VITALS
SYSTOLIC BLOOD PRESSURE: 121 MMHG | HEART RATE: 58 BPM | WEIGHT: 143.3 LBS | RESPIRATION RATE: 14 BRPM | HEIGHT: 67 IN | TEMPERATURE: 98 F | DIASTOLIC BLOOD PRESSURE: 61 MMHG | OXYGEN SATURATION: 98 %

## 2023-09-29 VITALS
SYSTOLIC BLOOD PRESSURE: 150 MMHG | DIASTOLIC BLOOD PRESSURE: 85 MMHG | TEMPERATURE: 97 F | HEART RATE: 70 BPM | OXYGEN SATURATION: 96 % | RESPIRATION RATE: 12 BRPM

## 2023-09-29 DIAGNOSIS — Z98.890 OTHER SPECIFIED POSTPROCEDURAL STATES: Chronic | ICD-10-CM

## 2023-09-29 DIAGNOSIS — G90.09 OTHER IDIOPATHIC PERIPHERAL AUTONOMIC NEUROPATHY: Chronic | ICD-10-CM

## 2023-09-29 DIAGNOSIS — J34.2 DEVIATED NASAL SEPTUM: Chronic | ICD-10-CM

## 2023-09-29 DIAGNOSIS — Z98.42 CATARACT EXTRACTION STATUS, LEFT EYE: Chronic | ICD-10-CM

## 2023-09-29 PROCEDURE — 90870 ELECTROCONVULSIVE THERAPY: CPT

## 2023-09-29 RX ORDER — SODIUM CHLORIDE 9 MG/ML
500 INJECTION, SOLUTION INTRAVENOUS
Refills: 0 | Status: DISCONTINUED | OUTPATIENT
Start: 2023-09-29 | End: 2023-09-29

## 2023-09-29 RX ORDER — ACETAMINOPHEN 500 MG
650 TABLET ORAL ONCE
Refills: 0 | Status: DISCONTINUED | OUTPATIENT
Start: 2023-09-29 | End: 2023-09-29

## 2023-09-29 RX ORDER — ONDANSETRON 8 MG/1
4 TABLET, FILM COATED ORAL ONCE
Refills: 0 | Status: DISCONTINUED | OUTPATIENT
Start: 2023-09-29 | End: 2023-09-29

## 2023-09-29 NOTE — ASU DISCHARGE PLAN (ADULT/PEDIATRIC) - ASU DC SPECIAL INSTRUCTIONSFT
AMBULATORY ELECTROCONVULSIVE THERAPY (ECT) DISCHARGE INSTRUCTIONS    Condition:  Stable for discharge to home.    1. Activities: Rest the day of your treatment.  It is normal to experience drowsiness and possibly some confusion following ECT and anesthesia.  DO NOT consume alcohol, operative machinery, drive or make important personal, business or legal decisions for at least 24 hours.    2. Diet: Begin with a light meal following ECT and progress as tolerated to a regular diet (ie. resume your normal food intake).    3. Medications: Resume medications as prescribed by your outpatient physicians.  Mild aches or headache may be experienced post treatment.  This is usually relieved  by Tylenol or other non-aspirin medications (take only amounts prescribed by each ).      4. Emergencies:  Please first call your regular outpatient psychiatric provider (ie. psychopharmacologist) for any changes in your psychiatric symptoms.   If you are unable to reach your doctor, you go directly to your local Emergency Room.      5. Routine Follow-Up Information:   Please call the ECT Department if you are unable to make your next appointment or have any questions about your ECT treatments.  You may also contact Gavin Cerrato MD at [email or web address] or [phone] for non-urgent clinical questions about your treatment course.      REMEMBER TO NOT EAT ANYTHING FOR 8 HOURS BEFORE YOUR NEXT TREATMENT.   YOU MAY DRINK ONLY CLEAR LIQUIDS UP TO 3 HOURS BEFORE YOUR TREATMENT (eg. WATER, APPLE JUICE, GINGER ALE).    SOMEONE OVER 18 YEARS OLD MUST ACCOMPANY YOU HOME FROM THE HOSPITAL ON THE DAY OF YOUR TREATMENT.    IF YOU HAVE ANY QUESTIONS ABOUT YOUR NEXT TREATMENT, COVID TESTING, OR EXPIRATION DATES OF YOUR MEDICAL CLEARANCE, PLEASE CONTACT:     ECT Department:  Lonnie “Steven” Constanza [phone] office; [phone] fax    Your next ECT treatment will be on: October 2, 2023 (acute)

## 2023-09-29 NOTE — ASU DISCHARGE PLAN (ADULT/PEDIATRIC) - NS MD DC FALL RISK RISK
For information on Fall & Injury Prevention, visit: [email or web address] OR  [email or web address] OR  https://www.cdc.gov/steadi/patient.html

## 2023-09-29 NOTE — ECT TREATMENT NOTE - NSECTCARDIOCOMMENT_PSY_ALL_CORE
H/o Giant Cell Arteritis of Aorta, wide aorta (45mm), mild aortic insufficiency, h/o ventricular ablation for PVCs

## 2023-09-29 NOTE — ECT TREATMENT NOTE - NSECTCOMMENTS_PSY_ALL_CORE
Continuing acute ECT started at [facility] in [place], [place] (3x RUL ECTs there, last Monday 9/25/23).  Pt seen by me before tx, continues to have depression symptoms.  AOx3, no active SI.  Will continue acute 2x/week ECT.  Pt has capacity, consents obtained.    Continuing acute ECT started at [facility] in [place], [place] (3x RUL ECTs there, last Monday 9/25/23).  Pt seen by me before tx, continues to have depression symptoms.  AOx3, no active SI.  Will continue acute 2x/week ECT.  Pt has capacity, consents obtained.   BDI = 43.

## 2023-09-29 NOTE — ASU PATIENT PROFILE, ADULT - NS PREOP UNDERSTANDS INFO
Nothing to eat or drink after 12 mid-night 10/1/2023/ water for medication/ photo ID and insurance card/ comfortable clothing/ escort to take you home/yes

## 2023-10-02 ENCOUNTER — OUTPATIENT (OUTPATIENT)
Dept: OUTPATIENT SERVICES | Facility: HOSPITAL | Age: 68
LOS: 1 days | Discharge: HOME | End: 2023-10-02
Payer: COMMERCIAL

## 2023-10-02 ENCOUNTER — TRANSCRIPTION ENCOUNTER (OUTPATIENT)
Age: 68
End: 2023-10-02

## 2023-10-02 VITALS
WEIGHT: 142.2 LBS | HEIGHT: 67 IN | SYSTOLIC BLOOD PRESSURE: 127 MMHG | HEART RATE: 62 BPM | DIASTOLIC BLOOD PRESSURE: 64 MMHG | TEMPERATURE: 98 F | RESPIRATION RATE: 14 BRPM | OXYGEN SATURATION: 98 %

## 2023-10-02 VITALS
HEART RATE: 68 BPM | TEMPERATURE: 97 F | DIASTOLIC BLOOD PRESSURE: 64 MMHG | OXYGEN SATURATION: 97 % | SYSTOLIC BLOOD PRESSURE: 138 MMHG | RESPIRATION RATE: 14 BRPM

## 2023-10-02 DIAGNOSIS — J34.2 DEVIATED NASAL SEPTUM: Chronic | ICD-10-CM

## 2023-10-02 DIAGNOSIS — Z98.890 OTHER SPECIFIED POSTPROCEDURAL STATES: Chronic | ICD-10-CM

## 2023-10-02 DIAGNOSIS — Z98.42 CATARACT EXTRACTION STATUS, LEFT EYE: Chronic | ICD-10-CM

## 2023-10-02 DIAGNOSIS — G90.09 OTHER IDIOPATHIC PERIPHERAL AUTONOMIC NEUROPATHY: Chronic | ICD-10-CM

## 2023-10-02 PROCEDURE — 90870 ELECTROCONVULSIVE THERAPY: CPT

## 2023-10-02 RX ORDER — ONDANSETRON 8 MG/1
4 TABLET, FILM COATED ORAL ONCE
Refills: 0 | Status: DISCONTINUED | OUTPATIENT
Start: 2023-10-02 | End: 2023-10-02

## 2023-10-02 NOTE — ASU DISCHARGE PLAN (ADULT/PEDIATRIC) - ASU DC SPECIAL INSTRUCTIONSFT
AMBULATORY ELECTROCONVULSIVE THERAPY (ECT) DISCHARGE INSTRUCTIONS    Condition:  Stable for discharge to home.    1. Activities: Rest the day of your treatment.  It is normal to experience drowsiness and possibly some confusion following ECT and anesthesia.  DO NOT consume alcohol, operative machinery, drive or make important personal, business or legal decisions for at least 24 hours.    2. Diet: Begin with a light meal following ECT and progress as tolerated to a regular diet (ie. resume your normal food intake).    3. Medications: Resume medications as prescribed by your outpatient physicians.  Mild aches or headache may be experienced post treatment.  This is usually relieved  by Tylenol or other non-aspirin medications (take only amounts prescribed by each ).      4. Emergencies:  Please first call your regular outpatient psychiatric provider (ie. psychopharmacologist) for any changes in your psychiatric symptoms.   If you are unable to reach your doctor, you go directly to your local Emergency Room.      5. Routine Follow-Up Information:   Please call the ECT Department if you are unable to make your next appointment or have any questions about your ECT treatments.  You may also contact Gavin Cerrato MD at [email or web address] or [phone] for non-urgent clinical questions about your treatment course.      REMEMBER TO NOT EAT ANYTHING FOR 8 HOURS BEFORE YOUR NEXT TREATMENT.   YOU MAY DRINK ONLY CLEAR LIQUIDS UP TO 3 HOURS BEFORE YOUR TREATMENT (eg. WATER, APPLE JUICE, GINGER ALE).    SOMEONE OVER 18 YEARS OLD MUST ACCOMPANY YOU HOME FROM THE HOSPITAL ON THE DAY OF YOUR TREATMENT.    IF YOU HAVE ANY QUESTIONS ABOUT YOUR NEXT TREATMENT, COVID TESTING, OR EXPIRATION DATES OF YOUR MEDICAL CLEARANCE, PLEASE CONTACT:     ECT Department:  Lonnie “Steven” Constanza [phone] office; [phone] fax      Your next ECT treatment will be on: 10/04/23

## 2023-10-02 NOTE — ECT TREATMENT NOTE - NSECTCOMMENTS_PSY_ALL_CORE
5th tx. Pt reports being a little "brighter", but still endorses sad moo and moderate anxiety. States that he is much less anxious than prior to tx. No SEs other than ,mild headaches.  AOx3, no active SI.  Will continue acute 2x/week ECT.

## 2023-10-02 NOTE — ECT TREATMENT NOTE - NSECTMECTACURRENT1ST_PSY_ALL_CORE
Diagnoses and all orders for this visit:    1. Acute non-recurrent maxillary sinusitis  Symptoms not resolving with conservative care, penicillin allergy  -     cefdinir (OMNICEF) 300 mg capsule; Take 1 Capsule by mouth two (2) times a day. Chief Complaint   Patient presents with    Cold Symptoms     Sinusitis  Patient reports that he has had persistent symptoms for over a week. He has tried over-the-counter NyQuil with no relief. He feels he might have sinus or strep. He also has left ear pain. 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 No chest pain or shortness of breath but does have persistent cough and pharyngitis. Ears draining  2 days went to work but symptoms progressed  niquil to help with sleep  Productive cough thick and yellow, low grade fever  No sick contacts, masked while at work  No advil, but tylenol with mild relief  Hx of seasonal allergies but likely not contributing to symptoms  Pain and PND  Primarily left side of face with pressure      History reviewed. No pertinent past medical history.   Past Surgical History:   Procedure Laterality Date    HX APPENDECTOMY      HX HERNIA REPAIR      repair at 6yo L, 23yo R     Social History     Socioeconomic History    Marital status: UNKNOWN   Tobacco Use    Smoking status: Never Smoker    Smokeless tobacco: Never Used   Vaping Use    Vaping Use: Never used   Substance and Sexual Activity    Alcohol use: Yes     Comment: 6 drinks/week    Drug use: Never    Sexual activity: Yes     Partners: Female     Family History   Problem Relation Age of Onset    Other Mother         multiple slcerosis    Cancer Father         tongue (from HPV)    Heart Failure Maternal Grandmother     COPD Maternal Grandmother     Asthma Maternal Grandmother     Hypertension Maternal Grandfather     Heart Disease Maternal Grandfather         afib    High Cholesterol Maternal Grandfather     Alzheimer's Disease Paternal Grandfather     Asthma Maternal Great Grandmother     Stroke Neg Hx     Heart Attack Neg Hx      Current Outpatient Medications   Medication Sig Dispense Refill    suvorexant (BELSOMRA) 10 mg tablet Take 1 Tablet by mouth nightly as needed for Insomnia. Max Daily Amount: 10 mg. 90 Tablet 0    doxylamine succinate (UNISOM) 25 mg tablet Take 25 mg by mouth nightly as needed.  albuterol (PROVENTIL HFA, VENTOLIN HFA, PROAIR HFA) 90 mcg/actuation inhaler Take 1 Puff by inhalation every four (4) hours as needed for Wheezing. 1 Inhaler 3    melatonin 5 mg tablet Take 10 mg by mouth nightly.  esomeprazole (NEXIUM) 40 mg capsule Take 40 mg by mouth. Daily prn       Allergies   Allergen Reactions    Penicillins Rash     Can do keflex       Review of Systems - General ROS: negative for - chills or fever  Cardiovascular ROS: no chest pain or dyspnea on exertion  Respiratory ROS: no cough, shortness of breath, or wheezing    There were no vitals taken for this visit.   Constitutional: [x] Appears well-developed and well-nourished [x] No apparent distress      [] Abnormal -     Mental status: [x] Alert and awake  [x] Oriented to person/place/time [x] Able to follow commands    [] Abnormal -     Eyes:   EOM    [x]  Normal    [] Abnormal -   Sclera  [x]  Normal    [] Abnormal -          Discharge [x]  None visible   [] Abnormal -     HENT: [x] Normocephalic, atraumatic  [] Abnormal -   [x] Mouth/Throat: Mucous membranes are moist    External Ears [x] Normal  [] Abnormal -    Neck: [x] No visualized mass [] Abnormal -     Pulmonary/Chest: [x] Respiratory effort normal   [x] No visualized signs of difficulty breathing or respiratory distress        [] Abnormal -      Musculoskeletal:   [x] Normal gait with no signs of ataxia         [x] Normal range of motion of neck        [] Abnormal -     Neurological:        [x] No Facial Asymmetry (Cranial nerve 7 motor function) (limited exam due to video visit)          [x] No gaze palsy        [] Abnormal -          Skin:        [x] No significant exanthematous lesions or discoloration noted on facial skin         [] Abnormal -            Psychiatric:       [x] Normal Affect [] Abnormal -        [x] No Hallucinations        This medical record was transcribed using an electronic medical records/speech recognition system. Although proofread, it may and can contain electronic, spelling and other errors. Corrections may be executed at a later time. Please contact us for any clarifications as needed. Video this is a virtual visit. I was located in my office and the patient was located in his/her home. Pt has given consent and aware this visit will be billed to his/her health insurance. 800

## 2023-10-02 NOTE — ASU DISCHARGE PLAN (ADULT/PEDIATRIC) - NS MD DC FALL RISK RISK
For information on Fall & Injury Prevention, visit: [email or web address] OR  [email or web address] OR  https://www.cdc.gov/steadi/patient.html

## 2023-10-03 ENCOUNTER — RX RENEWAL (OUTPATIENT)
Age: 68
End: 2023-10-03

## 2023-10-03 NOTE — ASU PATIENT PROFILE, ADULT - NSICDXPASTSURGICALHX_GEN_ALL_CORE_FT
PAST SURGICAL HISTORY:  Deviated septum     History of left cataract extraction     History of surgery B/L shoulder    Sphenopalatine ganglion neuralgia      PAST SURGICAL HISTORY:  Deviated septum     History of left cataract extraction     History of surgery B/L shoulder    Right cataract     Sphenopalatine ganglion neuralgia

## 2023-10-04 ENCOUNTER — OUTPATIENT (OUTPATIENT)
Dept: OUTPATIENT SERVICES | Facility: HOSPITAL | Age: 68
LOS: 1 days | Discharge: HOME | End: 2023-10-04
Payer: COMMERCIAL

## 2023-10-04 ENCOUNTER — TRANSCRIPTION ENCOUNTER (OUTPATIENT)
Age: 68
End: 2023-10-04

## 2023-10-04 VITALS
HEIGHT: 67 IN | DIASTOLIC BLOOD PRESSURE: 44 MMHG | OXYGEN SATURATION: 98 % | SYSTOLIC BLOOD PRESSURE: 144 MMHG | WEIGHT: 142.2 LBS | RESPIRATION RATE: 14 BRPM | TEMPERATURE: 97 F | HEART RATE: 56 BPM

## 2023-10-04 VITALS
RESPIRATION RATE: 16 BRPM | SYSTOLIC BLOOD PRESSURE: 168 MMHG | DIASTOLIC BLOOD PRESSURE: 74 MMHG | TEMPERATURE: 98 F | OXYGEN SATURATION: 99 % | HEART RATE: 64 BPM

## 2023-10-04 DIAGNOSIS — H26.9 UNSPECIFIED CATARACT: Chronic | ICD-10-CM

## 2023-10-04 DIAGNOSIS — Z98.42 CATARACT EXTRACTION STATUS, LEFT EYE: Chronic | ICD-10-CM

## 2023-10-04 DIAGNOSIS — J34.2 DEVIATED NASAL SEPTUM: Chronic | ICD-10-CM

## 2023-10-04 DIAGNOSIS — Z98.890 OTHER SPECIFIED POSTPROCEDURAL STATES: Chronic | ICD-10-CM

## 2023-10-04 DIAGNOSIS — G90.09 OTHER IDIOPATHIC PERIPHERAL AUTONOMIC NEUROPATHY: Chronic | ICD-10-CM

## 2023-10-04 PROCEDURE — 90870 ELECTROCONVULSIVE THERAPY: CPT

## 2023-10-04 RX ORDER — TRAMADOL HYDROCHLORIDE 50 MG/1
1 TABLET ORAL
Refills: 0 | DISCHARGE

## 2023-10-04 RX ORDER — TRAZODONE HCL 50 MG
0 TABLET ORAL
Qty: 0 | Refills: 0 | DISCHARGE

## 2023-10-04 RX ORDER — SODIUM CHLORIDE 9 MG/ML
1000 INJECTION, SOLUTION INTRAVENOUS
Refills: 0 | Status: DISCONTINUED | OUTPATIENT
Start: 2023-10-04 | End: 2023-10-04

## 2023-10-04 RX ORDER — PANTOPRAZOLE SODIUM 20 MG/1
1 TABLET, DELAYED RELEASE ORAL
Refills: 0 | DISCHARGE

## 2023-10-04 NOTE — ASU DISCHARGE PLAN (ADULT/PEDIATRIC) - NS MD DC FALL RISK RISK
For information on Fall & Injury Prevention, visit: [email or web address] OR  [email or web address] OR  https://www.cdc.gov/steadi/patient.html

## 2023-10-04 NOTE — ECT TREATMENT NOTE - NSECTCOMMENTS_PSY_ALL_CORE
Pt rates his depression as 5/10.  Still feels down and low energy.  No active SI.  AOx3.  Tolerating ECT well.  Will continue acute ECT while monitoring for plateau of improvement in symptoms.   BDI= 29 (was 43 1 week ago).

## 2023-10-04 NOTE — ASU DISCHARGE PLAN (ADULT/PEDIATRIC) - ASU DC SPECIAL INSTRUCTIONSFT
AMBULATORY ELECTROCONVULSIVE THERAPY (ECT) DISCHARGE INSTRUCTIONS    Condition:  Stable for discharge to home.    1. Activities: Rest the day of your treatment.  It is normal to experience drowsiness and possibly some confusion following ECT and anesthesia.  DO NOT consume alcohol, operative machinery, drive or make important personal, business or legal decisions for at least 24 hours.    2. Diet: Begin with a light meal following ECT and progress as tolerated to a regular diet (ie. resume your normal food intake).    3. Medications: Resume medications as prescribed by your outpatient physicians.  Mild aches or headache may be experienced post treatment.  This is usually relieved  by Tylenol or other non-aspirin medications (take only amounts prescribed by each ).      4. Emergencies:  Please first call your regular outpatient psychiatric provider (ie. psychopharmacologist) for any changes in your psychiatric symptoms.   If you are unable to reach your doctor, you go directly to your local Emergency Room.      5. Routine Follow-Up Information:   Please call the ECT Department if you are unable to make your next appointment or have any questions about your ECT treatments.  You may also contact Gavin Cerrato MD at [email or web address] or [phone] for non-urgent clinical questions about your treatment course.      REMEMBER TO NOT EAT ANYTHING FOR 8 HOURS BEFORE YOUR NEXT TREATMENT.   YOU MAY DRINK ONLY CLEAR LIQUIDS UP TO 3 HOURS BEFORE YOUR TREATMENT (eg. WATER, APPLE JUICE, GINGER ALE).    SOMEONE OVER 18 YEARS OLD MUST ACCOMPANY YOU HOME FROM THE HOSPITAL ON THE DAY OF YOUR TREATMENT.    IF YOU HAVE ANY QUESTIONS ABOUT YOUR NEXT TREATMENT, COVID TESTING, OR EXPIRATION DATES OF YOUR MEDICAL CLEARANCE, PLEASE CONTACT:     ECT Department:  Lonnie “Steven” Constanza [phone] office; [phone] fax    Your next ECT treatment will be on: 10/9/23 (acute)

## 2023-10-06 NOTE — ASU PATIENT PROFILE, ADULT - NS PREOP UNDERSTANDS INFO
Spoke to patient o be NPO/NO solid foods after 22 00 pm on Dimitris night.  allow to drink water till 12Mn-2 am , dress comfortable, leave all valuable at home, bring ID photo and insurance cards, escort arranged, address and telephone given to patient/yes

## 2023-10-06 NOTE — ASU PATIENT PROFILE, ADULT - FALL HARM RISK - UNIVERSAL INTERVENTIONS
Bed in lowest position, wheels locked, appropriate side rails in place/Call bell, personal items and telephone in reach/Instruct patient to call for assistance before getting out of bed or chair/Non-slip footwear when patient is out of bed/[place] to call system/Physically safe environment - no spills, clutter or unnecessary equipment/Purposeful Proactive Rounding/Room/bathroom lighting operational, light cord in reach

## 2023-10-06 NOTE — ASU PATIENT PROFILE, ADULT - VISION (WITH CORRECTIVE LENSES IF THE PATIENT USUALLY WEARS THEM):
Pre-operative Bariatric Nutrition Evaluation (1 of 3)     Date: 2021   Jah Ruth M.D. Name: Inder Linda  :  1981  Age:  38  Gender: Female   Type of Surgery: []           Gastric Bypass   [x]           Sleeve Gastrectomy    ASSESSMENT:    Past Medical History:thyroid condition     Medications/Supplements:   Prior to Admission medications    Medication Sig Start Date End Date Taking? Authorizing Provider   norethindrone (MICRONOR) 0.35 mg tab  20   Provider, Historical   ergocalciferol (ERGOCALCIFEROL) 1,250 mcg (50,000 unit) capsule Take 1 Cap by mouth every seven (7) days. Indications: low vitamin D levels 3/31/20   Mónica Winston R, DO   levothyroxine (SYNTHROID) 25 mcg tablet Take 1 Tab by mouth Daily (before breakfast). 3/10/20   Alessandro Crawford MD   metFORMIN ER (GLUCOPHAGE XR) 500 mg tablet Take 2 Tabs by mouth two (2) times daily (with meals). Indications: prevention of type 2 diabetes mellitus, insulin resistance  Patient taking differently: Take 1,000 mg by mouth nightly. Indications: prevention of type 2 diabetes mellitus, insulin resistance 20   Mónica Natashas R, DO   esomeprazole (NEXIUM) 40 mg capsule Take one capsule by mouth daily for Heartburn  Indications: heartburn 20   Mónica Natashas R, DO   dicyclomine (BENTYL) 20 mg tablet Take 1 Tab by mouth every six (6) hours. Indications: abdominal spasm 20   Mónica Pines R, DO   cyanocobalamin (VITAMIN B-12) 1,000 mcg tablet Take 1,000 mcg by mouth daily. Provider, Historical       Food Allergies/Intolerances:none    Anthropometrics:    Ht:63\"    Recent Office Wt: 230#    IBW: 115#    %IBW: 200%     BMI:40    Category: obesity III     Reported wt history: Pt completing pre-op nutrition evaluation for wt loss surgery over the phone d/t social distancing guidelines d/t COVID-19. Reports highest adult BW of 230#.  Attributes wt gain over the years r/t pregnancy and medical conditions. Has attempted wt loss through various methods with most successful wt loss of 5-10#. Has been unable to maintain long term or significant wt loss and is now seeking approval for weight loss surgery. Pt will need to complete 3 months (90 days) of supervised weight loss for insurance requirements. Exercise/Physical Activity: none at present d/t changes in work schedule; prefers walking     Reported Diet History:Phentermine, OTC diet products, exercise/gym    24 Hour Diet Recall  Breakfast  Skips    Lunch  Noodles    Dinner  Chicken (baked) with vegetables    Snacks  Candy, apples, grapes (packs from home)    Beverages  Soda, water or tea (sweetened with sugar, recently reducing intake), juice        Environment/Psychosocial/Support:Pt has a close friend who has recently gone through surgery process with our program. Pt, son (age 12) and mother-in-law all live together. Pt shares grocery shopping and cooking with her mother-in-law. Pt works as a  provider. NUTRITION DIAGNOSIS:  1. Physical inactivity r/t time constraints evidenced by pt with no current exercise. Prefers walking for exercise. 2. Inadequate protein intake r/t skipping meals evidenced by diet recall that reveals less than 60 grams protein per day. NUTRITION INTERVENTION:  Pt educated on nutrition recommendations for weight loss surgery, specifically sleeve gastrectomy. Instructed on consuming 3 meals per day starting now. Use the balanced plate method to plan meals, include 3 oz of lean source of protein, 1/2 cup whole grains, unlimited non-starchy vegetables, 1/2 cup fruit and 1 serving of low fat dairy. Utilize handouts listing healthy snack and meal ideas to limit restaurant meals. After surgery measure all meals to 1/2 cup. Each meal will contain a 1/4 cup lean protein and 1/4 cup fruit, non-starchy vegetable or starch (limiting to once per day). Aim for 60 g protein per day.  Sip on 48-64 oz of sugar free, calorie free, non-carbonated beverages each day. Do not use a straw. Do not consume beverages 30 minutes before, during or 30 minutes after meals. Read all nutrition labels. Demonstrated and emphasized identifying serving size, total fat, sugar and protein content. Defined low fat as </= 3 g per serving. Discussed lean and extra lean sources of protein. Provided list of low fat cooking methods. Avoid foods with sugar listed in the first 3 ingredients and >/15 g sugar per serving. Excess sugar/fat intake may lead to dumping syndrome. Discussed signs and symptoms of dumping syndrome. Practice mindful eating habits; take small bites, chew thoroughly, avoid distractions, utilize hunger/fullness scale. Consume meals over 20-30 minutes. Attend Bariatric Support Group and increase physical activity (approved per MD) for long term weight maintenance. NUTRITION MONITORING AND EVALUATION:    The following goals were established with patient;  1. Implement walking for exercise. 2. 3 meals a day. Use protein shakes PRN instead of skipping meals. 3. Use balanced plate method for help with meal planning and portion control. 4. Continue to decrease intake of sugar sweetened beverages. 5. Review nutrition education materials. Follow up next month for continued nutrition education and supervised weight loss. Specific tips and techniques to facilitate compliance with above recommendations were provided and discussed. Nutrition evaluation reveals important lifestyle changes are indicated. Goals set and recommendations made. Will continue to assess. If further details are desired please feel free to contact me at [phone]. This phone number was also provided to the patient for any further questions or concerns.            Nichole Guevara RD Normal vision: sees adequately in most situations; can see medication labels, newsprint

## 2023-10-06 NOTE — ASU PATIENT PROFILE, ADULT - NSICDXPASTSURGICALHX_GEN_ALL_CORE_FT
PAST SURGICAL HISTORY:  Deviated septum     History of left cataract extraction     History of surgery B/L shoulder    Right cataract     Sphenopalatine ganglion neuralgia      PAST SURGICAL HISTORY:  Deviated septum     H/O colonoscopy     History of appendectomy     History of left cataract extraction     History of surgery B/L shoulder    Right cataract     S/P ECT (electroconvulsive therapy)     Sphenopalatine ganglion neuralgia

## 2023-10-06 NOTE — ASU PATIENT PROFILE, ADULT - NSCAFFEAMTFREQ_GEN_ALL_CORE_SD
Schedule echocardiogram [phone] Call [facility] if you cannot get it done before next Thursday    1-2 cups/cans per day

## 2023-10-09 ENCOUNTER — TRANSCRIPTION ENCOUNTER (OUTPATIENT)
Age: 68
End: 2023-10-09

## 2023-10-09 ENCOUNTER — OUTPATIENT (OUTPATIENT)
Dept: OUTPATIENT SERVICES | Facility: HOSPITAL | Age: 68
LOS: 1 days | Discharge: HOME | End: 2023-10-09
Payer: COMMERCIAL

## 2023-10-09 VITALS
RESPIRATION RATE: 16 BRPM | TEMPERATURE: 97 F | OXYGEN SATURATION: 97 % | HEART RATE: 57 BPM | DIASTOLIC BLOOD PRESSURE: 71 MMHG | SYSTOLIC BLOOD PRESSURE: 127 MMHG | WEIGHT: 141.1 LBS | HEIGHT: 67 IN

## 2023-10-09 VITALS
DIASTOLIC BLOOD PRESSURE: 67 MMHG | TEMPERATURE: 97 F | SYSTOLIC BLOOD PRESSURE: 157 MMHG | OXYGEN SATURATION: 99 % | HEART RATE: 60 BPM | RESPIRATION RATE: 16 BRPM

## 2023-10-09 DIAGNOSIS — Z98.890 OTHER SPECIFIED POSTPROCEDURAL STATES: Chronic | ICD-10-CM

## 2023-10-09 DIAGNOSIS — Z90.49 ACQUIRED ABSENCE OF OTHER SPECIFIED PARTS OF DIGESTIVE TRACT: Chronic | ICD-10-CM

## 2023-10-09 DIAGNOSIS — J34.2 DEVIATED NASAL SEPTUM: Chronic | ICD-10-CM

## 2023-10-09 DIAGNOSIS — H26.9 UNSPECIFIED CATARACT: Chronic | ICD-10-CM

## 2023-10-09 DIAGNOSIS — Z98.42 CATARACT EXTRACTION STATUS, LEFT EYE: Chronic | ICD-10-CM

## 2023-10-09 DIAGNOSIS — G90.09 OTHER IDIOPATHIC PERIPHERAL AUTONOMIC NEUROPATHY: Chronic | ICD-10-CM

## 2023-10-09 PROCEDURE — 90870 ELECTROCONVULSIVE THERAPY: CPT

## 2023-10-09 RX ORDER — MECLIZINE HCL 12.5 MG
1 TABLET ORAL
Refills: 0 | DISCHARGE

## 2023-10-09 RX ORDER — FOLIC ACID 7.5 MG
0 TABLET ORAL
Qty: 0 | Refills: 0 | DISCHARGE

## 2023-10-09 RX ORDER — GABAPENTIN 400 MG/1
1 CAPSULE ORAL
Refills: 0 | DISCHARGE

## 2023-10-09 RX ORDER — PROPRANOLOL HCL 160 MG
1 CAPSULE, EXTENDED RELEASE 24HR ORAL
Refills: 0 | DISCHARGE

## 2023-10-09 RX ORDER — ONDANSETRON 8 MG/1
4 TABLET, FILM COATED ORAL ONCE
Refills: 0 | Status: DISCONTINUED | OUTPATIENT
Start: 2023-10-09 | End: 2023-10-09

## 2023-10-09 RX ORDER — SODIUM CHLORIDE 9 MG/ML
500 INJECTION, SOLUTION INTRAVENOUS
Refills: 0 | Status: DISCONTINUED | OUTPATIENT
Start: 2023-10-09 | End: 2023-10-09

## 2023-10-09 RX ADMIN — SODIUM CHLORIDE 100 MILLILITER(S): 9 INJECTION, SOLUTION INTRAVENOUS at 09:41

## 2023-10-09 NOTE — ECT TREATMENT NOTE - NSECTCOMMENTS_PSY_ALL_CORE
Reprots moderate improvement, started reading again, improved energy. No active SI.  AOx3.  Tolerating ECT well.  Will continue acute ECT while monitoring for plateau of improvement in symptoms.   BDI= 21

## 2023-10-09 NOTE — ECT TREATMENT NOTE - NSCGISEVERILLNESS_PSY_ALL_CORE
Yes... 4 = Moderately ill – overt symptoms causing noticeable, but modest, functional impairment or distress; symptom level may warrant medication

## 2023-10-09 NOTE — ASU DISCHARGE PLAN (ADULT/PEDIATRIC) - ***IN THE EVENT THAT YOU DEVELOP A COMPLICATION AND YOU ARE UNABLE TO REACH YOUR OWN PHYSICIAN, YOU MAY CONTACT:
Patient called stating Dr. Barth changed her dosage of metoprolol, but the patient has heard from the pharmacy regarding this new prescription.   Statement Selected

## 2023-10-09 NOTE — ASU DISCHARGE PLAN (ADULT/PEDIATRIC) - ASU DC SPECIAL INSTRUCTIONSFT
AMBULATORY ELECTROCONVULSIVE THERAPY (ECT) DISCHARGE INSTRUCTIONS    Condition:  Stable for discharge to home.    1. Activities: Rest the day of your treatment.  It is normal to experience drowsiness and possibly some confusion following ECT and anesthesia.  DO NOT consume alcohol, operative machinery, drive or make important personal, business or legal decisions for at least 24 hours.    2. Diet: Begin with a light meal following ECT and progress as tolerated to a regular diet (ie. resume your normal food intake).    3. Medications: Resume medications as prescribed by your outpatient physicians.  Mild aches or headache may be experienced post treatment.  This is usually relieved  by Tylenol or other non-aspirin medications (take only amounts prescribed by each ).      4. Emergencies:  Please first call your regular outpatient psychiatric provider (ie. psychopharmacologist) for any changes in your psychiatric symptoms.   If you are unable to reach your doctor, you go directly to your local Emergency Room.      5. Routine Follow-Up Information:   Please call the ECT Department if you are unable to make your next appointment or have any questions about your ECT treatments.  You may also contact Gavin Cerrato MD at [email or web address] or [phone] for non-urgent clinical questions about your treatment course.      REMEMBER TO NOT EAT ANYTHING FOR 8 HOURS BEFORE YOUR NEXT TREATMENT.   YOU MAY DRINK ONLY CLEAR LIQUIDS UP TO 3 HOURS BEFORE YOUR TREATMENT (eg. WATER, APPLE JUICE, GINGER ALE).    SOMEONE OVER 18 YEARS OLD MUST ACCOMPANY YOU HOME FROM THE HOSPITAL ON THE DAY OF YOUR TREATMENT.    IF YOU HAVE ANY QUESTIONS ABOUT YOUR NEXT TREATMENT, COVID TESTING, OR EXPIRATION DATES OF YOUR MEDICAL CLEARANCE, PLEASE CONTACT:     ECT Department:  Lonnie “Steven” Constanza [phone] office; [phone] fax      Your next ECT treatment will be on: 10/11/23

## 2023-10-09 NOTE — ASU DISCHARGE PLAN (ADULT/PEDIATRIC) - NS MD DC FALL RISK RISK
For information on Fall & Injury Prevention, visit: [email or web address] OR  [email or web address] OR  https://www.cdc.gov/steadi/patient.html

## 2023-10-10 NOTE — ASU PATIENT PROFILE, ADULT - NSICDXPASTSURGICALHX_GEN_ALL_CORE_FT
PAST SURGICAL HISTORY:  Deviated septum     H/O colonoscopy     History of appendectomy     History of left cataract extraction     History of surgery B/L shoulder    Right cataract     S/P ECT (electroconvulsive therapy)     Sphenopalatine ganglion neuralgia

## 2023-10-11 ENCOUNTER — OUTPATIENT (OUTPATIENT)
Dept: OUTPATIENT SERVICES | Facility: HOSPITAL | Age: 68
LOS: 1 days | Discharge: HOME | End: 2023-10-11
Payer: COMMERCIAL

## 2023-10-11 ENCOUNTER — TRANSCRIPTION ENCOUNTER (OUTPATIENT)
Age: 68
End: 2023-10-11

## 2023-10-11 VITALS
WEIGHT: 141.76 LBS | DIASTOLIC BLOOD PRESSURE: 73 MMHG | HEART RATE: 65 BPM | RESPIRATION RATE: 16 BRPM | HEIGHT: 67 IN | SYSTOLIC BLOOD PRESSURE: 130 MMHG | TEMPERATURE: 98 F | OXYGEN SATURATION: 98 %

## 2023-10-11 VITALS
RESPIRATION RATE: 15 BRPM | DIASTOLIC BLOOD PRESSURE: 75 MMHG | SYSTOLIC BLOOD PRESSURE: 152 MMHG | OXYGEN SATURATION: 99 % | HEART RATE: 73 BPM

## 2023-10-11 DIAGNOSIS — Z90.49 ACQUIRED ABSENCE OF OTHER SPECIFIED PARTS OF DIGESTIVE TRACT: Chronic | ICD-10-CM

## 2023-10-11 DIAGNOSIS — Z98.890 OTHER SPECIFIED POSTPROCEDURAL STATES: Chronic | ICD-10-CM

## 2023-10-11 DIAGNOSIS — J34.2 DEVIATED NASAL SEPTUM: Chronic | ICD-10-CM

## 2023-10-11 DIAGNOSIS — G90.09 OTHER IDIOPATHIC PERIPHERAL AUTONOMIC NEUROPATHY: Chronic | ICD-10-CM

## 2023-10-11 DIAGNOSIS — Z98.42 CATARACT EXTRACTION STATUS, LEFT EYE: Chronic | ICD-10-CM

## 2023-10-11 DIAGNOSIS — H26.9 UNSPECIFIED CATARACT: Chronic | ICD-10-CM

## 2023-10-11 PROCEDURE — 90870 ELECTROCONVULSIVE THERAPY: CPT

## 2023-10-11 RX ORDER — ACETAMINOPHEN 500 MG
650 TABLET ORAL ONCE
Refills: 0 | Status: DISCONTINUED | OUTPATIENT
Start: 2023-10-11 | End: 2023-10-11

## 2023-10-11 RX ORDER — SODIUM CHLORIDE 9 MG/ML
500 INJECTION, SOLUTION INTRAVENOUS
Refills: 0 | Status: DISCONTINUED | OUTPATIENT
Start: 2023-10-11 | End: 2023-10-11

## 2023-10-11 RX ORDER — APREPITANT 80 MG/1
40 CAPSULE ORAL ONCE
Refills: 0 | Status: COMPLETED | OUTPATIENT
Start: 2023-10-11 | End: 2023-10-11

## 2023-10-11 RX ADMIN — APREPITANT 40 MILLIGRAM(S): 80 CAPSULE ORAL at 09:05

## 2023-10-11 NOTE — ECT TREATMENT NOTE - NSECTCOMMENTS_PSY_ALL_CORE
Pt states he feels better than preECT and "not sure if he is better than last week".  Talking to therapist about social stressors (relationship) and possibly being in place to consider future options.   Still trying to connect with new psychiatrist.  Will collaborate with Dr. Anabella milan to ensure appropriate care.   Will self monitor over weekend.  Consider taper to weekly mECT schedule when appropriate.

## 2023-10-11 NOTE — ECT TREATMENT NOTE - NSECTROSNEGAT_PSY_ALL_CORE
fragments appeared to be under 1 millimeter and could pass easily. We advanced ureteroscope and visualized the entire ureter. No stone fragments were identified. No damage to the ureter was identified. At this time, over the remaining glidewire, we placed a 6x26 double J ureteral stent in the usual fashion, and we noted appropriate placement in the upper collecting system using fluoroscopy. There was a good curl noted in the bladder. We decided to leave a string at the end of the stent, which was attached with benzoin and steri-strips. The patient's bladder was drained and removed the scope and the procedure was subsequently terminated.       Plan:  Discharge home in good condition  The patient can pull the stent in 7 days Review of Systems negative/unchanged from previous exam except as noted below

## 2023-10-11 NOTE — ASU DISCHARGE PLAN (ADULT/PEDIATRIC) - ASU DC SPECIAL INSTRUCTIONSFT
AMBULATORY ELECTROCONVULSIVE THERAPY (ECT) DISCHARGE INSTRUCTIONS    Condition:  Stable for discharge to home.    1. Activities: Rest the day of your treatment.  It is normal to experience drowsiness and possibly some confusion following ECT and anesthesia.  DO NOT consume alcohol, operative machinery, drive or make important personal, business or legal decisions for at least 24 hours.    2. Diet: Begin with a light meal following ECT and progress as tolerated to a regular diet (ie. resume your normal food intake).    3. Medications: Resume medications as prescribed by your outpatient physicians.  Mild aches or headache may be experienced post treatment.  This is usually relieved  by Tylenol or other non-aspirin medications (take only amounts prescribed by each ).      4. Emergencies:  Please first call your regular outpatient psychiatric provider (ie. psychopharmacologist) for any changes in your psychiatric symptoms.   If you are unable to reach your doctor, you go directly to your local Emergency Room.      5. Routine Follow-Up Information:   Please call the ECT Department if you are unable to make your next appointment or have any questions about your ECT treatments.  You may also contact Gavin Cerrato MD at [email or web address] or [phone] for non-urgent clinical questions about your treatment course.      REMEMBER TO NOT EAT ANYTHING FOR 8 HOURS BEFORE YOUR NEXT TREATMENT.   YOU MAY DRINK ONLY CLEAR LIQUIDS UP TO 3 HOURS BEFORE YOUR TREATMENT (eg. WATER, APPLE JUICE, GINGER ALE).    SOMEONE OVER 18 YEARS OLD MUST ACCOMPANY YOU HOME FROM THE HOSPITAL ON THE DAY OF YOUR TREATMENT.    IF YOU HAVE ANY QUESTIONS ABOUT YOUR NEXT TREATMENT, COVID TESTING, OR EXPIRATION DATES OF YOUR MEDICAL CLEARANCE, PLEASE CONTACT:     ECT Department:  Lonnie Mccollum” Constanza [phone] office; [phone] fax      Your next ECT treatment will be on: Monday 10/16/23 (acute)

## 2023-10-13 NOTE — ASU PATIENT PROFILE, ADULT - NS PREOP UNDERSTANDS INFO
Spoke to patient to be NPO/No solid foods after 2200 pm on Sunday night , allow to drink water till  12Mn,  dress comfortable, leave all valuable at home,  escort arranged, address and telephone given to patient/yes

## 2023-10-16 ENCOUNTER — OUTPATIENT (OUTPATIENT)
Dept: OUTPATIENT SERVICES | Facility: HOSPITAL | Age: 68
LOS: 1 days | Discharge: HOME | End: 2023-10-16

## 2023-10-16 ENCOUNTER — TRANSCRIPTION ENCOUNTER (OUTPATIENT)
Age: 68
End: 2023-10-16

## 2023-10-16 VITALS
RESPIRATION RATE: 12 BRPM | SYSTOLIC BLOOD PRESSURE: 162 MMHG | DIASTOLIC BLOOD PRESSURE: 71 MMHG | HEART RATE: 62 BPM | OXYGEN SATURATION: 97 %

## 2023-10-16 VITALS
RESPIRATION RATE: 16 BRPM | DIASTOLIC BLOOD PRESSURE: 63 MMHG | HEART RATE: 68 BPM | OXYGEN SATURATION: 97 % | SYSTOLIC BLOOD PRESSURE: 112 MMHG | TEMPERATURE: 98 F | WEIGHT: 143.3 LBS | HEIGHT: 67 IN

## 2023-10-16 DIAGNOSIS — G90.09 OTHER IDIOPATHIC PERIPHERAL AUTONOMIC NEUROPATHY: Chronic | ICD-10-CM

## 2023-10-16 DIAGNOSIS — Z98.42 CATARACT EXTRACTION STATUS, LEFT EYE: Chronic | ICD-10-CM

## 2023-10-16 DIAGNOSIS — H26.9 UNSPECIFIED CATARACT: Chronic | ICD-10-CM

## 2023-10-16 DIAGNOSIS — Z98.890 OTHER SPECIFIED POSTPROCEDURAL STATES: Chronic | ICD-10-CM

## 2023-10-16 DIAGNOSIS — Z90.49 ACQUIRED ABSENCE OF OTHER SPECIFIED PARTS OF DIGESTIVE TRACT: Chronic | ICD-10-CM

## 2023-10-16 DIAGNOSIS — J34.2 DEVIATED NASAL SEPTUM: Chronic | ICD-10-CM

## 2023-10-16 RX ORDER — ERGOCALCIFEROL 1.25 MG/1
1 CAPSULE ORAL
Refills: 0 | DISCHARGE

## 2023-10-16 RX ORDER — ALPRAZOLAM 0.25 MG
1 TABLET ORAL
Refills: 0 | DISCHARGE

## 2023-10-16 RX ORDER — ONDANSETRON 8 MG/1
4 TABLET, FILM COATED ORAL ONCE
Refills: 0 | Status: DISCONTINUED | OUTPATIENT
Start: 2023-10-16 | End: 2023-10-16

## 2023-10-16 RX ORDER — ALBUTEROL 90 UG/1
2 AEROSOL, METERED ORAL
Refills: 0 | DISCHARGE

## 2023-10-16 RX ORDER — RIMEGEPANT SULFATE 75 MG/75MG
1 TABLET, ORALLY DISINTEGRATING ORAL
Refills: 0 | DISCHARGE

## 2023-10-16 RX ORDER — APREPITANT 80 MG/1
40 CAPSULE ORAL ONCE
Refills: 0 | Status: COMPLETED | OUTPATIENT
Start: 2023-10-16 | End: 2023-10-16

## 2023-10-16 RX ORDER — TERAZOSIN HYDROCHLORIDE 10 MG/1
1 CAPSULE ORAL
Refills: 0 | DISCHARGE

## 2023-10-16 RX ORDER — SODIUM CHLORIDE 9 MG/ML
500 INJECTION, SOLUTION INTRAVENOUS
Refills: 0 | Status: DISCONTINUED | OUTPATIENT
Start: 2023-10-16 | End: 2023-10-16

## 2023-10-16 RX ORDER — FAMOTIDINE 10 MG/ML
1 INJECTION INTRAVENOUS
Refills: 0 | DISCHARGE

## 2023-10-16 RX ADMIN — APREPITANT 40 MILLIGRAM(S): 80 CAPSULE ORAL at 08:36

## 2023-10-16 NOTE — ASU DISCHARGE PLAN (ADULT/PEDIATRIC) - NS MD DC FALL RISK RISK
For information on Fall & Injury Prevention, visit: [email or web address] OR  [email or web address] OR  https://www.cdc.gov/steadi/patient.html

## 2023-10-16 NOTE — ECT TREATMENT NOTE - NSECTCOMMENTS_PSY_ALL_CORE
Pt states mood is "about the same" as last week.  BDI 20.  Does notice he feels a little unsteady at times (but not all the time) and occasionally feels a little dizziness.  Feels this is new since starting ECT.  No falls.  AOx3.   Also notices he has minor memory issues such as "just not as quick as I usually am in conversations".   Agrees with plan to have this be only appt this week and if stable transition to mECT.   Still connecting with new psych MD who will follow him more long term.  Current psych MD (Dr. Kyle) aware of status and plan.

## 2023-10-16 NOTE — ASU DISCHARGE PLAN (ADULT/PEDIATRIC) - ASU DC SPECIAL INSTRUCTIONSFT
AMBULATORY ELECTROCONVULSIVE THERAPY (ECT) DISCHARGE INSTRUCTIONS    Condition:  Stable for discharge to home.    1. Activities: Rest the day of your treatment.  It is normal to experience drowsiness and possibly some confusion following ECT and anesthesia.  DO NOT consume alcohol, operative machinery, drive or make important personal, business or legal decisions for at least 24 hours.    2. Diet: Begin with a light meal following ECT and progress as tolerated to a regular diet (ie. resume your normal food intake).    3. Medications: Resume medications as prescribed by your outpatient physicians.  Mild aches or headache may be experienced post treatment.  This is usually relieved  by Tylenol or other non-aspirin medications (take only amounts prescribed by each ).      4. Emergencies:  Please first call your regular outpatient psychiatric provider (ie. psychopharmacologist) for any changes in your psychiatric symptoms.   If you are unable to reach your doctor, you go directly to your local Emergency Room.      5. Routine Follow-Up Information:   Please call the ECT Department if you are unable to make your next appointment or have any questions about your ECT treatments.  You may also contact Gavin Cerrato MD at [email or web address] or [phone] for non-urgent clinical questions about your treatment course.      REMEMBER TO NOT EAT ANYTHING FOR 8 HOURS BEFORE YOUR NEXT TREATMENT.   YOU MAY DRINK ONLY CLEAR LIQUIDS UP TO 3 HOURS BEFORE YOUR TREATMENT (eg. WATER, APPLE JUICE, GINGER ALE).    SOMEONE OVER 18 YEARS OLD MUST ACCOMPANY YOU HOME FROM THE HOSPITAL ON THE DAY OF YOUR TREATMENT.    IF YOU HAVE ANY QUESTIONS ABOUT YOUR NEXT TREATMENT, COVID TESTING, OR EXPIRATION DATES OF YOUR MEDICAL CLEARANCE, PLEASE CONTACT:     ECT Department:  Lonnie Mccollum” Constanza [phone] office; [phone] fax      Your next ECT treatment will be on:  Monday 10/23/23 (1 week)

## 2023-10-17 LAB
MISCELLANEOUS TEST NAME: SIGNIFICANT CHANGE UP
MISCELLANEOUS TEST NAME: SIGNIFICANT CHANGE UP

## 2023-11-15 LAB
ALBUMIN SERPL ELPH-MCNC: 4.3 G/DL
ALP BLD-CCNC: 69 U/L
ALT SERPL-CCNC: 20 U/L
ANION GAP SERPL CALC-SCNC: 11 MMOL/L
AST SERPL-CCNC: 24 U/L
BASOPHILS # BLD AUTO: 0.03 K/UL
BASOPHILS NFR BLD AUTO: 0.4 %
BILIRUB SERPL-MCNC: 0.3 MG/DL
BUN SERPL-MCNC: 15 MG/DL
CALCIUM SERPL-MCNC: 9.7 MG/DL
CHLORIDE SERPL-SCNC: 104 MMOL/L
CHOLEST SERPL-MCNC: 169 MG/DL
CO2 SERPL-SCNC: 26 MMOL/L
CREAT SERPL-MCNC: 1.47 MG/DL
EGFR: 51 ML/MIN/1.73M2
EOSINOPHIL # BLD AUTO: 0.09 K/UL
EOSINOPHIL NFR BLD AUTO: 1.3 %
ESTIMATED AVERAGE GLUCOSE: 108 MG/DL
GLUCOSE SERPL-MCNC: 99 MG/DL
HBA1C MFR BLD HPLC: 5.4 %
HCT VFR BLD CALC: 47.2 %
HDLC SERPL-MCNC: 72 MG/DL
HGB BLD-MCNC: 14.3 G/DL
IMM GRANULOCYTES NFR BLD AUTO: 0.4 %
LDLC SERPL CALC-MCNC: 77 MG/DL
LYMPHOCYTES # BLD AUTO: 2.14 K/UL
LYMPHOCYTES NFR BLD AUTO: 30.1 %
MAN DIFF?: NORMAL
MCHC RBC-ENTMCNC: 28.2 PG
MCHC RBC-ENTMCNC: 30.3 GM/DL
MCV RBC AUTO: 93.1 FL
MONOCYTES # BLD AUTO: 0.5 K/UL
MONOCYTES NFR BLD AUTO: 7 %
NEUTROPHILS # BLD AUTO: 4.32 K/UL
NEUTROPHILS NFR BLD AUTO: 60.8 %
NONHDLC SERPL-MCNC: 97 MG/DL
PLATELET # BLD AUTO: 160 K/UL
POTASSIUM SERPL-SCNC: 4.7 MMOL/L
PROT SERPL-MCNC: 6.2 G/DL
PSA SERPL-MCNC: 1.5 NG/ML
RBC # BLD: 5.07 M/UL
RBC # FLD: 15.6 %
SODIUM SERPL-SCNC: 140 MMOL/L
TESTOST FREE SERPL-MCNC: 2.5 PG/ML
TESTOST SERPL-MCNC: 446 NG/DL
TRIGL SERPL-MCNC: 112 MG/DL
TSH SERPL-ACNC: 2.44 UIU/ML
WBC # FLD AUTO: 7.11 K/UL

## 2023-11-17 DIAGNOSIS — Z79.899 OTHER LONG TERM (CURRENT) DRUG THERAPY: ICD-10-CM

## 2023-11-20 LAB
ALBUMIN SERPL ELPH-MCNC: 4.1 G/DL
ALP BLD-CCNC: 67 U/L
ALT SERPL-CCNC: 19 U/L
ANION GAP SERPL CALC-SCNC: 9 MMOL/L
AST SERPL-CCNC: 24 U/L
BASOPHILS # BLD AUTO: 0.01 K/UL
BASOPHILS NFR BLD AUTO: 0.1 %
BILIRUB SERPL-MCNC: 0.5 MG/DL
BUN SERPL-MCNC: 21 MG/DL
CALCIUM SERPL-MCNC: 9.2 MG/DL
CHLORIDE SERPL-SCNC: 108 MMOL/L
CO2 SERPL-SCNC: 23 MMOL/L
CREAT SERPL-MCNC: 1.43 MG/DL
EGFR: 53 ML/MIN/1.73M2
EOSINOPHIL # BLD AUTO: 0.08 K/UL
EOSINOPHIL NFR BLD AUTO: 1.1 %
GLUCOSE SERPL-MCNC: 105 MG/DL
HCT VFR BLD CALC: 46.4 %
HGB BLD-MCNC: 14.2 G/DL
IMM GRANULOCYTES NFR BLD AUTO: 0.3 %
LITHIUM SERPL-SCNC: 0.38 MMOL/L
LYMPHOCYTES # BLD AUTO: 2.37 K/UL
LYMPHOCYTES NFR BLD AUTO: 32.6 %
MAN DIFF?: NORMAL
MCHC RBC-ENTMCNC: 28.5 PG
MCHC RBC-ENTMCNC: 30.6 GM/DL
MCV RBC AUTO: 93 FL
MONOCYTES # BLD AUTO: 0.46 K/UL
MONOCYTES NFR BLD AUTO: 6.3 %
NEUTROPHILS # BLD AUTO: 4.32 K/UL
NEUTROPHILS NFR BLD AUTO: 59.6 %
PLATELET # BLD AUTO: 149 K/UL
POTASSIUM SERPL-SCNC: 4.8 MMOL/L
PROT SERPL-MCNC: 5.9 G/DL
RBC # BLD: 4.99 M/UL
RBC # FLD: 15.6 %
SODIUM SERPL-SCNC: 140 MMOL/L
WBC # FLD AUTO: 7.26 K/UL

## 2023-11-28 ENCOUNTER — RESULT REVIEW (OUTPATIENT)
Age: 68
End: 2023-11-28

## 2023-11-28 ENCOUNTER — APPOINTMENT (OUTPATIENT)
Dept: INTERNAL MEDICINE | Facility: CLINIC | Age: 68
End: 2023-11-28
Payer: COMMERCIAL

## 2023-11-28 ENCOUNTER — OUTPATIENT (OUTPATIENT)
Dept: OUTPATIENT SERVICES | Facility: HOSPITAL | Age: 68
LOS: 1 days | End: 2023-11-28

## 2023-11-28 ENCOUNTER — APPOINTMENT (OUTPATIENT)
Dept: MRI IMAGING | Facility: CLINIC | Age: 68
End: 2023-11-28
Payer: COMMERCIAL

## 2023-11-28 DIAGNOSIS — Z98.890 OTHER SPECIFIED POSTPROCEDURAL STATES: Chronic | ICD-10-CM

## 2023-11-28 DIAGNOSIS — Z98.42 CATARACT EXTRACTION STATUS, LEFT EYE: Chronic | ICD-10-CM

## 2023-11-28 DIAGNOSIS — Z90.49 ACQUIRED ABSENCE OF OTHER SPECIFIED PARTS OF DIGESTIVE TRACT: Chronic | ICD-10-CM

## 2023-11-28 DIAGNOSIS — J34.2 DEVIATED NASAL SEPTUM: Chronic | ICD-10-CM

## 2023-11-28 DIAGNOSIS — H26.9 UNSPECIFIED CATARACT: Chronic | ICD-10-CM

## 2023-11-28 PROCEDURE — 90662 IIV NO PRSV INCREASED AG IM: CPT

## 2023-11-28 PROCEDURE — 72141 MRI NECK SPINE W/O DYE: CPT | Mod: 26

## 2023-11-28 PROCEDURE — G0008: CPT

## 2023-11-30 ENCOUNTER — APPOINTMENT (OUTPATIENT)
Dept: PAIN MANAGEMENT | Facility: CLINIC | Age: 68
End: 2023-11-30
Payer: COMMERCIAL

## 2023-11-30 VITALS
OXYGEN SATURATION: 96 % | WEIGHT: 142 LBS | DIASTOLIC BLOOD PRESSURE: 81 MMHG | HEIGHT: 68 IN | BODY MASS INDEX: 21.52 KG/M2 | SYSTOLIC BLOOD PRESSURE: 136 MMHG

## 2023-11-30 DIAGNOSIS — M48.02 SPINAL STENOSIS, CERVICAL REGION: ICD-10-CM

## 2023-11-30 DIAGNOSIS — M43.22 FUSION OF SPINE, CERVICAL REGION: ICD-10-CM

## 2023-11-30 PROCEDURE — 64491 INJ PARAVERT F JNT C/T 2 LEV: CPT

## 2023-11-30 PROCEDURE — 64490 INJ PARAVERT F JNT C/T 1 LEV: CPT

## 2023-12-05 ENCOUNTER — APPOINTMENT (OUTPATIENT)
Dept: PAIN MANAGEMENT | Facility: CLINIC | Age: 68
End: 2023-12-05
Payer: COMMERCIAL

## 2023-12-05 VITALS — SYSTOLIC BLOOD PRESSURE: 115 MMHG | HEART RATE: 66 BPM | OXYGEN SATURATION: 99 % | DIASTOLIC BLOOD PRESSURE: 66 MMHG

## 2023-12-05 DIAGNOSIS — M62.838 OTHER MUSCLE SPASM: ICD-10-CM

## 2023-12-05 DIAGNOSIS — M79.18 MYALGIA, OTHER SITE: ICD-10-CM

## 2023-12-05 PROCEDURE — 20553 NJX 1/MLT TRIGGER POINTS 3/>: CPT

## 2023-12-05 PROCEDURE — 99213 OFFICE O/P EST LOW 20 MIN: CPT | Mod: 25

## 2023-12-06 ENCOUNTER — NON-APPOINTMENT (OUTPATIENT)
Age: 68
End: 2023-12-06

## 2023-12-13 LAB
ALBUMIN SERPL ELPH-MCNC: 4.1 G/DL
ALP BLD-CCNC: 72 U/L
ALT SERPL-CCNC: 19 U/L
ANION GAP SERPL CALC-SCNC: 8 MMOL/L
AST SERPL-CCNC: 24 U/L
BASOPHILS # BLD AUTO: 0.02 K/UL
BASOPHILS NFR BLD AUTO: 0.3 %
BILIRUB SERPL-MCNC: 0.3 MG/DL
BUN SERPL-MCNC: 20 MG/DL
CALCIUM SERPL-MCNC: 9.3 MG/DL
CHLORIDE SERPL-SCNC: 106 MMOL/L
CHOLEST SERPL-MCNC: 164 MG/DL
CO2 SERPL-SCNC: 25 MMOL/L
CREAT SERPL-MCNC: 1.32 MG/DL
EGFR: 58 ML/MIN/1.73M2
EOSINOPHIL # BLD AUTO: 0.07 K/UL
EOSINOPHIL NFR BLD AUTO: 1 %
GLUCOSE SERPL-MCNC: 81 MG/DL
HCT VFR BLD CALC: 49 %
HDLC SERPL-MCNC: 64 MG/DL
HGB BLD-MCNC: 15 G/DL
IMM GRANULOCYTES NFR BLD AUTO: 0.3 %
LDLC SERPL CALC-MCNC: 76 MG/DL
LITHIUM SERPL-SCNC: 0.44 MMOL/L
LYMPHOCYTES # BLD AUTO: 1.98 K/UL
LYMPHOCYTES NFR BLD AUTO: 29.4 %
MAN DIFF?: NORMAL
MCHC RBC-ENTMCNC: 28 PG
MCHC RBC-ENTMCNC: 30.6 GM/DL
MCV RBC AUTO: 91.6 FL
MONOCYTES # BLD AUTO: 0.41 K/UL
MONOCYTES NFR BLD AUTO: 6.1 %
NEUTROPHILS # BLD AUTO: 4.24 K/UL
NEUTROPHILS NFR BLD AUTO: 62.9 %
NONHDLC SERPL-MCNC: 100 MG/DL
PLATELET # BLD AUTO: 168 K/UL
POTASSIUM SERPL-SCNC: 4.8 MMOL/L
PROT SERPL-MCNC: 6 G/DL
RBC # BLD: 5.35 M/UL
RBC # FLD: 14.8 %
SODIUM SERPL-SCNC: 139 MMOL/L
TESTOST SERPL-MCNC: 388 NG/DL
TRIGL SERPL-MCNC: 140 MG/DL
TSH SERPL-ACNC: 2.08 UIU/ML
WBC # FLD AUTO: 6.74 K/UL

## 2023-12-15 ENCOUNTER — APPOINTMENT (OUTPATIENT)
Dept: PAIN MANAGEMENT | Facility: CLINIC | Age: 68
End: 2023-12-15
Payer: COMMERCIAL

## 2023-12-15 DIAGNOSIS — G43.809 OTHER MIGRAINE, NOT INTRACTABLE, W/OUT STATUS MIGRAINOSUS: ICD-10-CM

## 2023-12-15 DIAGNOSIS — R53.82 CHRONIC FATIGUE, UNSPECIFIED: ICD-10-CM

## 2023-12-15 PROCEDURE — 64510 N BLOCK STELLATE GANGLION: CPT

## 2023-12-18 NOTE — ASU PREOP CHECKLIST - ANTIBIOTIC
- EGD for hematemesis and dyspepsia.   - Findings/path: white plaques in distal esophagus (biopsied -- negative for candida), gastritis (biopsied -- negative for H Pylori)  - POC: No bleeding lesions seen to explain patient's episode of hematemesis. Dyspepsia is likely due to minimal gastritis seen. OK to decrease to Omeprazole 40 mg daily AC to treat minimal gastritis seen.    - C/s for hematochezia  - Findings: external hemorrhoids, otherwise normal   - POC: Etiology of hematochezia is likely due to hemorrhoidal bleeding. Repeat colonoscopy at 44 yrs old. yes

## 2023-12-25 NOTE — PROCEDURE
[FreeTextEntry1] : ULTRASOUND GUIDED INJECTION OF Left Dorsal Scapular Nerve, spinal accessory nerve, levator scapulae, trapezius, rhomboid minor  Equipment: [facility] Ultrasound  Procedural pause conducted to verify: correct patient identity, procedure to be performed and as applicable, correct side and site, correct patient position, and availability of implants, special equipment or special requirements. The use of direct ultrasound visualization of the needle (rather than a non-guided injection) was required to ensure accurate injection placement so there can be diagnostic specificity when evaluating effectiveness of the injection, and for safety purposes to minimize risk of bleeding or injury to nearby neurovascular structures.  Informed Consent: Following denial of allergy and review of potential side effects and complications including but not necessarily limited to infection, allergic reaction, local tissue breakdown, systemic effects of corticosteroids, elevation of blood glucose, injury to soft tissue and/or nerves and seizure, the patient indicated understanding and agreed to proceed.  Technique: The procedure was carried out under sterile technique. The patient was placed in a seated position.  Prior to the procedure, the target structures were scanned using a linear array transducer. The optimal needle approach for the procedure was determined. The region was then prepared with a alcohol swab and re-examined using the same transducer and sterile ultrasound transducer gel. Local anesthesia was obtained with 1% lidocaine and 2inch, 25gauge needle.  Under direct ultrasound visualization, using a 25g 3.5in needle, the trapezius muscle, spinal accessory nerve, levator scapula muscle, rhomboid minor, dorsal scapular nerve were identified. After negative aspiration, 9cc 0.25% ropivacaine and 10mg dexamethasone was injected. The needle was withdrawn and pressure was held for hemostasis. At no time during the procedure did the patient experience paresthesias. A simple dressing was placed over the procedure site.

## 2023-12-25 NOTE — DATA REVIEWED
[FreeTextEntry1] : Cervical MRI 11/28/23 - C3-C4 disc herniation indenting the right ventral thecal sac, new since the prior study. C3/C4 no evidence of spinal cord compression - C6-C7 autofusion likely due to disc degeneration  - Cervical spinal stenosis

## 2023-12-25 NOTE — HISTORY OF PRESENT ILLNESS
[FreeTextEntry1] : Patient is a 68 year old male with pmhx of chronic cluster migraine headaches, presenting with left sided neck pain and left sided facial pain. Pain is felt on left side of neck radiating to scapula, as well as left sided facial pain and left eye pain which feels like a heaviness and cloudiness above his eye lids. He reports feeling constant pain which is worse with movement. The cluster headaches are accompanied by feelings of nausea and mood agitation at times. He has recently been started on low dose Lithium which has improved his symptoms but not completely. He does stretching at home which helps minimally. He is currently tapering off suboxone.  He denies any bowel or bladder incontinence and denies any numbness or paresthesia in upper extremities.

## 2023-12-25 NOTE — REVIEW OF SYSTEMS
[Numbness] : no numbness [FreeTextEntry3] : cluster migraine [de-identified] : Left sided facial pain

## 2023-12-26 PROBLEM — R53.82 CHRONIC FATIGUE: Status: ACTIVE | Noted: 2023-08-14

## 2023-12-26 PROBLEM — G43.809 CERVICOGENIC MIGRAINE: Status: ACTIVE | Noted: 2023-11-30

## 2023-12-26 NOTE — PROCEDURE
[FreeTextEntry1] : The potential benefits as well as rare but possible risks were reviewed with the patient.  These risks including infection including abscess, meningitis, osteomyelitis, and discitis, bleeding including epidural hematoma, nerve injury, paralysis, failure to relieve pain or worse pain, headache, pneumothorax, elevated blood sugars, allergic reactions, adverse reactions to medications, seizure, stroke, weakness, vasovagal reactions, falls, etc. Following that discussion, we decided together that the potential benefits outweigh the potential risks and we decided to proceed with scheduling the procedure.  I answered all the patient's questions about the procedure including the technical details of the procedure and also offered that if any other questions arise, we will also be happy to answer those on the day of the procedure. All questions today were answered to the patient's satisfaction, and the patient stated their verbal understanding.     After obtaining consent, pre-procedure pulse and blood pressure were stable and available in the patient's chart for review.  The patient was placed in the supine position with a thoracolumbar roll. The patient was prepped and draped in a sterile manner. The neck anatomy was identified with the fluoroscopy. The carotid artery, trachea and longus colli muscle were identified. A 22G spinal needle was inserted and advanced in real time under ultrasound guidance to the body of the longus colli muscle. There was no evidence of paresthesia, heme, or CSF. An injection was performed with 5 ml of Lidocaine 1%. This was administered over a two to three minute period with frequent aspirations, all of which were negative. 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 Patient had an increase in temperature of arm, and reported improvement of pain. There was no evidence of motor blockade. The patient tolerated the procedure well. Following the procedure the patient's vital signs were stable. The intravenous was removed. The patient was discharged home in good condition after being given discharge instructions.        Time Out: Immediately prior to the procedure, the following was verbally confirmed that there is a consent form and that the correct patient, planned procedure, site and side are consistent with documentation and that necessary equipment and/or blood products are available prior to the start of the case.      Complications: none       EBL: <5 cc

## 2023-12-28 ENCOUNTER — RX RENEWAL (OUTPATIENT)
Age: 68
End: 2023-12-28

## 2023-12-28 RX ORDER — TESTOSTERONE CYPIONATE 200 MG/ML
200 INJECTION, SOLUTION INTRAMUSCULAR
Qty: 10 | Refills: 0 | Status: ACTIVE | COMMUNITY
Start: 2022-03-28 | End: 1900-01-01

## 2024-01-01 NOTE — ED ADULT NURSE NOTE - NSICDXPASTMEDICALHX_GEN_ALL_CORE_FT
PAST MEDICAL HISTORY:  Aortitis     Autoimmune disease     GCA (giant cell arteritis)     Leukemia        medical evaluation

## 2024-01-08 ENCOUNTER — RX RENEWAL (OUTPATIENT)
Age: 69
End: 2024-01-08

## 2024-01-08 RX ORDER — ZOLPIDEM TARTRATE 10 MG/1
10 TABLET ORAL
Qty: 30 | Refills: 3 | Status: ACTIVE | COMMUNITY
Start: 2020-04-16 | End: 1900-01-01

## 2024-01-09 ENCOUNTER — APPOINTMENT (OUTPATIENT)
Dept: PAIN MANAGEMENT | Facility: CLINIC | Age: 69
End: 2024-01-09
Payer: SELF-PAY

## 2024-01-09 VITALS — DIASTOLIC BLOOD PRESSURE: 78 MMHG | SYSTOLIC BLOOD PRESSURE: 135 MMHG

## 2024-01-09 VITALS — OXYGEN SATURATION: 95 % | SYSTOLIC BLOOD PRESSURE: 152 MMHG | HEART RATE: 62 BPM | DIASTOLIC BLOOD PRESSURE: 78 MMHG

## 2024-01-09 DIAGNOSIS — M47.812 SPONDYLOSIS W/OUT MYELOPATHY OR RADICULOPATHY, CERVICAL REGION: ICD-10-CM

## 2024-01-09 PROCEDURE — 64490 INJ PARAVERT F JNT C/T 1 LEV: CPT | Mod: LT

## 2024-01-09 PROCEDURE — 64491 INJ PARAVERT F JNT C/T 2 LEV: CPT | Mod: LT

## 2024-01-11 PROBLEM — M47.812 CERVICAL SPONDYLOSIS: Status: ACTIVE | Noted: 2023-11-30

## 2024-01-11 NOTE — PROCEDURE
[FreeTextEntry1] : Cervical Medial Branch Block  (C2-4) left  The potential benefits as well as rare but possible risks were reviewed with the patient.  These risks including infection including epidural abscess, meningitis, osteomyelitis, and discitis, bleeding including epidural hematoma, nerve injury, paralysis, failure to relieve pain or worse pain, headache, pneumothorax, elevated blood sugars, allergic reactions, adverse reactions to medications, vasovagal reactions, falls, etc. Following that discussion, all questions were again answered to the patients satisfaction, the patient stated their verbal understanding and written consent was obtained.    After obtaining consent, pre-procedure blood pressure and pulse were recorded and are in the nursing record for review. The patient was placed in the supine position. The respective cervical area was prepped with chloroprep and draped in sterile fashion. Lateral fluoroscopy was used during the procedure  A 25 gauge 1.5 inch needle was inserted into the target medial branch nerve under fluoroscopic guidance. No paresthesias were elicited with needle placement and aspiration was negative for blood and CSF. Next 1 ml of a Solution of 2 ml Bupivacaine 0.25% and 10 mg Dexamethasone was injected.   The identical procedure was performed at the remaining levels. The skin was cleansed, and a sterile bandage was applied. Following the procedure, the patient's vital signs were stable. The patient tolerated the procedure well and no complications were encountered. The patient was discharged home in good condition with post-procedural instructions.  Time Out: Immediately prior to the procedure, the following was verbally confirmed that there is a consent form and that the correct patient, planned procedure, site and side are consistent with documentation and that necessary equipment and/or blood products are available prior to the start of the case.  Complications: none EBL: <5 cc

## 2024-01-15 ENCOUNTER — EMERGENCY (EMERGENCY)
Facility: HOSPITAL | Age: 69
LOS: 1 days | Discharge: HOME | End: 2024-01-15
Attending: STUDENT IN AN ORGANIZED HEALTH CARE EDUCATION/TRAINING PROGRAM | Admitting: STUDENT IN AN ORGANIZED HEALTH CARE EDUCATION/TRAINING PROGRAM
Payer: COMMERCIAL

## 2024-01-15 VITALS
HEART RATE: 86 BPM | SYSTOLIC BLOOD PRESSURE: 134 MMHG | OXYGEN SATURATION: 96 % | RESPIRATION RATE: 18 BRPM | DIASTOLIC BLOOD PRESSURE: 77 MMHG | HEIGHT: 68 IN | TEMPERATURE: 99 F | WEIGHT: 141.98 LBS

## 2024-01-15 VITALS
HEART RATE: 82 BPM | RESPIRATION RATE: 18 BRPM | OXYGEN SATURATION: 98 % | DIASTOLIC BLOOD PRESSURE: 76 MMHG | TEMPERATURE: 99 F | SYSTOLIC BLOOD PRESSURE: 141 MMHG

## 2024-01-15 DIAGNOSIS — Z98.890 OTHER SPECIFIED POSTPROCEDURAL STATES: Chronic | ICD-10-CM

## 2024-01-15 DIAGNOSIS — M31.6 OTHER GIANT CELL ARTERITIS: ICD-10-CM

## 2024-01-15 DIAGNOSIS — G90.09 OTHER IDIOPATHIC PERIPHERAL AUTONOMIC NEUROPATHY: Chronic | ICD-10-CM

## 2024-01-15 DIAGNOSIS — R00.2 PALPITATIONS: ICD-10-CM

## 2024-01-15 DIAGNOSIS — Z98.42 CATARACT EXTRACTION STATUS, LEFT EYE: Chronic | ICD-10-CM

## 2024-01-15 DIAGNOSIS — R07.89 OTHER CHEST PAIN: ICD-10-CM

## 2024-01-15 DIAGNOSIS — R07.9 CHEST PAIN, UNSPECIFIED: ICD-10-CM

## 2024-01-15 DIAGNOSIS — Z90.49 ACQUIRED ABSENCE OF OTHER SPECIFIED PARTS OF DIGESTIVE TRACT: Chronic | ICD-10-CM

## 2024-01-15 DIAGNOSIS — H26.9 UNSPECIFIED CATARACT: Chronic | ICD-10-CM

## 2024-01-15 DIAGNOSIS — J34.2 DEVIATED NASAL SEPTUM: Chronic | ICD-10-CM

## 2024-01-15 LAB
ANION GAP SERPL CALC-SCNC: 9 MMOL/L — SIGNIFICANT CHANGE UP (ref 5–17)
ANION GAP SERPL CALC-SCNC: 9 MMOL/L — SIGNIFICANT CHANGE UP (ref 5–17)
APTT BLD: 26.7 SEC — SIGNIFICANT CHANGE UP (ref 24.5–35.6)
APTT BLD: 26.7 SEC — SIGNIFICANT CHANGE UP (ref 24.5–35.6)
BASOPHILS # BLD AUTO: 0.03 K/UL — SIGNIFICANT CHANGE UP (ref 0–0.2)
BASOPHILS # BLD AUTO: 0.03 K/UL — SIGNIFICANT CHANGE UP (ref 0–0.2)
BASOPHILS NFR BLD AUTO: 0.3 % — SIGNIFICANT CHANGE UP (ref 0–2)
BASOPHILS NFR BLD AUTO: 0.3 % — SIGNIFICANT CHANGE UP (ref 0–2)
BUN SERPL-MCNC: 20 MG/DL — SIGNIFICANT CHANGE UP (ref 7–23)
BUN SERPL-MCNC: 20 MG/DL — SIGNIFICANT CHANGE UP (ref 7–23)
CALCIUM SERPL-MCNC: 9.4 MG/DL — SIGNIFICANT CHANGE UP (ref 8.4–10.5)
CALCIUM SERPL-MCNC: 9.4 MG/DL — SIGNIFICANT CHANGE UP (ref 8.4–10.5)
CHLORIDE SERPL-SCNC: 105 MMOL/L — SIGNIFICANT CHANGE UP (ref 96–108)
CHLORIDE SERPL-SCNC: 105 MMOL/L — SIGNIFICANT CHANGE UP (ref 96–108)
CO2 SERPL-SCNC: 25 MMOL/L — SIGNIFICANT CHANGE UP (ref 22–31)
CO2 SERPL-SCNC: 25 MMOL/L — SIGNIFICANT CHANGE UP (ref 22–31)
CREAT SERPL-MCNC: 1.26 MG/DL — SIGNIFICANT CHANGE UP (ref 0.5–1.3)
CREAT SERPL-MCNC: 1.26 MG/DL — SIGNIFICANT CHANGE UP (ref 0.5–1.3)
EGFR: 62 ML/MIN/1.73M2 — SIGNIFICANT CHANGE UP
EGFR: 62 ML/MIN/1.73M2 — SIGNIFICANT CHANGE UP
EOSINOPHIL # BLD AUTO: 0.04 K/UL — SIGNIFICANT CHANGE UP (ref 0–0.5)
EOSINOPHIL # BLD AUTO: 0.04 K/UL — SIGNIFICANT CHANGE UP (ref 0–0.5)
EOSINOPHIL NFR BLD AUTO: 0.4 % — SIGNIFICANT CHANGE UP (ref 0–6)
EOSINOPHIL NFR BLD AUTO: 0.4 % — SIGNIFICANT CHANGE UP (ref 0–6)
GLUCOSE SERPL-MCNC: 104 MG/DL — HIGH (ref 70–99)
GLUCOSE SERPL-MCNC: 104 MG/DL — HIGH (ref 70–99)
HCT VFR BLD CALC: 46.4 % — SIGNIFICANT CHANGE UP (ref 39–50)
HCT VFR BLD CALC: 46.4 % — SIGNIFICANT CHANGE UP (ref 39–50)
HGB BLD-MCNC: 14.7 G/DL — SIGNIFICANT CHANGE UP (ref 13–17)
HGB BLD-MCNC: 14.7 G/DL — SIGNIFICANT CHANGE UP (ref 13–17)
IMM GRANULOCYTES NFR BLD AUTO: 0.4 % — SIGNIFICANT CHANGE UP (ref 0–0.9)
IMM GRANULOCYTES NFR BLD AUTO: 0.4 % — SIGNIFICANT CHANGE UP (ref 0–0.9)
INR BLD: 0.9 — SIGNIFICANT CHANGE UP (ref 0.85–1.18)
INR BLD: 0.9 — SIGNIFICANT CHANGE UP (ref 0.85–1.18)
LITHIUM SERPL-MCNC: 0.99 MMOL/L — SIGNIFICANT CHANGE UP (ref 0.6–1.2)
LITHIUM SERPL-MCNC: 0.99 MMOL/L — SIGNIFICANT CHANGE UP (ref 0.6–1.2)
LYMPHOCYTES # BLD AUTO: 3.95 K/UL — HIGH (ref 1–3.3)
LYMPHOCYTES # BLD AUTO: 3.95 K/UL — HIGH (ref 1–3.3)
LYMPHOCYTES # BLD AUTO: 42.6 % — SIGNIFICANT CHANGE UP (ref 13–44)
LYMPHOCYTES # BLD AUTO: 42.6 % — SIGNIFICANT CHANGE UP (ref 13–44)
MCHC RBC-ENTMCNC: 27.9 PG — SIGNIFICANT CHANGE UP (ref 27–34)
MCHC RBC-ENTMCNC: 27.9 PG — SIGNIFICANT CHANGE UP (ref 27–34)
MCHC RBC-ENTMCNC: 31.7 GM/DL — LOW (ref 32–36)
MCHC RBC-ENTMCNC: 31.7 GM/DL — LOW (ref 32–36)
MCV RBC AUTO: 88.2 FL — SIGNIFICANT CHANGE UP (ref 80–100)
MCV RBC AUTO: 88.2 FL — SIGNIFICANT CHANGE UP (ref 80–100)
MONOCYTES # BLD AUTO: 0.83 K/UL — SIGNIFICANT CHANGE UP (ref 0–0.9)
MONOCYTES # BLD AUTO: 0.83 K/UL — SIGNIFICANT CHANGE UP (ref 0–0.9)
MONOCYTES NFR BLD AUTO: 8.9 % — SIGNIFICANT CHANGE UP (ref 2–14)
MONOCYTES NFR BLD AUTO: 8.9 % — SIGNIFICANT CHANGE UP (ref 2–14)
NEUTROPHILS # BLD AUTO: 4.39 K/UL — SIGNIFICANT CHANGE UP (ref 1.8–7.4)
NEUTROPHILS # BLD AUTO: 4.39 K/UL — SIGNIFICANT CHANGE UP (ref 1.8–7.4)
NEUTROPHILS NFR BLD AUTO: 47.4 % — SIGNIFICANT CHANGE UP (ref 43–77)
NEUTROPHILS NFR BLD AUTO: 47.4 % — SIGNIFICANT CHANGE UP (ref 43–77)
NRBC # BLD: 0 /100 WBCS — SIGNIFICANT CHANGE UP (ref 0–0)
NRBC # BLD: 0 /100 WBCS — SIGNIFICANT CHANGE UP (ref 0–0)
PLATELET # BLD AUTO: 168 K/UL — SIGNIFICANT CHANGE UP (ref 150–400)
PLATELET # BLD AUTO: 168 K/UL — SIGNIFICANT CHANGE UP (ref 150–400)
POTASSIUM SERPL-MCNC: 3.9 MMOL/L — SIGNIFICANT CHANGE UP (ref 3.5–5.3)
POTASSIUM SERPL-MCNC: 3.9 MMOL/L — SIGNIFICANT CHANGE UP (ref 3.5–5.3)
POTASSIUM SERPL-SCNC: 3.9 MMOL/L — SIGNIFICANT CHANGE UP (ref 3.5–5.3)
POTASSIUM SERPL-SCNC: 3.9 MMOL/L — SIGNIFICANT CHANGE UP (ref 3.5–5.3)
PROTHROM AB SERPL-ACNC: 10.3 SEC — SIGNIFICANT CHANGE UP (ref 9.5–13)
PROTHROM AB SERPL-ACNC: 10.3 SEC — SIGNIFICANT CHANGE UP (ref 9.5–13)
RBC # BLD: 5.26 M/UL — SIGNIFICANT CHANGE UP (ref 4.2–5.8)
RBC # BLD: 5.26 M/UL — SIGNIFICANT CHANGE UP (ref 4.2–5.8)
RBC # FLD: 14.6 % — HIGH (ref 10.3–14.5)
RBC # FLD: 14.6 % — HIGH (ref 10.3–14.5)
SODIUM SERPL-SCNC: 139 MMOL/L — SIGNIFICANT CHANGE UP (ref 135–145)
SODIUM SERPL-SCNC: 139 MMOL/L — SIGNIFICANT CHANGE UP (ref 135–145)
TROPONIN T, HIGH SENSITIVITY RESULT: 12 NG/L — SIGNIFICANT CHANGE UP (ref 0–51)
WBC # BLD: 9.28 K/UL — SIGNIFICANT CHANGE UP (ref 3.8–10.5)
WBC # BLD: 9.28 K/UL — SIGNIFICANT CHANGE UP (ref 3.8–10.5)
WBC # FLD AUTO: 9.28 K/UL — SIGNIFICANT CHANGE UP (ref 3.8–10.5)
WBC # FLD AUTO: 9.28 K/UL — SIGNIFICANT CHANGE UP (ref 3.8–10.5)

## 2024-01-15 PROCEDURE — 80048 BASIC METABOLIC PNL TOTAL CA: CPT

## 2024-01-15 PROCEDURE — 85610 PROTHROMBIN TIME: CPT

## 2024-01-15 PROCEDURE — 85730 THROMBOPLASTIN TIME PARTIAL: CPT

## 2024-01-15 PROCEDURE — 84484 ASSAY OF TROPONIN QUANT: CPT

## 2024-01-15 PROCEDURE — 99285 EMERGENCY DEPT VISIT HI MDM: CPT

## 2024-01-15 PROCEDURE — 71045 X-RAY EXAM CHEST 1 VIEW: CPT

## 2024-01-15 PROCEDURE — 99283 EMERGENCY DEPT VISIT LOW MDM: CPT | Mod: 25

## 2024-01-15 PROCEDURE — 71045 X-RAY EXAM CHEST 1 VIEW: CPT | Mod: 26

## 2024-01-15 PROCEDURE — 85025 COMPLETE CBC W/AUTO DIFF WBC: CPT

## 2024-01-15 PROCEDURE — 36415 COLL VENOUS BLD VENIPUNCTURE: CPT

## 2024-01-15 PROCEDURE — 80178 ASSAY OF LITHIUM: CPT

## 2024-01-15 RX ORDER — ONDANSETRON 8 MG/1
4 TABLET, FILM COATED ORAL ONCE
Refills: 0 | Status: COMPLETED | OUTPATIENT
Start: 2024-01-15 | End: 2024-01-15

## 2024-01-15 RX ADMIN — ONDANSETRON 4 MILLIGRAM(S): 8 TABLET, FILM COATED ORAL at 12:51

## 2024-01-15 NOTE — ED PROVIDER NOTE - NSFOLLOWUPINSTRUCTIONS_ED_ALL_ED_FT
Please return for any worsening symptoms, chest pain, shortness of breath, palpitations, lightheadedness, dizziness. Please follow up closely with your primary physician and cardiologist.     I hope you feel better soon!    Sincerely,  Suraj Vivas MD

## 2024-01-15 NOTE — ED PROVIDER NOTE - CLINICAL SUMMARY MEDICAL DECISION MAKING FREE TEXT BOX
68-year-old male presenting with palpitations, chest discomfort, EKG nonischemic, nonpleuritic in nature, no shortness of breath, no tachypnea, no tachycardia, do not suspect PE, symmetric pulses on exam, well-appearing, not hypertensive, do not suspect aortic pathology.  Will check labs including troponin for ACS rule out, chest x-ray, reassess.

## 2024-01-15 NOTE — ED ADULT NURSE NOTE - OBJECTIVE STATEMENT
Patient states woke up this morning w/ palpitations, intermittent transient chest pain, no SOB, no diaphoresis, no nausea/vomitting, last episode of chest pain 2 hours ago.  Patient w/ Hx of aortic aneurysm.  EKG NSR in ED.

## 2024-01-15 NOTE — ED PROVIDER NOTE - NS ED ROS FT
Constitutional: No fever or chills  Eyes: No discharge or drainage  Ears, Nose, Mouth, Throat: No nasal discharge, no sore throat  Cardiovascular: +chest pain, +palpitations  Respiratory: No shortness of breath, no cough  Gastrointestinal: No nausea or vomiting, no abdominal pain, no diarrhea or constipation  Musculoskeletal: No joint pain, no swelling  Skin: No rashes or lesions  Neurological: No numbness, weakness, tingling, no headache  Psychiatric: No depression

## 2024-01-15 NOTE — ED ADULT NURSE REASSESSMENT NOTE - NS ED NURSE REASSESS COMMENT FT1
Patient a/oX 3, resting quietly, no chest pain or SOB, no symptom complaint.  NSR on cardiac monitor, vital signs s table.  Repeat Trop sent to lab.  REsults and disposition pen ding.

## 2024-01-15 NOTE — ED ADULT NURSE REASSESSMENT NOTE - NS ED NURSE REASSESS COMMENT FT1
Patient a/oX 4, calm, currently no chest  pain, no SOB, no dizziness/lightheadedness, no new symptom complaint.  NSR on cardiac monitor, vital signs stable.  Left AC PIV #20 in place, all labs sent, no complications.  Xray pending.  STable and comfortable.

## 2024-01-15 NOTE — ED PROVIDER NOTE - OBJECTIVE STATEMENT
68-year-old male with history of large cell leukemia, giant cell arteritis no longer on methotrexate, being downtitrated on prednisone, presenting with palpitations, chest pain. Pt states 4 hours PTA, had transient episode of left sided chest discomfort with associated palpitations. Occurred again 2 hours PTA. Denies fever, chills, cough, nausea, vomiting, sob, lightheadedness, dizziness, recent travel, history of dvt or pe. States he does have a known aneursym by his ascending aorta, last CTA done 8 months PTA. ROS as above.

## 2024-01-15 NOTE — ED PROVIDER NOTE - PATIENT PORTAL LINK FT
You can access the FollowMyHealth Patient Portal offered by [facility] by registering at the following website: [email or web address]. By joining [employer]’s FollowMyHealth portal, you will also be able to view your health information using other applications (apps) compatible with our system. You can access the FollowMyHealth Patient Portal offered by [facility] by registering at the following website: [email or web address]. By joining [employer]’s FollowMyHealth portal, you will also be able to view your health information using other applications (apps) compatible with our system. You can access the FollowMyHealth Patient Portal offered by [facility] by registering at the following website: [email or web address]. By joining [employer]’s FollowMyHealth portal, you will also be able to view your health information using other applications (apps) compatible with our system.

## 2024-01-15 NOTE — ED ADULT NURSE NOTE - NSFALLUNIVINTERV_ED_ALL_ED
Bed/Stretcher in lowest position, wheels locked, appropriate side rails in place/Call bell, personal items and telephone in reach/Instruct patient to call for assistance before getting out of bed/chair/stretcher/Non-slip footwear applied when patient is off stretcher/[place] to call system/Physically safe environment - no spills, clutter or unnecessary equipment/Purposeful proactive rounding/Room/bathroom lighting operational, light cord in reach Bed/Stretcher in lowest position, wheels locked, appropriate side rails in place/Call bell, personal items and telephone in reach/Instruct patient to call for assistance before getting out of bed/chair/stretcher/Non-slip footwear applied when patient is off stretcher/[place] to call system/Physically safe environment - no spills, clutter or unnecessary equipment/Purposeful proactive rounding/Room/bathroom lighting operational, light cord in reach Bed/Stretcher in lowest position, wheels locked, appropriate side rails in place/Call bell, personal items and telephone in reach/Instruct patient to call for assistance before getting out of bed/chair/stretcher/Non-slip footwear applied when patient is off stretcher/[place] to call system/Physically safe environment - no spills, clutter or unnecessary equipment/Purposeful proactive rounding/Room/bathroom lighting operational, light cord in reach

## 2024-01-15 NOTE — ED PROVIDER NOTE - PROGRESS NOTE DETAILS
pt without any symptoms currently. discussed results with patient, will fu with his cardiologist. given return precautions.

## 2024-01-15 NOTE — ED ADULT NURSE REASSESSMENT NOTE - NS ED NURSE REASSESS COMMENT FT1
All labs, tests resulted, no chest pain or SOB or any new symptom complaint.  Vital signs stble.  Discharged to home in stable condition.

## 2024-01-15 NOTE — ED ADULT NURSE REASSESSMENT NOTE - NS ED NURSE REASSESS COMMENT FT1
Patient c/o of dizziness, re-evalauted by Dr. Najera.  VItal signs stable.  Meclizine 25mg PO adminsitered; KPHOS tab adminsitered for low phosphorus.  No new neuro deficits.  Brought to CT scan in stable condition.

## 2024-01-15 NOTE — ED PROVIDER NOTE - PHYSICAL EXAMINATION
general: Well appearing, in no acute distress  HEENT: Normocephalic, atraumatic, extraocular movements intact  CV: Regular rate, rhythm  Pulm: No respiratory distress, no tachypnea, CTA B, no W RR  Abd: Flat, no gross distension, soft, nontender, no rebound or guarding  Ext: warm and well perfused, no lower extremity edema, 2+ DP pulses bilaterally  Skin: No gross rashes or lesions  Neuro: Alert and oriented, moving all extremities

## 2024-01-15 NOTE — ED ADULT TRIAGE NOTE - CHIEF COMPLAINT QUOTE
Pt presents to ED C/O intermittent palpitations with L CP starting this AM for about 4 hrs PTA as per pt. Hx of Aortic aneurism. Denies known arrhythmia. Reports last CTA x 8 months ago. EKG in progress.

## 2024-01-23 ENCOUNTER — APPOINTMENT (OUTPATIENT)
Dept: PAIN MANAGEMENT | Facility: CLINIC | Age: 69
End: 2024-01-23
Payer: COMMERCIAL

## 2024-01-23 VITALS
RESPIRATION RATE: 79 BRPM | DIASTOLIC BLOOD PRESSURE: 77 MMHG | WEIGHT: 141 LBS | BODY MASS INDEX: 21.37 KG/M2 | SYSTOLIC BLOOD PRESSURE: 144 MMHG | HEIGHT: 68 IN | OXYGEN SATURATION: 99 %

## 2024-01-23 PROCEDURE — 64510 N BLOCK STELLATE GANGLION: CPT | Mod: LT

## 2024-01-23 PROCEDURE — 99213 OFFICE O/P EST LOW 20 MIN: CPT | Mod: 25

## 2024-01-24 NOTE — HISTORY OF PRESENT ILLNESS
[FreeTextEntry1] : Patient is a 68 year old male with pmhx of chronic cluster migraine headaches, presenting with left sided neck pain and left sided facial pain. Pain is felt on left side of neck radiating to scapula, as well as left sided facial pain and left eye pain which feels like a heaviness and cloudiness above his eye lids. He reports feeling constant pain which is worse with movement. The cluster headaches are accompanied by feelings of nausea and mood agitation at times. He has recently been started on low dose Lithium which has improved his symptoms but not completely. He does stretching at home which helps minimally.   Rajan is s/p CMBB C2-4 on 11/30/23 and 1/9/24.  Following these procedures, he had 80 percent relief of neck and head pain.  he was able to take less pain medications and tolerate his activities of daily living with less pain.  He was able to concentrate more and perform more computer work without pain limiting.

## 2024-02-07 ENCOUNTER — APPOINTMENT (OUTPATIENT)
Dept: PAIN MANAGEMENT | Facility: CLINIC | Age: 69
End: 2024-02-07
Payer: COMMERCIAL

## 2024-02-07 VITALS — HEART RATE: 63 BPM | OXYGEN SATURATION: 98 % | SYSTOLIC BLOOD PRESSURE: 122 MMHG | DIASTOLIC BLOOD PRESSURE: 69 MMHG

## 2024-02-07 DIAGNOSIS — M54.81 OCCIPITAL NEURALGIA: ICD-10-CM

## 2024-02-07 PROCEDURE — 64405 NJX AA&/STRD GR OCPL NRV: CPT | Mod: LT

## 2024-02-13 ENCOUNTER — APPOINTMENT (OUTPATIENT)
Dept: PAIN MANAGEMENT | Facility: CLINIC | Age: 69
End: 2024-02-13
Payer: COMMERCIAL

## 2024-02-13 DIAGNOSIS — G43.711 CHRONIC MIGRAINE W/OUT AURA, INTRACTABLE, WITH STATUS MIGRAINOSUS: ICD-10-CM

## 2024-02-13 DIAGNOSIS — G44.009 CLUSTER HEADACHE SYNDROME, UNSPECIFIED, NOT INTRACTABLE: ICD-10-CM

## 2024-02-13 DIAGNOSIS — G44.51 HEMICRANIA CONTINUA: ICD-10-CM

## 2024-02-13 PROCEDURE — 99205 OFFICE O/P NEW HI 60 MIN: CPT | Mod: 95

## 2024-03-12 ENCOUNTER — APPOINTMENT (OUTPATIENT)
Dept: PAIN MANAGEMENT | Facility: CLINIC | Age: 69
End: 2024-03-12
Payer: COMMERCIAL

## 2024-03-12 PROBLEM — G44.51 HEMICRANIA CONTINUA: Status: ACTIVE | Noted: 2024-03-12

## 2024-03-12 PROBLEM — G43.711 INTRACTABLE CHRONIC MIGRAINE WITHOUT AURA AND WITH STATUS MIGRAINOSUS: Status: ACTIVE | Noted: 2024-03-12

## 2024-03-12 PROBLEM — G44.009 CLUSTER HEADACHES: Status: ACTIVE | Noted: 2020-12-02

## 2024-03-12 PROCEDURE — 99214 OFFICE O/P EST MOD 30 MIN: CPT | Mod: 95

## 2024-03-12 NOTE — ASSESSMENT
[FreeTextEntry1] : Pt with 6 years of progressive unilateral pain with significant impairment and mild to moderate autonomic features. Complicated by comorbid psychiatric and medical conditions. Rapid cycling on and off of treatments due to efficacy and se's to treatment (potential medication interactions.) Would recommend consultation with headache psychologist- consider for CBT vs biofeedback training. Discussed need for longer term trial of medications.  Would discuss with psychiatrist consideration of use of desvenlafaxine, mirtazapine or Fetzima. Reinforced need to remain on medications for extended period. Consider use of carbamazepine, sodium valproate, zonisamide, candesartan. Consider retrial of Qulipta for 6-8 weeks. COnsider use of high dose Vyepti.

## 2024-03-12 NOTE — PHYSICAL EXAM
[General Appearance - Alert] : alert [General Appearance - Well Nourished] : well nourished [Impaired Insight] : insight and judgment were intact [Oriented To Time, Place, And Person] : oriented to person, place, and time [Memory Remote] : remote memory was not impaired [Memory Recent] : recent memory was not impaired [Person] : oriented to person [Time] : oriented to time [Place] : oriented to place [Short Term Intact] : short term memory intact [Remote Intact] : remote memory intact [Visual Intact] : visual attention was ~T not ~L decreased [Registration Intact] : recent registration memory intact [Writing A Sentence] : no difficulty writing a sentence [Fluency] : fluency intact [Naming Objects] : no difficulty naming common objects [Comprehension] : comprehension intact [Reading] : reading intact [Current Events] : adequate knowledge of current events [Past History] : adequate knowledge of personal past history [Cranial Nerves Oculomotor (III)] : extraocular motion intact [Vocabulary] : adequate range of vocabulary [Cranial Nerves Vestibulocochlear (VIII)] : hearing was intact bilaterally [Cranial Nerves Facial (VII)] : face symmetrical [Cranial Nerves Hypoglossal (XII)] : there was no tongue deviation with protrusion [Cranial Nerves Accessory (XI - Cranial And Spinal)] : head turning and shoulder shrug symmetric [No Muscle Atrophy] : normal bulk in all four extremities [Sclera] : the sclera and conjunctiva were normal [No ADAMS] : no internuclear ophthalmoplegia [Strabismus] : no strabismus was seen [Outer Ear] : the ears and nose were normal in appearance [Hearing Threshold Finger Rub Not Power] : hearing was normal [Neck Appearance] : the appearance of the neck was normal [Neck Cervical Mass (___cm)] : no neck mass was observed [Exaggerated Use Of Accessory Muscles For Inspiration] : no accessory muscle use [Nail Clubbing] : no clubbing  or cyanosis of the fingernails [Involuntary Movements] : no involuntary movements were seen [Skin Color & Pigmentation] : normal skin color and pigmentation [] : no rash [FreeTextEntry1] : depressed affect [Motor Strength Upper Extremities Bilaterally] : strength was normal in both upper extremities [Dysdiadochokinesia Bilaterally] : not present

## 2024-03-12 NOTE — REVIEW OF SYSTEMS
[Feeling Poorly] : feeling poorly [Feeling Tired] : feeling tired [Recent Weight Loss (___ Lbs)] : recent [unfilled] ~Ulb weight loss [Drooping] : drooping [Eye Pain] : eye pain [Nasal Discharge] : nasal discharge [Arthralgias] : arthralgias [Neck Pain] : neck pain [As Noted in HPI] : as noted in HPI [Depression] : depression [Fever] : no fever [Chills] : no chills [Recent Weight Gain (___ Lbs)] : no recent weight gain [Eyesight Problems] : no eyesight problems [Loss Of Hearing] : no hearing loss [Nosebleeds] : no nosebleeds [Sore Throat] : no sore throat [Chest Pain] : no chest pain [Palpitations] : no palpitations [Shortness Of Breath] : no shortness of breath [Cough] : no cough [Skin Lesions] : no skin lesions [Itching] : no itching [Confused] : no confusion [Convulsions] : no convulsions [Fainting] : no fainting [Muscle Weakness] : no muscle weakness [Swollen Glands] : no swollen glands [de-identified] : + suicidal ideation but denies intent or plan and contracts for safety.

## 2024-03-12 NOTE — HISTORY OF PRESENT ILLNESS
[Home] : at home, [unfilled] , at the time of the visit. [Medical Office: ([facility])___] : at the medical office located in  [Chronic Headache] : chronic headache [Verbal consent obtained from patient] : the patient, [unfilled] [Nausea] : nausea [Photophobia] : photophobia [Phonophobia] : phonophobia [Scalp Tenderness] : scalp tenderness [Daily] : daily [Worsened] : The patient reports ~his/her~ symptoms since the last visit have worsened [< 4 hours] : < 4 hours [FreeTextEntry1] : Pt is a 69 year old man who notes 6 years of high frequency/ daily head pain consisting of focal discomfort in left temporal area.  No noted palpable vessel. Does note related worsened heat and cold intolerance as well as concurrent nausea and left eye droopiness. Occasionally has tearing and ? nasal congestion/ drainage with events as well. (unclear discrete episodes.) Also noted profound fatigue with this and overall "ill feeling." Does have co-occurrent 28 lb weight loss, worsened mood, fatigue and nausea recently.  Does relate mother with headache disorder as well as brother.  ? Mother treated with venlafaxine with good response. He has had multiple evaluations for his headache condition including with Dr. Souhel Najjar as well as multiple neurologists, internists and other health providers.  Was diagnosed with giant cell arteritis and only noted to have mild inflammation of his aorta as part of that evaluation.  (Does note the events he has used to be shorter in past.)  He has seen some benefit with prednisone in past and did recently start 40mg prednisone with plan for taper.  Also currently on low dose suboxone for his headache and pain disorder.  REcalls trial with inderal leading to agitation and anxiety.  Was on Nurtec for several weeks with no effect.  QUlipta 60mg with no benefit after 1 week.  Emgality for unclear period without benefit.  NEgative benefit with Gammacore.  Was injected at c3/c4 by Dr. Caron Pearson without benefit.  Similarly, ONB without clear effect.  Over time he has also had other medical conditions including CLL with pancytopenia and negative inflammatory markers.  He also has had profound episodic depression.  Has been treated for this with multiple medications as well as other methods.  Unclear how much of that may be endogenous or entangled with headache disorder as an adjustment disorder with depressed features. He has been tried on SSRI's in past with side effects.  Also given venlafaxine with side effects.  Has been treated recently at [facility] where he had been treated with ECT, Ketamine, low dose psilocybin, Li and multiple other medications. Sleep also impaired due to anxiety, rumination and frequent nightime awakening. [Aura] : no aura

## 2024-03-24 NOTE — ASSESSMENT
[FreeTextEntry1] : Would plan for gradual change in Venlafaxine to 150mg and potentially to 225mg. Monitor for isaura/ hypomania. COnsider use of CGRP antagonism.

## 2024-03-24 NOTE — HISTORY OF PRESENT ILLNESS
[Chronic Headache] : chronic headache [FreeTextEntry1] : Pt notes several changes since his last visit. He has continued on ketamine, Li" and rapidly titrated up on venlafaxine.   Due to feeling of discomfort hehad come down rapidly on Li+ with concurrent worsening of shakiness and generalized ill feeling.   After dose of Li+ yesterday felt better on 150mg.  Dr. Costa also notes he has had quick muscular jerking movements- likely myoclonic jerking in association. Does note difficulty with memory as well and we discussed role of sleep, mood, medications, pain and general medical condition.    Understands that it is difficult to find an appropriate balance with medications and we discussed regulated changes. No other clear change in headaches separate from mood issues.  No change in associated features or red flags.  No new neurological features.  [Photophobia] : photophobia [Nausea] : nausea [Phonophobia] : phonophobia [Neck Pain] : neck pain [Scalp Tenderness] : scalp tenderness [> 15 days per month] : > 15 days per month

## 2024-03-24 NOTE — PHYSICAL EXAM
[General Appearance - Alert] : alert [General Appearance - Well Nourished] : well nourished [Oriented To Time, Place, And Person] : oriented to person, place, and time [Impaired Insight] : insight and judgment were intact [Memory Remote] : remote memory was not impaired [Memory Recent] : recent memory was not impaired [FreeTextEntry1] : depressed affect [Person] : oriented to person [Place] : oriented to place [Time] : oriented to time [Short Term Intact] : short term memory intact [Remote Intact] : remote memory intact [Registration Intact] : recent registration memory intact [Visual Intact] : visual attention was ~T not ~L decreased [Naming Objects] : no difficulty naming common objects [Fluency] : fluency intact [Writing A Sentence] : no difficulty writing a sentence [Comprehension] : comprehension intact [Reading] : reading intact [Current Events] : adequate knowledge of current events [Past History] : adequate knowledge of personal past history [Vocabulary] : adequate range of vocabulary [Cranial Nerves Oculomotor (III)] : extraocular motion intact [Cranial Nerves Facial (VII)] : face symmetrical [Cranial Nerves Vestibulocochlear (VIII)] : hearing was intact bilaterally [Cranial Nerves Hypoglossal (XII)] : there was no tongue deviation with protrusion [Cranial Nerves Accessory (XI - Cranial And Spinal)] : head turning and shoulder shrug symmetric [No Muscle Atrophy] : normal bulk in all four extremities [Motor Strength Upper Extremities Bilaterally] : strength was normal in both upper extremities [Dysdiadochokinesia Bilaterally] : not present [Sclera] : the sclera and conjunctiva were normal [No ADAMS] : no internuclear ophthalmoplegia [Strabismus] : no strabismus was seen [Outer Ear] : the ears and nose were normal in appearance [Hearing Threshold Finger Rub Not [place]] : hearing was normal [Neck Appearance] : the appearance of the neck was normal [Neck Cervical Mass (___cm)] : no neck mass was observed [Exaggerated Use Of Accessory Muscles For Inspiration] : no accessory muscle use [Nail Clubbing] : no clubbing  or cyanosis of the fingernails [Involuntary Movements] : no involuntary movements were seen [Skin Color & Pigmentation] : normal skin color and pigmentation [] : no rash

## 2024-03-24 NOTE — REASON FOR VISIT
[Follow-Up: _____] : a [unfilled] follow-up visit [Home] : at home, [unfilled] , at the time of the visit. [Medical Office: ([facility])___] : at the medical office located in

## 2024-03-24 NOTE — REVIEW OF SYSTEMS
[Fever] : no fever [Feeling Poorly] : feeling poorly [Feeling Tired] : not feeling tired [Eye Pain] : eye pain [Eyesight Problems] : no eyesight problems [Chest Pain] : no chest pain [Nasal Discharge] : no nasal discharge [Palpitations] : no palpitations [Shortness Of Breath] : no shortness of breath [Cough] : no cough [Constipation] : no constipation [Skin Lesions] : no skin lesions [Itching] : no itching [Confused] : confusion [Convulsions] : no convulsions [Dizziness] : no dizziness [Fainting] : no fainting [Muscle Weakness] : no muscle weakness [As Noted in HPI] : as noted in HPI

## 2024-04-01 RX ORDER — RIMEGEPANT SULFATE 75 MG/75MG
75 TABLET, ORALLY DISINTEGRATING ORAL
Qty: 30 | Refills: 12 | Status: ACTIVE | COMMUNITY
Start: 2023-07-28 | End: 1900-01-01

## 2024-04-02 ENCOUNTER — NON-APPOINTMENT (OUTPATIENT)
Age: 69
End: 2024-04-02

## 2024-04-03 LAB
CHOLEST SERPL-MCNC: 153 MG/DL
HDLC SERPL-MCNC: 67 MG/DL
LDLC SERPL CALC-MCNC: 58 MG/DL
NONHDLC SERPL-MCNC: 86 MG/DL
TRIGL SERPL-MCNC: 177 MG/DL

## 2024-04-03 RX ORDER — EVOLOCUMAB 420 MG/3.5
420 KIT SUBCUTANEOUS
Qty: 3 | Refills: 6 | Status: ACTIVE | COMMUNITY
Start: 2019-04-15

## 2024-04-04 LAB
ALBUMIN SERPL ELPH-MCNC: 4.3 G/DL
ALP BLD-CCNC: 84 U/L
ALT SERPL-CCNC: 26 U/L
ANION GAP SERPL CALC-SCNC: 19 MMOL/L
AST SERPL-CCNC: 25 U/L
BILIRUB SERPL-MCNC: 0.4 MG/DL
BUN SERPL-MCNC: 22 MG/DL
CALCIUM SERPL-MCNC: 9.4 MG/DL
CHLORIDE SERPL-SCNC: 104 MMOL/L
CO2 SERPL-SCNC: 20 MMOL/L
CREAT SERPL-MCNC: 1.4 MG/DL
CRP SERPL-MCNC: <3 MG/L
CYSTATIN C SERPL-MCNC: 1.44 MG/L
EGFR: 54 ML/MIN/1.73M2
ERYTHROCYTE [SEDIMENTATION RATE] IN BLOOD BY WESTERGREN METHOD: 12 MM/HR
GFR/BSA.PRED SERPLBLD CYS-BASED-ARV: 46 ML/MIN/1.73M2
GLUCOSE SERPL-MCNC: 112 MG/DL
IL6 SERPL-MCNC: 3 PG/ML
LITHIUM SERPL-SCNC: 0.5 MMOL/L
POTASSIUM SERPL-SCNC: 4.4 MMOL/L
PROT SERPL-MCNC: 6.1 G/DL
SODIUM SERPL-SCNC: 143 MMOL/L
TESTOST FREE SERPL-MCNC: 7.1 PG/ML
TESTOST SERPL-MCNC: 581 NG/DL

## 2024-04-05 LAB — APO LP(A) SERPL-MCNC: 206.9 NMOL/L

## 2024-04-23 ENCOUNTER — APPOINTMENT (OUTPATIENT)
Dept: NEUROLOGY | Facility: CLINIC | Age: 69
End: 2024-04-23
Payer: COMMERCIAL

## 2024-04-23 VITALS
DIASTOLIC BLOOD PRESSURE: 63 MMHG | HEART RATE: 64 BPM | SYSTOLIC BLOOD PRESSURE: 112 MMHG | TEMPERATURE: 97.4 F | OXYGEN SATURATION: 96 %

## 2024-04-23 DIAGNOSIS — D89.89 OTHER SPECIFIED DISORDERS INVOLVING THE IMMUNE MECHANISM, NOT ELSEWHERE CLASSIFIED: ICD-10-CM

## 2024-04-23 PROCEDURE — 99214 OFFICE O/P EST MOD 30 MIN: CPT

## 2024-04-23 PROCEDURE — G2211 COMPLEX E/M VISIT ADD ON: CPT

## 2024-04-23 RX ORDER — OLANZAPINE 5 MG/1
5 TABLET, FILM COATED ORAL DAILY
Qty: 30 | Refills: 5 | Status: DISCONTINUED | COMMUNITY
Start: 2023-07-28 | End: 2024-04-23

## 2024-04-23 RX ORDER — OLANZAPINE 2.5 MG/1
2.5 TABLET, FILM COATED ORAL DAILY
Qty: 15 | Refills: 1 | Status: DISCONTINUED | COMMUNITY
Start: 2023-08-11 | End: 2024-04-23

## 2024-04-23 RX ORDER — DEXTROMETHORPHAN HYDROBROMIDE, BUPROPION HYDROCHLORIDE 105; 45 MG/1; MG/1
45-105 TABLET, MULTILAYER, EXTENDED RELEASE ORAL
Qty: 120 | Refills: 0 | Status: DISCONTINUED | COMMUNITY
Start: 2023-09-08 | End: 2024-04-23

## 2024-04-23 RX ORDER — LITHIUM CARBONATE 150 MG/1
150 CAPSULE ORAL
Qty: 270 | Refills: 3 | Status: ACTIVE | COMMUNITY
Start: 2020-10-16

## 2024-04-23 RX ORDER — PREDNISONE 10 MG/1
10 TABLET ORAL
Qty: 120 | Refills: 0 | Status: ACTIVE | COMMUNITY
Start: 2023-07-20

## 2024-04-23 RX ORDER — BUPRENORPHINE HYDROCHLORIDE 300 UG/1
300 FILM, SOLUBLE BUCCAL
Qty: 1 | Refills: 0 | Status: DISCONTINUED | COMMUNITY
Start: 2023-09-27 | End: 2024-04-23

## 2024-04-23 RX ORDER — BUPRENORPHINE HYDROCHLORIDE, NALOXONE HYDROCHLORIDE 2; .5 MG/1; MG/1
2-0.5 FILM, SOLUBLE BUCCAL; SUBLINGUAL
Refills: 0 | Status: ACTIVE | COMMUNITY
Start: 2024-04-23

## 2024-04-23 RX ORDER — GABAPENTIN 300 MG/1
300 CAPSULE ORAL
Qty: 30 | Refills: 1 | Status: DISCONTINUED | COMMUNITY
Start: 2023-07-20 | End: 2024-04-23

## 2024-04-23 RX ORDER — AMANTADINE HYDROCHLORIDE 100 MG/1
100 CAPSULE ORAL
Qty: 60 | Refills: 0 | Status: ACTIVE | COMMUNITY
Start: 2024-04-23 | End: 1900-01-01

## 2024-04-23 RX ORDER — ATOGEPANT 60 MG/1
60 TABLET ORAL
Qty: 90 | Refills: 3 | Status: DISCONTINUED | COMMUNITY
Start: 2022-02-14 | End: 2024-04-23

## 2024-04-23 RX ORDER — LASMIDITAN 50 MG/1
50 TABLET ORAL DAILY
Qty: 30 | Refills: 3 | Status: DISCONTINUED | COMMUNITY
Start: 2021-07-21 | End: 2024-04-23

## 2024-04-23 RX ORDER — ANDEXANET ALFA 200 MG/20ML
200 INJECTION, POWDER, LYOPHILIZED, FOR SOLUTION INTRAVENOUS
Qty: 1 | Refills: 0 | Status: DISCONTINUED | COMMUNITY
Start: 2023-11-20 | End: 2024-04-23

## 2024-04-23 RX ORDER — INDOMETHACIN 25 MG/1
25 CAPSULE ORAL 3 TIMES DAILY
Qty: 270 | Refills: 0 | Status: DISCONTINUED | COMMUNITY
Start: 2023-05-01 | End: 2024-04-23

## 2024-04-24 NOTE — HISTORY OF PRESENT ILLNESS
[FreeTextEntry1] : Mr. CHRISTENSEN presents today for a follow up visit.  Seen by Dr. Alonso in March 2024. No med adjustments made. Effexor continued.   Continues to follow with psychiatry. Receive Ketamine infusions every 2 weeks, next scheduled at the end of the week. Patient reports possibly changing IV Ketamine to Oral. Feels is helping symptoms. Feels his worst around 4pm at which time he reports blurry vision, brain fog and fatigue.

## 2024-04-24 NOTE — DISCUSSION/SUMMARY
[FreeTextEntry1] : Symptoms of mood changes, isaura, hypomania, brain fog, headaches, nausea, fatigue, exhaustion with sleep disturbances.   Amantadine may be beneficial given its pro-dopaminergic and anti-glutamate affects which can help elevate the mood and improve fatigue and cognitive symptoms. Psych is considering changing Ketamine IV to oral as a trial. Cannot make 2 changes to the regimen simultaneously. Patient in favor of trying Amantadine over changing to oral Ketamine. Next infusion is in a few days, will start Amantadine a few days after his Ketamine infusion.   Plan:  -Amantadine 100mg daily alternating days to be taken in the afternoon.  -Serum to be done once tapered completely off of Prednisone (will be in about a week). Will send for Cytokines, NFL and Neopterin (previously elevated).  -Follow up after serum resulted   Dr. Najjar will speak with psychiatry Dr. Jose Guillory [phone]

## 2024-04-24 NOTE — PHYSICAL EXAM
[Over the Past 2 Weeks, Have You Felt Down, Depressed, or Hopeless?] : 1.) Over the past 2 weeks, have you felt down, depressed, or hopeless? Yes [Over the Past 2 Weeks, Have You Felt Little Interest or Pleasure Doing Things?] : 2.) Over the past 2 weeks, have you felt little interest or pleasure doing things? Yes [FreeTextEntry1] : Follows with psychiatry

## 2024-04-25 ENCOUNTER — APPOINTMENT (OUTPATIENT)
Dept: UROLOGY | Facility: CLINIC | Age: 69
End: 2024-04-25
Payer: COMMERCIAL

## 2024-04-25 VITALS
TEMPERATURE: 98 F | HEART RATE: 70 BPM | SYSTOLIC BLOOD PRESSURE: 117 MMHG | DIASTOLIC BLOOD PRESSURE: 80 MMHG | OXYGEN SATURATION: 97 %

## 2024-04-25 DIAGNOSIS — N50.811 RIGHT TESTICULAR PAIN: ICD-10-CM

## 2024-04-25 PROCEDURE — 99204 OFFICE O/P NEW MOD 45 MIN: CPT

## 2024-04-25 PROCEDURE — 76870 US EXAM SCROTUM: CPT

## 2024-04-26 NOTE — ASSESSMENT
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 Cowan and we both agreed to extend doxycycline 10 additional days, NSAIDS for inflammation/discomfort, and close follow up.  If does not improve in next 2-3 days will need to consider altering abx, and consider non-infectious etiologies.  Patient infomred of left small lesion and need for follow up US scrotum.   Patient informed that he had more inguinal tenderness than average epididymitis, and although I did not appreciate a hernia on exam, he should be on the "lookout" for s/s of inguinal hernia.   Plan also for UA, cx, ureaplasma, mycoplasma, GC/chlamydia (although patient has been on abx which likely to impact results).   Derian Cruz MD, FACS, FRCS  of Urology [facility] Director of Laparoscopic and Robotic Surgery [facility] Director of Urology, [facility] Professor of Urology   (Office) [phone] (Cell)  [phone] [email or web address]  The total amount of time I have personally spent preparing for this visit, reviewing the patient's test results, obtaining external history, ordering tests/medications, documenting clinical information, communicating with and counseling the patient/family and/or caregiver(s), and spent face to face with the patient explaining the above was minutes =50

## 2024-04-26 NOTE — HISTORY OF PRESENT ILLNESS
[FreeTextEntry1] : CC: Scrotal pain/orchalgia  Rajan presents today as a new patient with complaints of scrotal pain.   This began 2 weeks ago, and was left sided.  No associated symptoms other than the left orchalgia.  He started Cipro empirically with some relief.  Two days ago, he experienced right sided orchalgia, started doxycycline 100 mg BID.   Today, pain is persistent and bothersome.  When asked about associated lower urinary symptoms, he states he may have more prominent intermittency of the stream, but not radically so.  No other changes to minor baseline LUTS.  No fever, chills, nausea, vomiting, anorexia.  No gross hematuria, flank pain, urethral discharge.   On exam, the right epididymis is tender with some tenderness of the inguinal region.  No palpable hernia.  Left testis/epididymis without tenderness.  No enlargement of the scrotum, no skin changes, erythema.  PVR minimal.    VALENCIA performed, unremarkable - not boggy, abnormally tender, and no suspicious nodules.  Symmetric.    Ultrasound performed, c/w right epididymitis (engorgement and hypervascular epididymal tail) and also noted a 5x6 mm hypoechoic nodule of the left testis, no blood flow to the lesion.    On taking PMH/PSH, Rajan informs me he has a history of giant cell arteritis and has been on a steroid taper for several weeks.

## 2024-05-03 LAB
APPEARANCE: CLEAR
BACTERIA UR CULT: NORMAL
BACTERIA: NEGATIVE /HPF
BILIRUBIN URINE: NEGATIVE
BLOOD URINE: NEGATIVE
C TRACH RRNA SPEC QL NAA+PROBE: NOT DETECTED
COLOR: NORMAL
GLUCOSE QUALITATIVE U: NEGATIVE MG/DL
KETONES URINE: ABNORMAL MG/DL
LEUKOCYTE ESTERASE URINE: ABNORMAL
MICROSCOPIC-UA: NORMAL
MYCOPLASMA HOMINIS CULTURE: NEGATIVE
N GONORRHOEA RRNA SPEC QL NAA+PROBE: NOT DETECTED
NITRITE URINE: NEGATIVE
PH URINE: 6.5
PROTEIN URINE: 30 MG/DL
RED BLOOD CELLS URINE: 2 /HPF
SOURCE AMPLIFICATION: NORMAL
SPECIFIC GRAVITY URINE: 1.03
SQUAMOUS EPITHELIAL CELLS: PRESENT
UREAPLASMA CULTURE: NEGATIVE
UROBILINOGEN URINE: 1 MG/DL
WHITE BLOOD CELLS URINE: 1 /HPF

## 2024-05-06 ENCOUNTER — LABORATORY RESULT (OUTPATIENT)
Age: 69
End: 2024-05-06

## 2024-05-09 ENCOUNTER — NON-APPOINTMENT (OUTPATIENT)
Age: 69
End: 2024-05-09

## 2024-05-09 LAB
A-TUMOR NECROSIS FACT SERPL-MCNC: <1.7 PG/ML
IGNF SERPL-MCNC: <4.2 PG/ML
IL10 SERPL-MCNC: 7.4 PG/ML
IL12 SERPL-MCNC: <1.9 PG/ML
IL13 SERPL-MCNC: <1.7 PG/ML
IL17A SERPL-MCNC: <1.4 PG/ML
IL2 SERPL-MCNC: 432.7 PG/ML
IL2 SERPL-MCNC: <2.1 PG/ML
IL4 SERPL-MCNC: <2.2 PG/ML
IL6 SERPL-MCNC: <2 PG/ML
IL8 SERPL-MCNC: <3 PG/ML
INTERLEUKIN 1 BETA: <6.5 PG/ML
INTERLEUKIN 5: <2.1 PG/ML

## 2024-05-10 LAB
MISCELLANEOUS TEST: NORMAL
PROC NAME: NORMAL

## 2024-05-14 LAB — NEOPTERIN CSF-SCNC: 2.4 NG/ML

## 2024-05-15 RX ORDER — TOCILIZUMAB 180 MG/ML
162 INJECTION, SOLUTION SUBCUTANEOUS
Qty: 3.6 | Refills: 2 | Status: ACTIVE | COMMUNITY
Start: 2020-05-01

## 2024-05-16 ENCOUNTER — RX RENEWAL (OUTPATIENT)
Age: 69
End: 2024-05-16

## 2024-05-16 RX ORDER — TERAZOSIN 2 MG/1
2 CAPSULE ORAL
Qty: 180 | Refills: 2 | Status: ACTIVE | COMMUNITY
Start: 2023-05-01 | End: 1900-01-01

## 2024-05-17 ENCOUNTER — APPOINTMENT (OUTPATIENT)
Dept: PAIN MANAGEMENT | Facility: CLINIC | Age: 69
End: 2024-05-17
Payer: COMMERCIAL

## 2024-05-17 DIAGNOSIS — G43.009 MIGRAINE W/OUT AURA, NOT INTRACTABLE, W/OUT STATUS MIGRAINOSUS: ICD-10-CM

## 2024-05-17 DIAGNOSIS — R51.9 HEADACHE, UNSPECIFIED: ICD-10-CM

## 2024-05-17 DIAGNOSIS — G43.701 CHRONIC MIGRAINE W/OUT AURA, NOT INTRACTABLE, WITH STATUS MIGRAINOSUS: ICD-10-CM

## 2024-05-17 PROCEDURE — 99213 OFFICE O/P EST LOW 20 MIN: CPT | Mod: 95

## 2024-05-17 NOTE — REASON FOR VISIT
[Home] : at home, [unfilled] , at the time of the visit. [Medical Office: ([facility])___] : at the medical office located in  [Follow-Up: _____] : a [unfilled] follow-up visit

## 2024-05-17 NOTE — PHYSICAL EXAM
[General Appearance - Alert] : alert [General Appearance - Well Nourished] : well nourished [Oriented To Time, Place, And Person] : oriented to person, place, and time [Impaired Insight] : insight and judgment were intact [Memory Recent] : recent memory was not impaired [Memory Remote] : remote memory was not impaired [FreeTextEntry1] : depressed affect but improved since last visit. [Person] : oriented to person [Place] : oriented to place [Time] : oriented to time [Short Term Intact] : short term memory intact [Remote Intact] : remote memory intact [Registration Intact] : recent registration memory intact [Visual Intact] : visual attention was ~T not ~L decreased [Naming Objects] : no difficulty naming common objects [Fluency] : fluency intact [Comprehension] : comprehension intact [Current Events] : adequate knowledge of current events [Past History] : adequate knowledge of personal past history [Vocabulary] : adequate range of vocabulary [Cranial Nerves Oculomotor (III)] : extraocular motion intact [Cranial Nerves Facial (VII)] : face symmetrical [Cranial Nerves Vestibulocochlear (VIII)] : hearing was intact bilaterally [Cranial Nerves Accessory (XI - Cranial And Spinal)] : head turning and shoulder shrug symmetric [Cranial Nerves Hypoglossal (XII)] : there was no tongue deviation with protrusion [No Muscle Atrophy] : normal bulk in all four extremities [Motor Strength Upper Extremities Bilaterally] : strength was normal in both upper extremities [Dysdiadochokinesia Bilaterally] : not present [Sclera] : the sclera and conjunctiva were normal [No ADAMS] : no internuclear ophthalmoplegia [Strabismus] : no strabismus was seen [Outer Ear] : the ears and nose were normal in appearance [Hearing Threshold Finger Rub Not [place]] : hearing was normal [Neck Appearance] : the appearance of the neck was normal [Neck Cervical Mass (___cm)] : no neck mass was observed [Exaggerated Use Of Accessory Muscles For Inspiration] : no accessory muscle use [Nail Clubbing] : no clubbing  or cyanosis of the fingernails [Involuntary Movements] : no involuntary movements were seen [Skin Color & Pigmentation] : normal skin color and pigmentation [] : no rash

## 2024-05-17 NOTE — ASSESSMENT
[FreeTextEntry1] : To Use melatonin at 3mg 2 hours prior to sleep. Continue Venlafaxine at 150mg at Li at 225mg. Discussed other non pharmacologic care.

## 2024-05-17 NOTE — REVIEW OF SYSTEMS
[Fever] : no fever [Feeling Poorly] : feeling poorly [Feeling Tired] : not feeling tired [Eye Pain] : eye pain [Eyesight Problems] : no eyesight problems [Nasal Discharge] : no nasal discharge [Chest Pain] : no chest pain [Cough] : no cough [Arthralgias] : arthralgias [Skin Lesions] : skin lesion [Convulsions] : no convulsions [Fainting] : no fainting [As Noted in HPI] : as noted in HPI [Sleep Disturbances] : sleep disturbances [Muscle Weakness] : no muscle weakness

## 2024-05-17 NOTE — HISTORY OF PRESENT ILLNESS
[FreeTextEntry1] : Pt notes that 2 weeks ago had felt sick and saw vasculitis specialist Dr. Carver in [place]. Had discussion of role of aortitis in his overall picture..  Had rash that had gone from knee to ankle and had biopsy which showed ? herpetic zoster. Recommended 6 months taper of steroid for vasculitis.  On 10th day of 40mg, then 30mg for 2 weeks and "so forth" Had recommendation of Actemra as well sq 1/ week. Is on 150mg/ day of venlafaxine and 225mg of Li. Felt worse with zoster but better now more recently. Took small dose melatonin yesterday and felt bright this AM.  Pleased with this. Improving with spear in eye, reduced feeling of doom. Asking re: steroid and thoughts and dosing of melatonin. Has continue Ketamine and ? use of amantadine. [Chronic Headache] : chronic headache [Nausea] : nausea [Photophobia] : photophobia [Scalp Tenderness] : scalp tenderness [> 15 days per month] : > 15 days per month [< 4 hours] : < 4 hours

## 2024-05-21 ENCOUNTER — APPOINTMENT (OUTPATIENT)
Dept: NUCLEAR MEDICINE | Facility: HOSPITAL | Age: 69
End: 2024-05-21

## 2024-05-21 ENCOUNTER — OUTPATIENT (OUTPATIENT)
Dept: OUTPATIENT SERVICES | Facility: HOSPITAL | Age: 69
LOS: 1 days | End: 2024-05-21
Payer: COMMERCIAL

## 2024-05-21 DIAGNOSIS — Z98.890 OTHER SPECIFIED POSTPROCEDURAL STATES: Chronic | ICD-10-CM

## 2024-05-21 DIAGNOSIS — J34.2 DEVIATED NASAL SEPTUM: Chronic | ICD-10-CM

## 2024-05-21 DIAGNOSIS — Z98.42 CATARACT EXTRACTION STATUS, LEFT EYE: Chronic | ICD-10-CM

## 2024-05-21 DIAGNOSIS — H26.9 UNSPECIFIED CATARACT: Chronic | ICD-10-CM

## 2024-05-21 DIAGNOSIS — G90.09 OTHER IDIOPATHIC PERIPHERAL AUTONOMIC NEUROPATHY: Chronic | ICD-10-CM

## 2024-05-21 DIAGNOSIS — Z90.49 ACQUIRED ABSENCE OF OTHER SPECIFIED PARTS OF DIGESTIVE TRACT: Chronic | ICD-10-CM

## 2024-05-21 PROCEDURE — A9552: CPT

## 2024-05-21 PROCEDURE — 78815 PET IMAGE W/CT SKULL-THIGH: CPT

## 2024-05-21 PROCEDURE — 82962 GLUCOSE BLOOD TEST: CPT

## 2024-05-21 PROCEDURE — 78815 PET IMAGE W/CT SKULL-THIGH: CPT | Mod: 26

## 2024-05-28 DIAGNOSIS — R73.9 HYPERGLYCEMIA, UNSPECIFIED: ICD-10-CM

## 2024-05-28 RX ORDER — CALCIUM CARB/VITAMIN D3/VIT K1 500-100-40
31G X 5/16" TABLET,CHEWABLE ORAL
Qty: 1 | Refills: 5 | Status: ACTIVE | COMMUNITY
Start: 2024-05-28 | End: 1900-01-01

## 2024-05-28 RX ORDER — LANCETS
EACH MISCELLANEOUS
Qty: 90 | Refills: 5 | Status: ACTIVE | COMMUNITY
Start: 2024-05-28 | End: 1900-01-01

## 2024-05-28 RX ORDER — HUMAN INSULIN 100 [IU]/ML
100 INJECTION, SUSPENSION SUBCUTANEOUS
Qty: 1 | Refills: 0 | Status: ACTIVE | COMMUNITY
Start: 2024-05-28

## 2024-05-29 PROBLEM — M54.81 OCCIPITAL NEURALGIA: Status: ACTIVE | Noted: 2024-05-29

## 2024-05-29 NOTE — ASSESSMENT
[FreeTextEntry1] :  Left Lesser and Greater Occipital Nerve Block  The potential benefits as well as rare but possible risks were reviewed with the patient.  These risks including infection including infection, abscess, bleeding including hematoma, nerve injury, failure to relieve pain or worse pain allergic reactions, adverse reactions to medications, vasovagal reactions, falls, etc. Following that discussion, all questions were again answered to the patient's satisfaction, the patient stated their verbal understanding and written consent was obtained.    After obtaining consent, pre-procedure blood pressure and heart rate were stable and recorded in the nursing record. Standard monitors were applied. The patient was placed prone on the fluoroscopy table. The groin/hip region was prepped with alcohol and draped in sterile fashion.   The occipital artery was palpated, and appropriate landmarks were identified. The skin overlying the target was anesthetized with 3 cc of 1% Lidocaine using 25-gauge 1.5 inch.  Aspiration was negative for heme, air, and CSF.   5 cc of  (0.25% Bupivacaine, 10 mg Dexamethasone) was injected intermittently and spread around the target nerves. Aspiration was negative for heme, air, and CSF.  The needle was removed, skin cleansed, gentle pressure was applied until minor bleeding at the scalp ceased.  The patient tolerated the procedure well and no complications were encountered immediately following the procedure. Following the procedure, the patient's vital signs were stable. The patient was discharged home in good condition with post-procedural instructions.  Time Out: Immediately prior to the procedure, the following was verbally confirmed that there is a consent form and that the correct patient, planned procedure, site and side are consistent with documentation and that necessary equipment available prior to the start of the case.  Complications: none EBL: <5 cc

## 2024-05-31 RX ORDER — LANCETS 33 GAUGE
EACH MISCELLANEOUS
Qty: 100 | Refills: 0 | Status: ACTIVE | COMMUNITY
Start: 2024-05-31 | End: 1900-01-01

## 2024-06-17 ENCOUNTER — APPOINTMENT (OUTPATIENT)
Dept: MRI IMAGING | Facility: CLINIC | Age: 69
End: 2024-06-17

## 2024-06-17 RX ORDER — RAMELTEON 8 MG/1
8 TABLET ORAL
Qty: 90 | Refills: 3 | Status: ACTIVE | COMMUNITY
Start: 2024-06-17 | End: 1900-01-01

## 2024-06-18 ENCOUNTER — OUTPATIENT (OUTPATIENT)
Dept: OUTPATIENT SERVICES | Facility: HOSPITAL | Age: 69
LOS: 1 days | End: 2024-06-18

## 2024-06-18 ENCOUNTER — APPOINTMENT (OUTPATIENT)
Dept: MRI IMAGING | Facility: CLINIC | Age: 69
End: 2024-06-18
Payer: COMMERCIAL

## 2024-06-18 ENCOUNTER — RESULT REVIEW (OUTPATIENT)
Age: 69
End: 2024-06-18

## 2024-06-18 DIAGNOSIS — Z98.890 OTHER SPECIFIED POSTPROCEDURAL STATES: Chronic | ICD-10-CM

## 2024-06-18 DIAGNOSIS — J34.2 DEVIATED NASAL SEPTUM: Chronic | ICD-10-CM

## 2024-06-18 DIAGNOSIS — H26.9 UNSPECIFIED CATARACT: Chronic | ICD-10-CM

## 2024-06-18 DIAGNOSIS — Z98.42 CATARACT EXTRACTION STATUS, LEFT EYE: Chronic | ICD-10-CM

## 2024-06-18 DIAGNOSIS — G90.09 OTHER IDIOPATHIC PERIPHERAL AUTONOMIC NEUROPATHY: Chronic | ICD-10-CM

## 2024-06-18 DIAGNOSIS — Z90.49 ACQUIRED ABSENCE OF OTHER SPECIFIED PARTS OF DIGESTIVE TRACT: Chronic | ICD-10-CM

## 2024-06-18 PROCEDURE — 72197 MRI PELVIS W/O & W/DYE: CPT | Mod: 26

## 2024-06-18 PROCEDURE — 76498P: CUSTOM | Mod: 26

## 2024-06-25 LAB
ALBUMIN SERPL ELPH-MCNC: 4.1 G/DL
ALP BLD-CCNC: 46 U/L
ALT SERPL-CCNC: 17 U/L
ANION GAP SERPL CALC-SCNC: 10 MMOL/L
AST SERPL-CCNC: 17 U/L
BASOPHILS # BLD AUTO: 0.02 K/UL
BASOPHILS NFR BLD AUTO: 0.3 %
BILIRUB SERPL-MCNC: 0.4 MG/DL
BUN SERPL-MCNC: 27 MG/DL
CALCIUM SERPL-MCNC: 8.9 MG/DL
CHLORIDE SERPL-SCNC: 103 MMOL/L
CO2 SERPL-SCNC: 26 MMOL/L
CREAT SERPL-MCNC: 1.57 MG/DL
CRP SERPL-MCNC: <3 MG/L
EGFR: 47 ML/MIN/1.73M2
EOSINOPHIL # BLD AUTO: 0.08 K/UL
EOSINOPHIL NFR BLD AUTO: 1.2 %
ERYTHROCYTE [SEDIMENTATION RATE] IN BLOOD BY WESTERGREN METHOD: 2 MM/HR
GLUCOSE SERPL-MCNC: 98 MG/DL
HCT VFR BLD CALC: 46.3 %
HGB BLD-MCNC: 14 G/DL
IMM GRANULOCYTES NFR BLD AUTO: 1.1 %
LITHIUM SERPL-SCNC: 0.46 MMOL/L
LYMPHOCYTES # BLD AUTO: 1.63 K/UL
LYMPHOCYTES NFR BLD AUTO: 24.8 %
MAN DIFF?: NORMAL
MCHC RBC-ENTMCNC: 29 PG
MCHC RBC-ENTMCNC: 30.2 GM/DL
MCV RBC AUTO: 96.1 FL
MONOCYTES # BLD AUTO: 0.38 K/UL
MONOCYTES NFR BLD AUTO: 5.8 %
NEUTROPHILS # BLD AUTO: 4.4 K/UL
NEUTROPHILS NFR BLD AUTO: 66.8 %
PLATELET # BLD AUTO: 167 K/UL
POTASSIUM SERPL-SCNC: 5.2 MMOL/L
PROT SERPL-MCNC: 5.7 G/DL
RBC # BLD: 4.82 M/UL
RBC # FLD: 17.6 %
SODIUM SERPL-SCNC: 138 MMOL/L
WBC # FLD AUTO: 6.58 K/UL

## 2024-07-25 RX ORDER — PROPRANOLOL HYDROCHLORIDE 40 MG/1
40 TABLET ORAL 3 TIMES DAILY
Qty: 90 | Refills: 2 | Status: ACTIVE | COMMUNITY
Start: 2024-07-25 | End: 1900-01-01

## 2024-07-25 RX ORDER — SUMATRIPTAN 100 MG/1
100 TABLET, FILM COATED ORAL
Qty: 30 | Refills: 6 | Status: ACTIVE | COMMUNITY
Start: 2024-07-25 | End: 1900-01-01

## 2024-07-26 DIAGNOSIS — U07.1 COVID-19: ICD-10-CM

## 2024-07-26 RX ORDER — NIRMATRELVIR AND RITONAVIR 300-100 MG
20 X 150 MG & KIT ORAL
Qty: 1 | Refills: 0 | Status: ACTIVE | COMMUNITY
Start: 2024-07-26 | End: 1900-01-01

## 2024-07-26 RX ORDER — ATOGEPANT 60 MG/1
60 TABLET ORAL
Qty: 90 | Refills: 3 | Status: ACTIVE | COMMUNITY
Start: 2024-07-25

## 2024-08-22 ENCOUNTER — RX RENEWAL (OUTPATIENT)
Age: 69
End: 2024-08-22

## 2024-08-27 ENCOUNTER — APPOINTMENT (OUTPATIENT)
Dept: NEUROLOGY | Facility: CLINIC | Age: 69
End: 2024-08-27
Payer: COMMERCIAL

## 2024-08-27 ENCOUNTER — LABORATORY RESULT (OUTPATIENT)
Age: 69
End: 2024-08-27

## 2024-08-27 ENCOUNTER — APPOINTMENT (OUTPATIENT)
Dept: PAIN MANAGEMENT | Facility: CLINIC | Age: 69
End: 2024-08-27

## 2024-08-27 VITALS
OXYGEN SATURATION: 98 % | HEIGHT: 68 IN | DIASTOLIC BLOOD PRESSURE: 83 MMHG | BODY MASS INDEX: 21.67 KG/M2 | TEMPERATURE: 97.3 F | SYSTOLIC BLOOD PRESSURE: 143 MMHG | WEIGHT: 143 LBS | HEART RATE: 63 BPM

## 2024-08-27 DIAGNOSIS — R45.89 OTHER SYMPTOMS AND SIGNS INVOLVING EMOTIONAL STATE: ICD-10-CM

## 2024-08-27 DIAGNOSIS — G44.009 CLUSTER HEADACHE SYNDROME, UNSPECIFIED, NOT INTRACTABLE: ICD-10-CM

## 2024-08-27 PROCEDURE — G2211 COMPLEX E/M VISIT ADD ON: CPT

## 2024-08-27 PROCEDURE — 99214 OFFICE O/P EST MOD 30 MIN: CPT | Mod: 95

## 2024-08-27 PROCEDURE — 99214 OFFICE O/P EST MOD 30 MIN: CPT

## 2024-08-27 RX ORDER — COLD-HOT PACK
600 EACH MISCELLANEOUS
Qty: 60 | Refills: 2 | Status: ACTIVE | COMMUNITY
Start: 2024-08-27 | End: 1900-01-01

## 2024-08-27 NOTE — PHYSICAL EXAM
[FreeTextEntry1] : General inspection: Well nourished, well developed in no acute distress with stable vital signs.  Neurological examination: MS: awake, alert with fluent speech.  Cranial nerve exam: PERRLA, EOMI, no nystagmus, visual fields are full, Fundoscopic examination is normal with clear optic discs, no APD, facial movements and sensations are normal, tongue movements are normal, and uvula and soft palate rise symmetrically at midline. +gag  Motor exam: 5/5 throughout with normal tone and bulk. Fine motor skills are intact bilaterally  Coordination: No dysmetria on F-N or H-S. DAVID are normal. Mild postural tremor bilaterally   Muscle stretch reflexes: 2/5 throughout. Plantar responses are flexor bilaterally.  Sensation: intact to all modalities to include PP, ST, vibration, and proprioception  Gait: normal with normal foot tapping. mild difficulty with tandem walk. Romberg's sign is absent.

## 2024-08-27 NOTE — HISTORY OF PRESENT ILLNESS
[FreeTextEntry1] : Mr. CHRISTENSEN presents today for a follow up visit of symptoms of mood changes, isaura, hypomania, brain fog, cluster headaches, nausea, fatigue, exhaustion with sleep disturbances.   Cluster headaches, on Qulipta 60mg without notable changes and reported increase in constipation. Symptoms are described as left sided throbbing pain with associated left sided nasal drip with left eye blurred vision.   Continues to follow with psychiatry for depressive/hypomania Remains on Lithium [phone]. On Amantadine 50mg BID.   Discussed simplifying medication regimen to avoid polypharmacy and its potential interactions, side effects and complications. Patient began reducing medication doses due to several side effects such as anti-cholingeric affects.   Seen by Dr. Lit Alonso this morning. Recommending simplifying medication regimen as well as potentially resuming monthly Emgality as well as Zavzpret nasal spray (CGRP antagonist) as a rescue.   Current medication:  -Effexor XR 25mg -Amitriptyline 15mg -Lithium [phone] -Amantadine 50mg BID  -Tumeric 1000mg daily   He denies any new neurological complaints at this time.

## 2024-08-27 NOTE — DISCUSSION/SUMMARY
[FreeTextEntry1] : Recommend limiting polypharmacy to avoid serious interactions and side effects as well as its potential to limit efficacy of treatment.   Agree with Dr. Valera recommendations which were shared this morning to include tapering off Amitriptyline and Amantadine. Further agree with tapering off Effexor XR secondary to side effects and poor toleration. Will ask Dr. Alonso to be in charge of pharmacological changes and tapering off Amitriptyline and Effexor XR.  Dr. Alonso further suggested using a new SNRI which he will start once he is titrated off Effexor.   Stop Qulipta given he does not feel is effective and is causing constipation, though he is on other medications that can further add to this. Agree with Dr Alonso's recommendations in resuming Emgality and Zavzpret nasal spray as a rescue.   Advised to continue with local psychiatrist who is currently prescribing Lithium which has been effective in managing depressive hypomania component. Will start NAC as it has been shown to be an efficacious adjunctive treatment to psychotropic medication. Is also has been shown to reduce free raticles reducing inflammation and is commonly used in refractory depression with a neuro-inflammatory component.   Plan:  -Continue tapering off Effexor to be replaced with an SNRI  -Reduce Amitriptyline to 10mg x 4 days then stop (currently on 15mg)  -Continue Amantadine 50mg BID for its anti-glutamitergic effects.  -Start NAC 600mg, 1 tab daily after meals for 1 week then increase to 1 tab BID -Stop Qulipta   All questions and concerns were addressed to the best of my ability. Emotional support was rendered.

## 2024-08-28 ENCOUNTER — OUTPATIENT (OUTPATIENT)
Dept: OUTPATIENT SERVICES | Facility: HOSPITAL | Age: 69
LOS: 1 days | End: 2024-08-28
Payer: COMMERCIAL

## 2024-08-28 ENCOUNTER — APPOINTMENT (OUTPATIENT)
Dept: NUCLEAR MEDICINE | Facility: HOSPITAL | Age: 69
End: 2024-08-28

## 2024-08-28 DIAGNOSIS — H26.9 UNSPECIFIED CATARACT: Chronic | ICD-10-CM

## 2024-08-28 DIAGNOSIS — J34.2 DEVIATED NASAL SEPTUM: Chronic | ICD-10-CM

## 2024-08-28 DIAGNOSIS — G90.09 OTHER IDIOPATHIC PERIPHERAL AUTONOMIC NEUROPATHY: Chronic | ICD-10-CM

## 2024-08-28 DIAGNOSIS — Z98.890 OTHER SPECIFIED POSTPROCEDURAL STATES: Chronic | ICD-10-CM

## 2024-08-28 DIAGNOSIS — Z90.49 ACQUIRED ABSENCE OF OTHER SPECIFIED PARTS OF DIGESTIVE TRACT: Chronic | ICD-10-CM

## 2024-08-28 DIAGNOSIS — Z98.42 CATARACT EXTRACTION STATUS, LEFT EYE: Chronic | ICD-10-CM

## 2024-08-28 LAB
ALBUMIN SERPL ELPH-MCNC: 4.7 G/DL
ALP BLD-CCNC: 58 U/L
ALT SERPL-CCNC: 32 U/L
ANION GAP SERPL CALC-SCNC: 10 MMOL/L
AST SERPL-CCNC: 25 U/L
BASOPHILS # BLD AUTO: 0.02 K/UL
BASOPHILS NFR BLD AUTO: 0.2 %
BILIRUB SERPL-MCNC: 0.5 MG/DL
BUN SERPL-MCNC: 22 MG/DL
CALCIUM SERPL-MCNC: 9.8 MG/DL
CHLORIDE SERPL-SCNC: 102 MMOL/L
CHOLEST SERPL-MCNC: 175 MG/DL
CO2 SERPL-SCNC: 26 MMOL/L
CREAT SERPL-MCNC: 1.25 MG/DL
CRP SERPL-MCNC: <3 MG/L
EGFR: 62 ML/MIN/1.73M2
EOSINOPHIL # BLD AUTO: 0 K/UL
EOSINOPHIL NFR BLD AUTO: 0 %
ERYTHROCYTE [SEDIMENTATION RATE] IN BLOOD BY WESTERGREN METHOD: 3 MM/HR
GLUCOSE SERPL-MCNC: 101 MG/DL
HCT VFR BLD CALC: 47.1 %
HDLC SERPL-MCNC: 69 MG/DL
HGB BLD-MCNC: 15 G/DL
IMM GRANULOCYTES NFR BLD AUTO: 0.5 %
LDLC SERPL CALC-MCNC: 86 MG/DL
LITHIUM SERPL-SCNC: 0.57 MMOL/L
LYMPHOCYTES # BLD AUTO: 2.05 K/UL
LYMPHOCYTES NFR BLD AUTO: 19.7 %
MAN DIFF?: NORMAL
MCHC RBC-ENTMCNC: 29.6 PG
MCHC RBC-ENTMCNC: 31.8 GM/DL
MCV RBC AUTO: 93.1 FL
MONOCYTES # BLD AUTO: 0.64 K/UL
MONOCYTES NFR BLD AUTO: 6.1 %
NEUTROPHILS # BLD AUTO: 7.65 K/UL
NEUTROPHILS NFR BLD AUTO: 73.5 %
NONHDLC SERPL-MCNC: 106 MG/DL
PLATELET # BLD AUTO: 160 K/UL
POTASSIUM SERPL-SCNC: 4.9 MMOL/L
PROT SERPL-MCNC: 6.4 G/DL
RBC # BLD: 5.06 M/UL
RBC # FLD: 13.4 %
SODIUM SERPL-SCNC: 138 MMOL/L
TESTOST FREE SERPL-MCNC: 2 PG/ML
TESTOST SERPL-MCNC: 740 NG/DL
TRIGL SERPL-MCNC: 109 MG/DL
TSH SERPL-ACNC: 1.19 UIU/ML
WBC # FLD AUTO: 10.41 K/UL

## 2024-08-28 PROCEDURE — 82962 GLUCOSE BLOOD TEST: CPT

## 2024-08-29 LAB — NFL CHAIN SERPL IA-MCNC: 27.9 PG/ML

## 2024-08-29 NOTE — END OF VISIT
Head, normocephalic, atraumatic, Face, Face within normal limits [Time Spent: ___ minutes] : I have spent [unfilled] minutes of time on the encounter.

## 2024-08-30 LAB
A-TUMOR NECROSIS FACT SERPL-MCNC: <1.7 PG/ML
IGNF SERPL-MCNC: <4.2 PG/ML
IL10 SERPL-MCNC: 3.7 PG/ML
IL12 SERPL-MCNC: <1.9 PG/ML
IL13 SERPL-MCNC: <1.7 PG/ML
IL17A SERPL-MCNC: <1.4 PG/ML
IL2 SERPL-MCNC: 141 PG/ML
IL2 SERPL-MCNC: <2.1 PG/ML
IL4 SERPL-MCNC: <2.2 PG/ML
IL6 SERPL-MCNC: <2 PG/ML
IL8 SERPL-MCNC: <3 PG/ML
INTERLEUKIN 1 BETA: <6.5 PG/ML
INTERLEUKIN 5: <2.1 PG/ML

## 2024-09-03 LAB — NEOPTERIN CSF-SCNC: 2.6 NG/ML

## 2024-09-12 RX ORDER — GALCANEZUMAB 120 MG/ML
120 INJECTION, SOLUTION SUBCUTANEOUS
Qty: 2 | Refills: 2 | Status: ACTIVE | COMMUNITY
Start: 2024-09-10

## 2024-09-13 ENCOUNTER — APPOINTMENT (OUTPATIENT)
Dept: PAIN MANAGEMENT | Facility: CLINIC | Age: 69
End: 2024-09-13

## 2024-09-13 DIAGNOSIS — R51.9 HEADACHE, UNSPECIFIED: ICD-10-CM

## 2024-09-13 DIAGNOSIS — G43.711 CHRONIC MIGRAINE W/OUT AURA, INTRACTABLE, WITH STATUS MIGRAINOSUS: ICD-10-CM

## 2024-09-13 DIAGNOSIS — G43.009 MIGRAINE W/OUT AURA, NOT INTRACTABLE, W/OUT STATUS MIGRAINOSUS: ICD-10-CM

## 2024-09-13 DIAGNOSIS — R41.89 OTHER SYMPTOMS AND SIGNS INVOLVING COGNITIVE FUNCTIONS AND AWARENESS: ICD-10-CM

## 2024-09-13 DIAGNOSIS — R45.89 OTHER SYMPTOMS AND SIGNS INVOLVING EMOTIONAL STATE: ICD-10-CM

## 2024-09-13 DIAGNOSIS — G44.51 HEMICRANIA CONTINUA: ICD-10-CM

## 2024-09-13 DIAGNOSIS — R53.82 CHRONIC FATIGUE, UNSPECIFIED: ICD-10-CM

## 2024-09-13 DIAGNOSIS — M79.10 MYALGIA, UNSPECIFIED SITE: ICD-10-CM

## 2024-09-13 PROCEDURE — 99214 OFFICE O/P EST MOD 30 MIN: CPT | Mod: 95

## 2024-09-13 RX ORDER — GALCANEZUMAB 120 MG/ML
120 INJECTION, SOLUTION SUBCUTANEOUS
Qty: 1 | Refills: 5 | Status: ACTIVE | COMMUNITY
Start: 2024-09-11

## 2024-09-13 RX ORDER — GALCANEZUMAB 120 MG/ML
120 INJECTION, SOLUTION SUBCUTANEOUS
Qty: 2 | Refills: 0 | Status: ACTIVE | COMMUNITY
Start: 2024-09-12 | End: 1900-01-01

## 2024-09-18 ENCOUNTER — APPOINTMENT (OUTPATIENT)
Dept: PAIN MANAGEMENT | Facility: CLINIC | Age: 69
End: 2024-09-18

## 2024-09-18 DIAGNOSIS — U07.1 COVID-19: ICD-10-CM

## 2024-09-18 NOTE — REVIEW OF SYSTEMS
[Fever] : no fever [Feeling Poorly] : feeling poorly [Feeling Tired] : feeling tired [Eye Pain] : eye pain [Eyesight Problems] : no eyesight problems [Dry Eyes] : no dryness of the eyes [Nasal Discharge] : no nasal discharge [Chest Pain] : no chest pain [Cough] : no cough [Constipation] : no constipation [Hesitancy] : no urinary hesitancy [Arthralgias] : arthralgias [Neck Pain] : neck pain [Skin Lesions] : no skin lesions [Itching] : no itching [Convulsions] : no convulsions [Dizziness] : no dizziness [Fainting] : no fainting [Suicidal] : not suicidal [Sleep Disturbances] : sleep disturbances [Anxiety] : anxiety [Depression] : depression [Muscle Weakness] : no muscle weakness [de-identified] : notes low mood but denies SI or plan

## 2024-09-18 NOTE — REVIEW OF SYSTEMS
[Fever] : no fever [Feeling Poorly] : feeling poorly [Feeling Tired] : feeling tired [Eye Pain] : eye pain [Eyesight Problems] : no eyesight problems [Dry Eyes] : no dryness of the eyes [Nasal Discharge] : no nasal discharge [Chest Pain] : no chest pain [Cough] : no cough [Constipation] : no constipation [Hesitancy] : no urinary hesitancy [Arthralgias] : arthralgias [Neck Pain] : neck pain [Skin Lesions] : no skin lesions [Itching] : no itching [Convulsions] : no convulsions [Dizziness] : no dizziness [Fainting] : no fainting [Suicidal] : not suicidal [Sleep Disturbances] : sleep disturbances [Anxiety] : anxiety [Depression] : depression [Muscle Weakness] : no muscle weakness [de-identified] : notes low mood but denies SI or plan

## 2024-09-18 NOTE — ASSESSMENT
[FreeTextEntry1] : Noted patient likely experiencing withdrawal/ discontinuation of multiple agents. He should follow up with primary psychiatry around potential use of alternative SNRI- Fetzima or Pristiq. Would not change lithium dosing further and consider re increase to 150mg tid. Would consider [facility] group for behavioral intervention. Should consider visit in person for urine tox screen, assessment of any extrapyramidal signs. Would consider reconsideration of low dose neuroleptic- olanzapine vs risperidone. COuld consider use of VNS for mood and pain. TO restart Emgality and use monthly

## 2024-09-18 NOTE — PHYSICAL EXAM
[General Appearance - Alert] : alert [General Appearance - Well Nourished] : well nourished [Oriented To Time, Place, And Person] : oriented to person, place, and time [Impaired Insight] : insight and judgment were intact [Memory Recent] : recent memory was not impaired [Memory Remote] : remote memory was not impaired [Person] : oriented to person [Place] : oriented to place [Time] : oriented to time [Short Term Intact] : short term memory intact [Remote Intact] : remote memory intact [Registration Intact] : recent registration memory intact [Visual Intact] : visual attention was ~T not ~L decreased [Naming Objects] : no difficulty naming common objects [Fluency] : fluency intact [Comprehension] : comprehension intact [Current Events] : adequate knowledge of current events [Past History] : adequate knowledge of personal past history [Vocabulary] : adequate range of vocabulary [Cranial Nerves Oculomotor (III)] : extraocular motion intact [Cranial Nerves Facial (VII)] : face symmetrical [Cranial Nerves Vestibulocochlear (VIII)] : hearing was intact bilaterally [Cranial Nerves Accessory (XI - Cranial And Spinal)] : head turning and shoulder shrug symmetric [Cranial Nerves Hypoglossal (XII)] : there was no tongue deviation with protrusion [No Muscle Atrophy] : normal bulk in all four extremities [Sclera] : the sclera and conjunctiva were normal [No ADAMS] : no internuclear ophthalmoplegia [Strabismus] : no strabismus was seen [Outer Ear] : the ears and nose were normal in appearance [Hearing Threshold Finger Rub Not [place]] : hearing was normal [Neck Appearance] : the appearance of the neck was normal [Neck Cervical Mass (___cm)] : no neck mass was observed [Exaggerated Use Of Accessory Muscles For Inspiration] : no accessory muscle use [Nail Clubbing] : no clubbing  or cyanosis of the fingernails [Involuntary Movements] : no involuntary movements were seen [Skin Color & Pigmentation] : normal skin color and pigmentation [] : no rash [FreeTextEntry1] : depressed affect but improved since last visit. [Motor Strength Upper Extremities Bilaterally] : strength was normal in both upper extremities [Dysdiadochokinesia Bilaterally] : not present [FreeTextEntry4] : patient somewhat circumstantial in discussion

## 2024-09-18 NOTE — PHYSICAL EXAM
[General Appearance - Alert] : alert [General Appearance - Well Nourished] : well nourished [Oriented To Time, Place, And Person] : oriented to person, place, and time [Impaired Insight] : insight and judgment were intact [Memory Recent] : recent memory was not impaired [Memory Remote] : remote memory was not impaired [Person] : oriented to person [Place] : oriented to place [Time] : oriented to time [Short Term Intact] : short term memory intact [Remote Intact] : remote memory intact [Registration Intact] : recent registration memory intact [Visual Intact] : visual attention was ~T not ~L decreased [Naming Objects] : no difficulty naming common objects [Fluency] : fluency intact [Comprehension] : comprehension intact [Current Events] : adequate knowledge of current events [Past History] : adequate knowledge of personal past history [Vocabulary] : adequate range of vocabulary [Cranial Nerves Oculomotor (III)] : extraocular motion intact [Cranial Nerves Facial (VII)] : face symmetrical [Cranial Nerves Vestibulocochlear (VIII)] : hearing was intact bilaterally [Cranial Nerves Accessory (XI - Cranial And Spinal)] : head turning and shoulder shrug symmetric [Cranial Nerves Hypoglossal (XII)] : there was no tongue deviation with protrusion [No Muscle Atrophy] : normal bulk in all four extremities [Sclera] : the sclera and conjunctiva were normal [No ADAMS] : no internuclear ophthalmoplegia [Strabismus] : no strabismus was seen [Outer Ear] : the ears and nose were normal in appearance [Hearing Threshold Finger Rub Not [place]] : hearing was normal [Neck Appearance] : the appearance of the neck was normal [Neck Cervical Mass (___cm)] : no neck mass was observed [Exaggerated Use Of Accessory Muscles For Inspiration] : no accessory muscle use [Nail Clubbing] : no clubbing  or cyanosis of the fingernails [Involuntary Movements] : no involuntary movements were seen [Skin Color & Pigmentation] : normal skin color and pigmentation [] : no rash [FreeTextEntry1] : depressed affect but improved since last visit. [Motor Strength Upper Extremities Bilaterally] : strength was normal in both upper extremities [Dysdiadochokinesia Bilaterally] : not present [FreeTextEntry4] : patient somewhat circumstantial in discussion

## 2024-09-18 NOTE — ASSESSMENT
[FreeTextEntry1] : Noted patient likely experiencing withdrawal/ discontinuation of multiple agents. He should follow up with primary psychiatry around potential use of alternative SNRI- Fetzima or Pristiq. Would not change lithium dosing further and consider re increase to 150mg tid. Would consider [facility] group for behavioral intervention. Should consider visit in person for urine tox screen, assessment of any extrapyramidal signs. Would consider reconsideration of low dose neuroleptic- olanzapine vs risperidone. COuld consider use of VNS for mood and pain. TO restart Emgality and use monthly

## 2024-09-18 NOTE — HISTORY OF PRESENT ILLNESS
[Home] : at home, [unfilled] , at the time of the visit. [Medical Office: ([facility])___] : at the medical office located in  [Verbal consent obtained from patient] : the patient, [unfilled] [FreeTextEntry1] : Pt notes that since last visit 2 weeks ago he has made multiple changes in his medications due to frustration.  Has acutely stopped the venlafaxine, amitriptyline, and QUlipta.  Had also decreased lithium to 150mg bid.   Notes that he has felt acutely worse with these changes and that his mood remains low.   Has not followed up with his primary psychiatrist and we discussed need to return to care. Did reiterate potential benefit with [facility] group and role of biofeedback or cognitive behavioral therapy. Has improved constipation and ? related to change in medications. NOtes that he feels acutely worse and I noted potential withdrawal effects from having discontinued medications. Unclear what other medications he has taken, although he noted that he had stopped psychotomimetics, opiates or naltrexone.  [Chronic Headache] : chronic headache [Neck Pain] : neck pain [Scalp Tenderness] : scalp tenderness [> 15 days per month] : > 15 days per month [FreeTextEntry9] : unchanged

## 2024-09-18 NOTE — HISTORY OF PRESENT ILLNESS
[Home] : at home, [unfilled] , at the time of the visit. [Medical Office: ([facility])___] : at the medical office located in  [Verbal consent obtained from patient] : the patient, [unfilled] [FreeTextEntry1] : Pt notes that since last visit 2 weeks ago he has made multiple changes in his medications due to frustration.  Has acutely stopped the venlafaxine, amitriptyline, and QUlipta.  Had also decreased lithium to 150mg bid.   Notes that he has felt acutely worse with these changes and that his mood remains low.   Has not followed up with his primary psychiatrist and we discussed need to return to care. Did reiterate potential benefit with [facility] group and role of biofeedback or cognitive behavioral therapy. Has improved constipation and ? related to change in medications. NOtes that he feels acutely worse and I noted potential withdrawal effects from having discontinued medications. Unclear what other medications he has taken, although he noted that he had stopped psychotomimetics, opiates or naltrexone.  [Chronic Headache] : chronic headache [Neck Pain] : neck pain [Scalp Tenderness] : scalp tenderness [> 15 days per month] : > 15 days per month [FreeTextEntry9] : unchanged

## 2024-09-19 LAB
ALBUMIN SERPL ELPH-MCNC: 4.5 G/DL
ALP BLD-CCNC: 52 U/L
ALT SERPL-CCNC: 26 U/L
ANION GAP SERPL CALC-SCNC: 12 MMOL/L
AST SERPL-CCNC: 22 U/L
BASOPHILS # BLD AUTO: 0 K/UL
BASOPHILS NFR BLD AUTO: 0 %
BILIRUB SERPL-MCNC: 0.7 MG/DL
BUN SERPL-MCNC: 25 MG/DL
CALCIUM SERPL-MCNC: 9.9 MG/DL
CHLORIDE SERPL-SCNC: 106 MMOL/L
CHOLEST SERPL-MCNC: 180 MG/DL
CO2 SERPL-SCNC: 24 MMOL/L
CREAT SERPL-MCNC: 1.4 MG/DL
EGFR: 54 ML/MIN/1.73M2
EOSINOPHIL # BLD AUTO: 0 K/UL
EOSINOPHIL NFR BLD AUTO: 0 %
ERYTHROCYTE [SEDIMENTATION RATE] IN BLOOD BY WESTERGREN METHOD: 5 MM/HR
ESTIMATED AVERAGE GLUCOSE: 114 MG/DL
GLUCOSE SERPL-MCNC: 127 MG/DL
HBA1C MFR BLD HPLC: 5.6 %
HCT VFR BLD CALC: 47.7 %
HDLC SERPL-MCNC: 91 MG/DL
HGB BLD-MCNC: 14.6 G/DL
IMM GRANULOCYTES NFR BLD AUTO: 0.5 %
LDLC SERPL CALC-MCNC: 75 MG/DL
LITHIUM SERPL-SCNC: 0.37 MMOL/L
LYMPHOCYTES # BLD AUTO: 0.73 K/UL
LYMPHOCYTES NFR BLD AUTO: 9.2 %
MAN DIFF?: NORMAL
MCHC RBC-ENTMCNC: 29.4 PG
MCHC RBC-ENTMCNC: 30.6 GM/DL
MCV RBC AUTO: 96.2 FL
MONOCYTES # BLD AUTO: 0.12 K/UL
MONOCYTES NFR BLD AUTO: 1.5 %
NEUTROPHILS # BLD AUTO: 7.04 K/UL
NEUTROPHILS NFR BLD AUTO: 88.8 %
NONHDLC SERPL-MCNC: 89 MG/DL
PLATELET # BLD AUTO: 166 K/UL
POTASSIUM SERPL-SCNC: 4.9 MMOL/L
PROT SERPL-MCNC: 6.3 G/DL
PSA SERPL-MCNC: 1.63 NG/ML
RBC # BLD: 4.96 M/UL
RBC # FLD: 14 %
SODIUM SERPL-SCNC: 141 MMOL/L
TRIGL SERPL-MCNC: 79 MG/DL
TSH SERPL-ACNC: 1.05 UIU/ML
WBC # FLD AUTO: 7.93 K/UL

## 2024-09-20 LAB
CRP SERPL-MCNC: <3 MG/L
IL6 SERPL-MCNC: <2.5 PG/ML
RAPID RVP RESULT: NOT DETECTED
SARS-COV-2 RNA PNL RESP NAA+PROBE: NOT DETECTED

## 2024-09-25 LAB
TESTOST FREE SERPL-MCNC: 1.7 PG/ML
TESTOST SERPL-MCNC: 327 NG/DL

## 2024-09-27 LAB
HCYS SERPL-MCNC: 11.9 UMOL/L
VIT B12 SERPL-MCNC: 508 PG/ML

## 2024-09-30 LAB — METHYLMALONATE SERPL-SCNC: 493 NMOL/L

## 2024-10-07 ENCOUNTER — NON-APPOINTMENT (OUTPATIENT)
Age: 69
End: 2024-10-07

## 2024-10-10 ENCOUNTER — APPOINTMENT (OUTPATIENT)
Dept: PAIN MANAGEMENT | Facility: CLINIC | Age: 69
End: 2024-10-10

## 2024-10-10 PROCEDURE — 99213 OFFICE O/P EST LOW 20 MIN: CPT | Mod: 95

## 2024-10-10 RX ORDER — LEVOMILNACIPRAN HYDROCHLORIDE 20 MG-40MG
20 & 40 KIT ORAL
Qty: 1 | Refills: 0 | Status: ACTIVE | COMMUNITY
Start: 2024-10-10 | End: 1900-01-01

## 2024-10-15 RX ORDER — DIHYDROERGOTAMINE MESYLATE NASAL 4 MG/ML
4 SPRAY, METERED NASAL
Qty: 2 | Refills: 0 | Status: ACTIVE | COMMUNITY
Start: 2024-10-15 | End: 1900-01-01

## 2024-10-16 ENCOUNTER — RX RENEWAL (OUTPATIENT)
Age: 69
End: 2024-10-16

## 2024-10-16 ENCOUNTER — APPOINTMENT (OUTPATIENT)
Dept: PAIN MANAGEMENT | Facility: CLINIC | Age: 69
End: 2024-10-16

## 2024-10-16 PROCEDURE — 99442: CPT

## 2024-10-16 RX ORDER — RISPERIDONE 0.5 MG/1
0.5 TABLET, FILM COATED ORAL
Qty: 30 | Refills: 2 | Status: ACTIVE | COMMUNITY
Start: 2024-10-16 | End: 1900-01-01

## 2024-10-18 ENCOUNTER — NON-APPOINTMENT (OUTPATIENT)
Age: 69
End: 2024-10-18

## 2024-10-22 ENCOUNTER — NON-APPOINTMENT (OUTPATIENT)
Age: 69
End: 2024-10-22

## 2024-10-22 ENCOUNTER — APPOINTMENT (OUTPATIENT)
Dept: OPHTHALMOLOGY | Facility: CLINIC | Age: 69
End: 2024-10-22
Payer: COMMERCIAL

## 2024-10-22 PROCEDURE — 92133 CPTRZD OPH DX IMG PST SGM ON: CPT

## 2024-10-22 PROCEDURE — 92014 COMPRE OPH EXAM EST PT 1/>: CPT

## 2024-10-22 PROCEDURE — 92083 EXTENDED VISUAL FIELD XM: CPT

## 2024-10-25 RX ORDER — ARIPIPRAZOLE 2 MG/1
2 TABLET ORAL
Qty: 30 | Refills: 3 | Status: ACTIVE | COMMUNITY
Start: 2024-10-25 | End: 1900-01-01

## 2024-10-28 ENCOUNTER — NON-APPOINTMENT (OUTPATIENT)
Age: 69
End: 2024-10-28

## 2024-10-28 ENCOUNTER — APPOINTMENT (OUTPATIENT)
Dept: OPHTHALMOLOGY | Facility: CLINIC | Age: 69
End: 2024-10-28
Payer: COMMERCIAL

## 2024-10-28 PROCEDURE — 92060 SENSORIMOTOR EXAMINATION: CPT

## 2024-10-28 PROCEDURE — 92014 COMPRE OPH EXAM EST PT 1/>: CPT

## 2024-10-28 PROCEDURE — 92083 EXTENDED VISUAL FIELD XM: CPT

## 2024-10-28 PROCEDURE — 92250 FUNDUS PHOTOGRAPHY W/I&R: CPT

## 2024-11-07 ENCOUNTER — APPOINTMENT (OUTPATIENT)
Dept: PAIN MANAGEMENT | Facility: CLINIC | Age: 69
End: 2024-11-07

## 2024-11-07 PROCEDURE — 99213 OFFICE O/P EST LOW 20 MIN: CPT | Mod: 95

## 2024-11-07 RX ORDER — MEMANTINE HYDROCHLORIDE 5 MG/1
5 TABLET, FILM COATED ORAL
Qty: 60 | Refills: 5 | Status: ACTIVE | COMMUNITY
Start: 2024-11-07 | End: 1900-01-01

## 2024-11-08 LAB
ALBUMIN SERPL ELPH-MCNC: 3.9 G/DL
ALP BLD-CCNC: 49 U/L
ALT SERPL-CCNC: 15 U/L
ANION GAP SERPL CALC-SCNC: 9 MMOL/L
AST SERPL-CCNC: 24 U/L
BASOPHILS # BLD AUTO: 0.02 K/UL
BASOPHILS NFR BLD AUTO: 0.3 %
BILIRUB SERPL-MCNC: 0.4 MG/DL
BUN SERPL-MCNC: 19 MG/DL
CALCIUM SERPL-MCNC: 8.9 MG/DL
CHLORIDE SERPL-SCNC: 103 MMOL/L
CO2 SERPL-SCNC: 25 MMOL/L
CREAT SERPL-MCNC: 1.84 MG/DL
EGFR: 39 ML/MIN/1.73M2
EOSINOPHIL # BLD AUTO: 0.1 K/UL
EOSINOPHIL NFR BLD AUTO: 1.4 %
GLUCOSE SERPL-MCNC: 94 MG/DL
HCT VFR BLD CALC: 43.1 %
HGB BLD-MCNC: 13.7 G/DL
IMM GRANULOCYTES NFR BLD AUTO: 0.3 %
LYMPHOCYTES # BLD AUTO: 2.63 K/UL
LYMPHOCYTES NFR BLD AUTO: 37.4 %
MAN DIFF?: NORMAL
MCHC RBC-ENTMCNC: 29.7 PG
MCHC RBC-ENTMCNC: 31.8 G/DL
MCV RBC AUTO: 93.3 FL
MONOCYTES # BLD AUTO: 0.63 K/UL
MONOCYTES NFR BLD AUTO: 8.9 %
NEUTROPHILS # BLD AUTO: 3.64 K/UL
NEUTROPHILS NFR BLD AUTO: 51.7 %
PLATELET # BLD AUTO: 186 K/UL
POTASSIUM SERPL-SCNC: 4.8 MMOL/L
PROT SERPL-MCNC: 5.6 G/DL
RBC # BLD: 4.62 M/UL
RBC # FLD: 14.6 %
SODIUM SERPL-SCNC: 137 MMOL/L
WBC # FLD AUTO: 7.04 K/UL

## 2024-11-11 LAB
ALBUMIN SERPL ELPH-MCNC: 4.2 G/DL
ALP BLD-CCNC: 55 U/L
ALT SERPL-CCNC: 15 U/L
ANION GAP SERPL CALC-SCNC: 7 MMOL/L
AST SERPL-CCNC: 20 U/L
BILIRUB SERPL-MCNC: 0.3 MG/DL
BUN SERPL-MCNC: 14 MG/DL
CALCIUM SERPL-MCNC: 8.8 MG/DL
CHLORIDE SERPL-SCNC: 110 MMOL/L
CO2 SERPL-SCNC: 27 MMOL/L
CREAT SERPL-MCNC: 1.38 MG/DL
EGFR: 55 ML/MIN/1.73M2
GLUCOSE SERPL-MCNC: 101 MG/DL
IL8 SERPL-MCNC: <3 PG/ML
POTASSIUM SERPL-SCNC: 4.7 MMOL/L
PROT SERPL-MCNC: 5.9 G/DL
SODIUM SERPL-SCNC: 144 MMOL/L

## 2024-11-15 LAB
ANION GAP SERPL CALC-SCNC: 11 MMOL/L
BUN SERPL-MCNC: 18 MG/DL
CALCIUM SERPL-MCNC: 9.4 MG/DL
CHLORIDE SERPL-SCNC: 106 MMOL/L
CO2 SERPL-SCNC: 23 MMOL/L
CREAT SERPL-MCNC: 1.42 MG/DL
EGFR: 53 ML/MIN/1.73M2
GLUCOSE SERPL-MCNC: 101 MG/DL
POTASSIUM SERPL-SCNC: 4.9 MMOL/L
SODIUM SERPL-SCNC: 140 MMOL/L

## 2024-11-18 DIAGNOSIS — Z01.818 ENCOUNTER FOR OTHER PREPROCEDURAL EXAMINATION: ICD-10-CM

## 2024-11-19 LAB
ALBUMIN SERPL ELPH-MCNC: 4.4 G/DL
ALP BLD-CCNC: 57 U/L
ALT SERPL-CCNC: 11 U/L
ANION GAP SERPL CALC-SCNC: 8 MMOL/L
AST SERPL-CCNC: 18 U/L
BASOPHILS # BLD AUTO: 0.03 K/UL
BASOPHILS NFR BLD AUTO: 0.4 %
BILIRUB SERPL-MCNC: 0.5 MG/DL
BUN SERPL-MCNC: 12 MG/DL
CALCIUM SERPL-MCNC: 9.5 MG/DL
CHLORIDE SERPL-SCNC: 109 MMOL/L
CO2 SERPL-SCNC: 27 MMOL/L
CREAT SERPL-MCNC: 1.37 MG/DL
EGFR: 55 ML/MIN/1.73M2
EOSINOPHIL # BLD AUTO: 0.17 K/UL
EOSINOPHIL NFR BLD AUTO: 2.4 %
GLUCOSE SERPL-MCNC: 78 MG/DL
HCT VFR BLD CALC: 47.1 %
HGB BLD-MCNC: 14.9 G/DL
IMM GRANULOCYTES NFR BLD AUTO: 0.3 %
LITHIUM SERPL-SCNC: 0.36 MMOL/L
LYMPHOCYTES # BLD AUTO: 2.69 K/UL
LYMPHOCYTES NFR BLD AUTO: 38.7 %
MAN DIFF?: NORMAL
MCHC RBC-ENTMCNC: 29.2 PG
MCHC RBC-ENTMCNC: 31.6 G/DL
MCV RBC AUTO: 92.4 FL
MONOCYTES # BLD AUTO: 0.74 K/UL
MONOCYTES NFR BLD AUTO: 10.6 %
NEUTROPHILS # BLD AUTO: 3.3 K/UL
NEUTROPHILS NFR BLD AUTO: 47.6 %
PLATELET # BLD AUTO: 205 K/UL
POTASSIUM SERPL-SCNC: 4.4 MMOL/L
PROT SERPL-MCNC: 6.1 G/DL
RBC # BLD: 5.1 M/UL
RBC # FLD: 15.3 %
SODIUM SERPL-SCNC: 144 MMOL/L
WBC # FLD AUTO: 6.95 K/UL

## 2024-11-22 LAB
ANION GAP SERPL CALC-SCNC: 6 MMOL/L
BUN SERPL-MCNC: 25 MG/DL
CALCIUM SERPL-MCNC: 9.2 MG/DL
CHLORIDE SERPL-SCNC: 104 MMOL/L
CO2 SERPL-SCNC: 27 MMOL/L
CREAT SERPL-MCNC: 1.39 MG/DL
EGFR: 55 ML/MIN/1.73M2
GLUCOSE SERPL-MCNC: 99 MG/DL
POTASSIUM SERPL-SCNC: 4.4 MMOL/L
SODIUM SERPL-SCNC: 137 MMOL/L

## 2024-11-25 LAB — IL8 SERPL-MCNC: <3 PG/ML

## 2024-12-01 LAB
ANION GAP SERPL CALC-SCNC: 10 MMOL/L
BUN SERPL-MCNC: 26 MG/DL
CALCIUM SERPL-MCNC: 9.1 MG/DL
CHLORIDE SERPL-SCNC: 104 MMOL/L
CO2 SERPL-SCNC: 26 MMOL/L
CREAT SERPL-MCNC: 1.51 MG/DL
EGFR: 49 ML/MIN/1.73M2
GLUCOSE SERPL-MCNC: 68 MG/DL
POTASSIUM SERPL-SCNC: 4.9 MMOL/L
SODIUM SERPL-SCNC: 140 MMOL/L

## 2024-12-02 LAB
ANION GAP SERPL CALC-SCNC: 9 MMOL/L
BUN SERPL-MCNC: 24 MG/DL
CALCIUM SERPL-MCNC: 9.3 MG/DL
CHLORIDE SERPL-SCNC: 103 MMOL/L
CO2 SERPL-SCNC: 27 MMOL/L
CREAT SERPL-MCNC: 1.54 MG/DL
EGFR: 48 ML/MIN/1.73M2
GLUCOSE SERPL-MCNC: 77 MG/DL
POTASSIUM SERPL-SCNC: 4.7 MMOL/L
SODIUM SERPL-SCNC: 140 MMOL/L

## 2024-12-04 LAB
ALBUMIN SERPL ELPH-MCNC: 4.4 G/DL
ALP BLD-CCNC: 66 U/L
ALT SERPL-CCNC: 25 U/L
ANION GAP SERPL CALC-SCNC: 12 MMOL/L
AST SERPL-CCNC: 25 U/L
BASOPHILS # BLD AUTO: 0.05 K/UL
BASOPHILS NFR BLD AUTO: 0.7 %
BILIRUB SERPL-MCNC: 0.5 MG/DL
BUN SERPL-MCNC: 18 MG/DL
CALCIUM SERPL-MCNC: 9.7 MG/DL
CHLORIDE SERPL-SCNC: 103 MMOL/L
CO2 SERPL-SCNC: 24 MMOL/L
CREAT SERPL-MCNC: 1.41 MG/DL
EGFR: 54 ML/MIN/1.73M2
EOSINOPHIL # BLD AUTO: 0.21 K/UL
EOSINOPHIL NFR BLD AUTO: 2.9 %
GLUCOSE SERPL-MCNC: 79 MG/DL
HCT VFR BLD CALC: 49.3 %
HGB BLD-MCNC: 15.3 G/DL
IMM GRANULOCYTES NFR BLD AUTO: 0.8 %
LITHIUM SERPL-SCNC: 0.25 MMOL/L
LYMPHOCYTES # BLD AUTO: 2.2 K/UL
LYMPHOCYTES NFR BLD AUTO: 30.4 %
MAN DIFF?: NORMAL
MCHC RBC-ENTMCNC: 30.1 PG
MCHC RBC-ENTMCNC: 31 G/DL
MCV RBC AUTO: 97 FL
MONOCYTES # BLD AUTO: 0.58 K/UL
MONOCYTES NFR BLD AUTO: 8 %
NEUTROPHILS # BLD AUTO: 4.13 K/UL
NEUTROPHILS NFR BLD AUTO: 57.2 %
PLATELET # BLD AUTO: 190 K/UL
POTASSIUM SERPL-SCNC: 4.1 MMOL/L
PROT SERPL-MCNC: 6.6 G/DL
RBC # BLD: 5.08 M/UL
RBC # FLD: 14.6 %
SODIUM SERPL-SCNC: 139 MMOL/L
WBC # FLD AUTO: 7.23 K/UL

## 2024-12-06 LAB — IL8 SERPL-MCNC: 4.9 PG/ML

## 2024-12-10 ENCOUNTER — APPOINTMENT (OUTPATIENT)
Dept: PAIN MANAGEMENT | Facility: CLINIC | Age: 69
End: 2024-12-10

## 2024-12-10 PROCEDURE — 99203 OFFICE O/P NEW LOW 30 MIN: CPT

## 2024-12-10 RX ORDER — KETOROLAC TROMETHAMINE 10 MG/1
10 TABLET, FILM COATED ORAL
Qty: 9 | Refills: 0 | Status: ACTIVE | COMMUNITY
Start: 2024-12-10 | End: 1900-01-01

## 2024-12-12 LAB
ALBUMIN SERPL ELPH-MCNC: 4.5 G/DL
ALP BLD-CCNC: 63 U/L
ALT SERPL-CCNC: 14 U/L
ANION GAP SERPL CALC-SCNC: 10 MMOL/L
AST SERPL-CCNC: 21 U/L
BASOPHILS # BLD AUTO: 0.01 K/UL
BASOPHILS NFR BLD AUTO: 0.2 %
BILIRUB SERPL-MCNC: 0.5 MG/DL
BUN SERPL-MCNC: 20 MG/DL
CALCIUM SERPL-MCNC: 9.3 MG/DL
CHLORIDE SERPL-SCNC: 102 MMOL/L
CO2 SERPL-SCNC: 24 MMOL/L
CREAT SERPL-MCNC: 1.51 MG/DL
EGFR: 49 ML/MIN/1.73M2
EOSINOPHIL # BLD AUTO: 0 K/UL
EOSINOPHIL NFR BLD AUTO: 0 %
GLUCOSE SERPL-MCNC: 124 MG/DL
HCT VFR BLD CALC: 45.1 %
HGB BLD-MCNC: 14.4 G/DL
IMM GRANULOCYTES NFR BLD AUTO: 0.3 %
LYMPHOCYTES # BLD AUTO: 1.29 K/UL
LYMPHOCYTES NFR BLD AUTO: 20.3 %
MAN DIFF?: NORMAL
MCHC RBC-ENTMCNC: 29.7 PG
MCHC RBC-ENTMCNC: 31.9 G/DL
MCV RBC AUTO: 93 FL
MONOCYTES # BLD AUTO: 0.16 K/UL
MONOCYTES NFR BLD AUTO: 2.5 %
NEUTROPHILS # BLD AUTO: 4.87 K/UL
NEUTROPHILS NFR BLD AUTO: 76.7 %
PLATELET # BLD AUTO: 180 K/UL
POTASSIUM SERPL-SCNC: 5 MMOL/L
PROT SERPL-MCNC: 6.5 G/DL
RBC # BLD: 4.85 M/UL
RBC # FLD: 14.8 %
SODIUM SERPL-SCNC: 136 MMOL/L
WBC # FLD AUTO: 6.35 K/UL

## 2024-12-12 RX ORDER — UBROGEPANT 100 MG/1
100 TABLET ORAL
Qty: 3 | Refills: 3 | Status: ACTIVE | COMMUNITY
Start: 2024-12-10 | End: 1900-01-01

## 2024-12-17 LAB
ALBUMIN SERPL ELPH-MCNC: 4.3 G/DL
ALP BLD-CCNC: 59 U/L
ALT SERPL-CCNC: 15 U/L
ANION GAP SERPL CALC-SCNC: 6 MMOL/L
AST SERPL-CCNC: 19 U/L
BASOPHILS # BLD AUTO: 0.02 K/UL
BASOPHILS NFR BLD AUTO: 0.3 %
BILIRUB SERPL-MCNC: 0.3 MG/DL
BUN SERPL-MCNC: 18 MG/DL
CALCIUM SERPL-MCNC: 9 MG/DL
CHLORIDE SERPL-SCNC: 105 MMOL/L
CO2 SERPL-SCNC: 27 MMOL/L
CREAT SERPL-MCNC: 1.2 MG/DL
EGFR: 65 ML/MIN/1.73M2
EOSINOPHIL # BLD AUTO: 0.02 K/UL
EOSINOPHIL NFR BLD AUTO: 0.3 %
GLUCOSE SERPL-MCNC: 105 MG/DL
HCT VFR BLD CALC: 42.3 %
HGB BLD-MCNC: 13.5 G/DL
IMM GRANULOCYTES NFR BLD AUTO: 0.5 %
LYMPHOCYTES # BLD AUTO: 1.81 K/UL
LYMPHOCYTES NFR BLD AUTO: 28.2 %
MAN DIFF?: NORMAL
MCHC RBC-ENTMCNC: 29.7 PG
MCHC RBC-ENTMCNC: 31.9 G/DL
MCV RBC AUTO: 93.2 FL
MONOCYTES # BLD AUTO: 0.36 K/UL
MONOCYTES NFR BLD AUTO: 5.6 %
NEUTROPHILS # BLD AUTO: 4.17 K/UL
NEUTROPHILS NFR BLD AUTO: 65.1 %
PLATELET # BLD AUTO: 174 K/UL
PMV BLD AUTO: 0 /100 WBCS
POTASSIUM SERPL-SCNC: 4.5 MMOL/L
PROT SERPL-MCNC: 5.8 G/DL
RBC # BLD: 4.54 M/UL
RBC # FLD: 14.5 %
SODIUM SERPL-SCNC: 138 MMOL/L
WBC # FLD AUTO: 6.41 K/UL

## 2024-12-19 RX ORDER — CALCIUM/D3/MAG OX/COP/MANG/ZN 600 MG-20
600-800 TABLET ORAL
Qty: 1 | Refills: 1 | Status: ACTIVE | COMMUNITY
Start: 2024-12-19 | End: 1900-01-01

## 2024-12-20 RX ORDER — CYCLOPHOSPHAMIDE 25 MG/1
25 TABLET ORAL DAILY
Qty: 90 | Refills: 0 | Status: ACTIVE | COMMUNITY
Start: 2024-12-20 | End: 1900-01-01

## 2025-01-07 LAB
ALBUMIN SERPL ELPH-MCNC: 4.5 G/DL
ALP BLD-CCNC: 78 U/L
ALT SERPL-CCNC: 13 U/L
ANION GAP SERPL CALC-SCNC: 18 MMOL/L
AST SERPL-CCNC: 21 U/L
BASOPHILS # BLD AUTO: 0.02 K/UL
BASOPHILS NFR BLD AUTO: 0.4 %
BILIRUB SERPL-MCNC: 0.5 MG/DL
BUN SERPL-MCNC: 16 MG/DL
CALCIUM SERPL-MCNC: 10.2 MG/DL
CHLORIDE SERPL-SCNC: 103 MMOL/L
CO2 SERPL-SCNC: 19 MMOL/L
CREAT SERPL-MCNC: 1.36 MG/DL
CRP SERPL-MCNC: <3 MG/L
EGFR: 56 ML/MIN/1.73M2
EOSINOPHIL # BLD AUTO: 0.23 K/UL
EOSINOPHIL NFR BLD AUTO: 4.8 %
GLUCOSE SERPL-MCNC: 100 MG/DL
HCT VFR BLD CALC: 39.5 %
HGB BLD-MCNC: 11.8 G/DL
IMM GRANULOCYTES NFR BLD AUTO: 0.4 %
LYMPHOCYTES # BLD AUTO: 1.67 K/UL
LYMPHOCYTES NFR BLD AUTO: 34.6 %
MAN DIFF?: NORMAL
MCHC RBC-ENTMCNC: 29.4 PG
MCHC RBC-ENTMCNC: 29.9 G/DL
MCV RBC AUTO: 98.3 FL
MONOCYTES # BLD AUTO: 0.57 K/UL
MONOCYTES NFR BLD AUTO: 11.8 %
NEUTROPHILS # BLD AUTO: 2.31 K/UL
NEUTROPHILS NFR BLD AUTO: 48 %
PLATELET # BLD AUTO: 144 K/UL
POTASSIUM SERPL-SCNC: 5.2 MMOL/L
PROT SERPL-MCNC: 6.9 G/DL
RBC # BLD: 4.02 M/UL
RBC # FLD: 14.3 %
SODIUM SERPL-SCNC: 139 MMOL/L
WBC # FLD AUTO: 4.82 K/UL

## 2025-01-08 ENCOUNTER — APPOINTMENT (OUTPATIENT)
Dept: PAIN MANAGEMENT | Facility: CLINIC | Age: 70
End: 2025-01-08

## 2025-01-08 LAB — ERYTHROCYTE [SEDIMENTATION RATE] IN BLOOD BY WESTERGREN METHOD: 2 MM/HR

## 2025-01-13 RX ORDER — BUTALBITAL, ACETAMINOPHEN AND CAFFEINE 325; 50; 40 MG/1; MG/1; MG/1
50-325-40 TABLET ORAL
Qty: 90 | Refills: 0 | Status: ACTIVE | COMMUNITY
Start: 2025-01-13 | End: 1900-01-01

## 2025-01-13 RX ORDER — BUTALBITAL, ACETAMINOPHEN, AND CAFFEINE 50; 300; 40 MG/1; MG/1; MG/1
50-300-40 CAPSULE ORAL 3 TIMES DAILY
Qty: 90 | Refills: 0 | Status: DISCONTINUED | COMMUNITY
Start: 2025-01-13 | End: 2025-01-13

## 2025-01-13 RX ORDER — BUTALBITAL, ACETAMINOPHEN AND CAFFEINE 300; 50; 40 MG/1; MG/1; MG/1
50-300-40 CAPSULE ORAL 3 TIMES DAILY
Qty: 90 | Refills: 0 | Status: DISCONTINUED | COMMUNITY
Start: 2025-01-13 | End: 2025-01-13

## 2025-01-17 ENCOUNTER — TRANSCRIPTION ENCOUNTER (OUTPATIENT)
Age: 70
End: 2025-01-17

## 2025-01-18 LAB
ALBUMIN SERPL ELPH-MCNC: 3.9 G/DL
ALP BLD-CCNC: 66 U/L
ALT SERPL-CCNC: 12 U/L
ANION GAP SERPL CALC-SCNC: 9 MMOL/L
AST SERPL-CCNC: 18 U/L
BASOPHILS # BLD AUTO: 0.02 K/UL
BASOPHILS NFR BLD AUTO: 0.4 %
BILIRUB SERPL-MCNC: 0.2 MG/DL
BUN SERPL-MCNC: 23 MG/DL
CALCIUM SERPL-MCNC: 8.9 MG/DL
CHLORIDE SERPL-SCNC: 108 MMOL/L
CO2 SERPL-SCNC: 26 MMOL/L
CREAT SERPL-MCNC: 1.27 MG/DL
EGFR: 61 ML/MIN/1.73M2
EOSINOPHIL # BLD AUTO: 0.15 K/UL
EOSINOPHIL NFR BLD AUTO: 2.8 %
GLUCOSE SERPL-MCNC: 104 MG/DL
HCT VFR BLD CALC: 44.9 %
HGB BLD-MCNC: 14.1 G/DL
IMM GRANULOCYTES NFR BLD AUTO: 0.2 %
LYMPHOCYTES # BLD AUTO: 1.86 K/UL
LYMPHOCYTES NFR BLD AUTO: 34.6 %
MAN DIFF?: NORMAL
MCHC RBC-ENTMCNC: 29.4 PG
MCHC RBC-ENTMCNC: 31.4 G/DL
MCV RBC AUTO: 93.7 FL
MONOCYTES # BLD AUTO: 0.78 K/UL
MONOCYTES NFR BLD AUTO: 14.5 %
NEUTROPHILS # BLD AUTO: 2.56 K/UL
NEUTROPHILS NFR BLD AUTO: 47.5 %
PLATELET # BLD AUTO: 153 K/UL
POTASSIUM SERPL-SCNC: 4.6 MMOL/L
PROT SERPL-MCNC: 5.7 G/DL
PSA SERPL-MCNC: 1.09 NG/ML
RBC # BLD: 4.79 M/UL
RBC # FLD: 13.3 %
SODIUM SERPL-SCNC: 143 MMOL/L
WBC # FLD AUTO: 5.38 K/UL

## 2025-01-21 LAB — IL8 SERPL-MCNC: <3 PG/ML

## 2025-01-25 LAB
ALBUMIN SERPL ELPH-MCNC: 4 G/DL
ALP BLD-CCNC: 62 U/L
ALT SERPL-CCNC: 13 U/L
ANION GAP SERPL CALC-SCNC: 13 MMOL/L
AST SERPL-CCNC: 20 U/L
BASOPHILS # BLD AUTO: 0.03 K/UL
BASOPHILS NFR BLD AUTO: 0.5 %
BILIRUB SERPL-MCNC: 0.4 MG/DL
BUN SERPL-MCNC: 18 MG/DL
CALCIUM SERPL-MCNC: 9.5 MG/DL
CHLORIDE SERPL-SCNC: 107 MMOL/L
CO2 SERPL-SCNC: 23 MMOL/L
CREAT SERPL-MCNC: 1.34 MG/DL
CYSTATIN C SERPL-MCNC: 0.96 MG/L
EGFR: 57 ML/MIN/1.73M2
EOSINOPHIL # BLD AUTO: 0.17 K/UL
EOSINOPHIL NFR BLD AUTO: 2.9 %
GFR/BSA.PRED SERPLBLD CYS-BASED-ARV: 79 ML/MIN/1.73M2
GLUCOSE SERPL-MCNC: 92 MG/DL
HCT VFR BLD CALC: 46.7 %
HGB BLD-MCNC: 14.5 G/DL
IMM GRANULOCYTES NFR BLD AUTO: 0.3 %
LYMPHOCYTES # BLD AUTO: 1.96 K/UL
LYMPHOCYTES NFR BLD AUTO: 33.7 %
MAN DIFF?: NORMAL
MCHC RBC-ENTMCNC: 29 PG
MCHC RBC-ENTMCNC: 31 G/DL
MCV RBC AUTO: 93.4 FL
MONOCYTES # BLD AUTO: 0.56 K/UL
MONOCYTES NFR BLD AUTO: 9.6 %
NEUTROPHILS # BLD AUTO: 3.07 K/UL
NEUTROPHILS NFR BLD AUTO: 53 %
PLATELET # BLD AUTO: 158 K/UL
POTASSIUM SERPL-SCNC: 4.5 MMOL/L
PROT SERPL-MCNC: 5.9 G/DL
PSA SERPL-MCNC: 1.24 NG/ML
RBC # BLD: 5 M/UL
RBC # FLD: 13.5 %
SODIUM SERPL-SCNC: 144 MMOL/L
WBC # FLD AUTO: 5.81 K/UL

## 2025-01-28 ENCOUNTER — TRANSCRIPTION ENCOUNTER (OUTPATIENT)
Age: 70
End: 2025-01-28

## 2025-02-03 LAB
ALBUMIN SERPL ELPH-MCNC: 3.8 G/DL
ALP BLD-CCNC: 63 U/L
ALT SERPL-CCNC: 12 U/L
ANION GAP SERPL CALC-SCNC: 10 MMOL/L
AST SERPL-CCNC: 16 U/L
BILIRUB SERPL-MCNC: 0.4 MG/DL
BUN SERPL-MCNC: 14 MG/DL
CALCIUM SERPL-MCNC: 9.2 MG/DL
CHLORIDE SERPL-SCNC: 103 MMOL/L
CO2 SERPL-SCNC: 28 MMOL/L
CREAT SERPL-MCNC: 1.35 MG/DL
EGFR: 56 ML/MIN/1.73M2
GLUCOSE SERPL-MCNC: 121 MG/DL
POTASSIUM SERPL-SCNC: 4.2 MMOL/L
PROT SERPL-MCNC: 5.8 G/DL
SODIUM SERPL-SCNC: 140 MMOL/L

## 2025-02-21 LAB
BASOPHILS # BLD AUTO: 0.02 K/UL
BASOPHILS NFR BLD AUTO: 0.3 %
EOSINOPHIL # BLD AUTO: 0.13 K/UL
EOSINOPHIL NFR BLD AUTO: 1.8 %
HCT VFR BLD CALC: 46.5 %
HGB BLD-MCNC: 14.8 G/DL
IMM GRANULOCYTES NFR BLD AUTO: 0.4 %
LYMPHOCYTES # BLD AUTO: 2.48 K/UL
LYMPHOCYTES NFR BLD AUTO: 35.1 %
MAN DIFF?: NORMAL
MCHC RBC-ENTMCNC: 29 PG
MCHC RBC-ENTMCNC: 31.8 G/DL
MCV RBC AUTO: 91 FL
MONOCYTES # BLD AUTO: 0.77 K/UL
MONOCYTES NFR BLD AUTO: 10.9 %
NEUTROPHILS # BLD AUTO: 3.64 K/UL
NEUTROPHILS NFR BLD AUTO: 51.5 %
PLATELET # BLD AUTO: 170 K/UL
PMV BLD AUTO: 0 /100 WBCS
RBC # BLD: 5.11 M/UL
RBC # FLD: 14.2 %
WBC # FLD AUTO: 7.07 K/UL

## 2025-02-24 LAB
ALBUMIN SERPL ELPH-MCNC: 4.4 G/DL
ALP BLD-CCNC: 72 U/L
ALT SERPL-CCNC: 17 U/L
ANION GAP SERPL CALC-SCNC: 11 MMOL/L
AST SERPL-CCNC: 23 U/L
BILIRUB SERPL-MCNC: 0.5 MG/DL
BUN SERPL-MCNC: 19 MG/DL
CALCIUM SERPL-MCNC: 9.5 MG/DL
CHLORIDE SERPL-SCNC: 103 MMOL/L
CO2 SERPL-SCNC: 26 MMOL/L
CREAT SERPL-MCNC: 1.4 MG/DL
CYSTATIN C SERPL-MCNC: 1.04 MG/L
EGFR: 54 ML/MIN/1.73M2
GFR/BSA.PRED SERPLBLD CYS-BASED-ARV: 71 ML/MIN/1.73M2
GLUCOSE SERPL-MCNC: 107 MG/DL
IL8 SERPL-MCNC: <3 PG/ML
POTASSIUM SERPL-SCNC: 4.6 MMOL/L
PROT SERPL-MCNC: 6.1 G/DL
SODIUM SERPL-SCNC: 140 MMOL/L

## 2025-02-25 LAB — IL6 SERPL-MCNC: 9.2 PG/ML

## 2025-02-26 RX ORDER — TRAMADOL HYDROCHLORIDE 50 MG/1
50 TABLET, COATED ORAL
Qty: 90 | Refills: 0 | Status: ACTIVE | COMMUNITY
Start: 2025-02-26 | End: 1900-01-01

## 2025-03-11 ENCOUNTER — RX RENEWAL (OUTPATIENT)
Age: 70
End: 2025-03-11

## 2025-03-14 DIAGNOSIS — M31.6 OTHER GIANT CELL ARTERITIS: ICD-10-CM

## 2025-03-14 DIAGNOSIS — G04.81 OTHER ENCEPHALITIS AND ENCEPHALOMYELITIS: ICD-10-CM

## 2025-03-15 LAB
ALBUMIN SERPL ELPH-MCNC: 4 G/DL
ALP BLD-CCNC: 66 U/L
ALT SERPL-CCNC: 15 U/L
ANION GAP SERPL CALC-SCNC: 12 MMOL/L
AST SERPL-CCNC: 18 U/L
BASOPHILS # BLD AUTO: 0.01 K/UL
BASOPHILS NFR BLD AUTO: 0.1 %
BILIRUB SERPL-MCNC: 0.4 MG/DL
BUN SERPL-MCNC: 23 MG/DL
CALCIUM SERPL-MCNC: 9 MG/DL
CHLORIDE SERPL-SCNC: 101 MMOL/L
CO2 SERPL-SCNC: 25 MMOL/L
CREAT SERPL-MCNC: 1.31 MG/DL
CRP SERPL-MCNC: <3 MG/L
CYSTATIN C SERPL-MCNC: 1.13 MG/L
EGFRCR SERPLBLD CKD-EPI 2021: 58 ML/MIN/1.73M2
EOSINOPHIL # BLD AUTO: 0.05 K/UL
EOSINOPHIL NFR BLD AUTO: 0.7 %
ERYTHROCYTE [SEDIMENTATION RATE] IN BLOOD BY WESTERGREN METHOD: 10 MM/HR
GFR/BSA.PRED SERPLBLD CYS-BASED-ARV: 63 ML/MIN/1.73M2
GLUCOSE SERPL-MCNC: 45 MG/DL
HCT VFR BLD CALC: 46.8 %
HGB BLD-MCNC: 15.1 G/DL
IMM GRANULOCYTES NFR BLD AUTO: 0.7 %
LYMPHOCYTES # BLD AUTO: 2.72 K/UL
LYMPHOCYTES NFR BLD AUTO: 37 %
MAGNESIUM SERPL-MCNC: 2.1 MG/DL
MAN DIFF?: NORMAL
MCHC RBC-ENTMCNC: 28.9 PG
MCHC RBC-ENTMCNC: 32.3 G/DL
MCV RBC AUTO: 89.7 FL
MONOCYTES # BLD AUTO: 0.78 K/UL
MONOCYTES NFR BLD AUTO: 10.6 %
NEUTROPHILS # BLD AUTO: 3.74 K/UL
NEUTROPHILS NFR BLD AUTO: 50.9 %
PHOSPHATE SERPL-MCNC: 2.9 MG/DL
PLATELET # BLD AUTO: 173 K/UL
POTASSIUM SERPL-SCNC: 5.1 MMOL/L
PROT SERPL-MCNC: 5.8 G/DL
RBC # BLD: 5.22 M/UL
RBC # FLD: 14.8 %
SODIUM SERPL-SCNC: 138 MMOL/L
TSH SERPL-ACNC: 1.32 UIU/ML
WBC # FLD AUTO: 7.35 K/UL

## 2025-03-16 LAB
APPEARANCE: CLEAR
BILIRUBIN URINE: NEGATIVE
BLOOD URINE: NEGATIVE
COLOR: YELLOW
CREAT SPEC-SCNC: 103 MG/DL
GLUCOSE QUALITATIVE U: NEGATIVE MG/DL
KETONES URINE: NEGATIVE MG/DL
LEUKOCYTE ESTERASE URINE: NEGATIVE
MICROALBUMIN 24H UR DL<=1MG/L-MCNC: <1.2 MG/DL
MICROALBUMIN/CREAT 24H UR-RTO: NORMAL MG/G
NITRITE URINE: NEGATIVE
PH URINE: 5.5
PROTEIN URINE: NEGATIVE MG/DL
SPECIFIC GRAVITY URINE: 1.02
UROBILINOGEN URINE: 0.2 MG/DL

## 2025-03-17 LAB
IL6 SERPL-MCNC: 2.5 PG/ML
IL8 SERPL-MCNC: 7.2 PG/ML

## 2025-04-04 ENCOUNTER — APPOINTMENT (OUTPATIENT)
Dept: PAIN MANAGEMENT | Facility: CLINIC | Age: 70
End: 2025-04-04

## 2025-04-08 ENCOUNTER — RX RENEWAL (OUTPATIENT)
Age: 70
End: 2025-04-08

## 2025-04-09 ENCOUNTER — APPOINTMENT (OUTPATIENT)
Dept: RADIOLOGY | Facility: CLINIC | Age: 70
End: 2025-04-09
Payer: COMMERCIAL

## 2025-04-09 ENCOUNTER — OUTPATIENT (OUTPATIENT)
Dept: OUTPATIENT SERVICES | Facility: HOSPITAL | Age: 70
LOS: 1 days | End: 2025-04-09

## 2025-04-09 DIAGNOSIS — G90.09 OTHER IDIOPATHIC PERIPHERAL AUTONOMIC NEUROPATHY: Chronic | ICD-10-CM

## 2025-04-09 DIAGNOSIS — Z98.890 OTHER SPECIFIED POSTPROCEDURAL STATES: Chronic | ICD-10-CM

## 2025-04-09 DIAGNOSIS — J34.2 DEVIATED NASAL SEPTUM: Chronic | ICD-10-CM

## 2025-04-09 DIAGNOSIS — R05.3 CHRONIC COUGH: ICD-10-CM

## 2025-04-09 DIAGNOSIS — Z98.42 CATARACT EXTRACTION STATUS, LEFT EYE: Chronic | ICD-10-CM

## 2025-04-09 DIAGNOSIS — Z90.49 ACQUIRED ABSENCE OF OTHER SPECIFIED PARTS OF DIGESTIVE TRACT: Chronic | ICD-10-CM

## 2025-04-09 DIAGNOSIS — H26.9 UNSPECIFIED CATARACT: Chronic | ICD-10-CM

## 2025-04-09 LAB
BASOPHILS # BLD AUTO: 0.02 K/UL
BASOPHILS NFR BLD AUTO: 0.4 %
EOSINOPHIL # BLD AUTO: 0.12 K/UL
EOSINOPHIL NFR BLD AUTO: 2.3 %
ERYTHROCYTE [SEDIMENTATION RATE] IN BLOOD BY WESTERGREN METHOD: 36 MM/HR
HCT VFR BLD CALC: 47.9 %
HGB BLD-MCNC: 15.2 G/DL
IMM GRANULOCYTES NFR BLD AUTO: 0.4 %
LYMPHOCYTES # BLD AUTO: 2.02 K/UL
LYMPHOCYTES NFR BLD AUTO: 38.3 %
MAN DIFF?: NORMAL
MCHC RBC-ENTMCNC: 28.8 PG
MCHC RBC-ENTMCNC: 31.7 G/DL
MCV RBC AUTO: 90.7 FL
MONOCYTES # BLD AUTO: 0.72 K/UL
MONOCYTES NFR BLD AUTO: 13.6 %
NEUTROPHILS # BLD AUTO: 2.38 K/UL
NEUTROPHILS NFR BLD AUTO: 45 %
PLATELET # BLD AUTO: 152 K/UL
RBC # BLD: 5.28 M/UL
RBC # FLD: 15.6 %
WBC # FLD AUTO: 5.28 K/UL

## 2025-04-09 PROCEDURE — 71046 X-RAY EXAM CHEST 2 VIEWS: CPT | Mod: 26

## 2025-04-10 LAB
ALBUMIN SERPL ELPH-MCNC: 4.2 G/DL
ALP BLD-CCNC: 71 U/L
ALT SERPL-CCNC: 14 U/L
ANION GAP SERPL CALC-SCNC: 10 MMOL/L
AST SERPL-CCNC: 21 U/L
BILIRUB SERPL-MCNC: 0.5 MG/DL
BUN SERPL-MCNC: 24 MG/DL
CALCIUM SERPL-MCNC: 9.2 MG/DL
CHLORIDE SERPL-SCNC: 104 MMOL/L
CO2 SERPL-SCNC: 26 MMOL/L
CREAT SERPL-MCNC: 1.52 MG/DL
CRP SERPL-MCNC: <3 MG/L
EGFRCR SERPLBLD CKD-EPI 2021: 49 ML/MIN/1.73M2
GLUCOSE SERPL-MCNC: 94 MG/DL
POTASSIUM SERPL-SCNC: 4.8 MMOL/L
PROT SERPL-MCNC: 6.2 G/DL
SODIUM SERPL-SCNC: 140 MMOL/L

## 2025-04-24 RX ORDER — METHYLPHENIDATE HYDROCHLORIDE 10 MG/1
10 CAPSULE, EXTENDED RELEASE ORAL
Qty: 30 | Refills: 0 | Status: ACTIVE | COMMUNITY
Start: 2025-04-24 | End: 1900-01-01

## 2025-04-28 ENCOUNTER — LABORATORY RESULT (OUTPATIENT)
Age: 70
End: 2025-04-28

## 2025-04-28 RX ORDER — AMITRIPTYLINE HYDROCHLORIDE 10 MG/1
10 TABLET, FILM COATED ORAL 3 TIMES DAILY
Qty: 270 | Refills: 3 | Status: ACTIVE | COMMUNITY
Start: 2025-04-28 | End: 1900-01-01

## 2025-04-29 LAB
ALBUMIN SERPL ELPH-MCNC: 4.2 G/DL
ALP BLD-CCNC: 66 U/L
ALT SERPL-CCNC: 19 U/L
ANION GAP SERPL CALC-SCNC: 12 MMOL/L
AST SERPL-CCNC: 28 U/L
BASOPHILS # BLD AUTO: 0.02 K/UL
BASOPHILS NFR BLD AUTO: 0.4 %
BILIRUB SERPL-MCNC: 0.6 MG/DL
BUN SERPL-MCNC: 17 MG/DL
CALCIUM SERPL-MCNC: 9.7 MG/DL
CHLORIDE SERPL-SCNC: 105 MMOL/L
CO2 SERPL-SCNC: 22 MMOL/L
CREAT SERPL-MCNC: 1.49 MG/DL
CRP SERPL-MCNC: <3 MG/L
EGFRCR SERPLBLD CKD-EPI 2021: 50 ML/MIN/1.73M2
EOSINOPHIL # BLD AUTO: 0.08 K/UL
EOSINOPHIL NFR BLD AUTO: 1.6 %
GLUCOSE SERPL-MCNC: 109 MG/DL
HCT VFR BLD CALC: 46.2 %
HGB BLD-MCNC: 14.3 G/DL
IMM GRANULOCYTES NFR BLD AUTO: 0.2 %
LYMPHOCYTES # BLD AUTO: 1.91 K/UL
LYMPHOCYTES NFR BLD AUTO: 38 %
MAN DIFF?: NORMAL
MCHC RBC-ENTMCNC: 28.4 PG
MCHC RBC-ENTMCNC: 31 G/DL
MCV RBC AUTO: 91.8 FL
MONOCYTES # BLD AUTO: 0.63 K/UL
MONOCYTES NFR BLD AUTO: 12.5 %
NEUTROPHILS # BLD AUTO: 2.38 K/UL
NEUTROPHILS NFR BLD AUTO: 47.3 %
PLATELET # BLD AUTO: 158 K/UL
POTASSIUM SERPL-SCNC: 4.6 MMOL/L
PROT SERPL-MCNC: 6 G/DL
PSA SERPL-MCNC: 1.24 NG/ML
RBC # BLD: 5.03 M/UL
RBC # FLD: 15.9 %
SODIUM SERPL-SCNC: 138 MMOL/L
WBC # FLD AUTO: 5.03 K/UL

## 2025-05-01 LAB — ERYTHROCYTE [SEDIMENTATION RATE] IN BLOOD BY WESTERGREN METHOD: 20 MM/HR

## 2025-05-02 LAB
PETH 16:0/18:1: NEGATIVE NG/ML
PETH 16:0/18:2: NEGATIVE NG/ML
PETH COMMENTS: NORMAL

## 2025-05-08 LAB
ALBUMIN SERPL ELPH-MCNC: 4.1 G/DL
ALP BLD-CCNC: 59 U/L
ALT SERPL-CCNC: 21 U/L
ANION GAP SERPL CALC-SCNC: 11 MMOL/L
AST SERPL-CCNC: 28 U/L
BILIRUB SERPL-MCNC: 0.4 MG/DL
BUN SERPL-MCNC: 19 MG/DL
CALCIUM SERPL-MCNC: 9.4 MG/DL
CHLORIDE SERPL-SCNC: 104 MMOL/L
CO2 SERPL-SCNC: 25 MMOL/L
CREAT SERPL-MCNC: 1.59 MG/DL
EGFRCR SERPLBLD CKD-EPI 2021: 46 ML/MIN/1.73M2
GLUCOSE SERPL-MCNC: 95 MG/DL
POTASSIUM SERPL-SCNC: 5 MMOL/L
PROT SERPL-MCNC: 5.9 G/DL
SODIUM SERPL-SCNC: 141 MMOL/L

## 2025-05-12 RX ORDER — HYDROCHLOROTHIAZIDE 25 MG/1
25 TABLET ORAL DAILY
Qty: 90 | Refills: 3 | Status: ACTIVE | COMMUNITY
Start: 2025-05-12 | End: 1900-01-01

## 2025-05-12 RX ORDER — VERAPAMIL HYDROCHLORIDE 80 MG/1
80 TABLET ORAL
Qty: 180 | Refills: 3 | Status: ACTIVE | COMMUNITY
Start: 2025-05-12 | End: 1900-01-01

## 2025-05-14 LAB
ALBUMIN SERPL ELPH-MCNC: 3.9 G/DL
ALP BLD-CCNC: 61 U/L
ALT SERPL-CCNC: 27 U/L
ANION GAP SERPL CALC-SCNC: 13 MMOL/L
AST SERPL-CCNC: 34 U/L
BASOPHILS # BLD AUTO: 0.01 K/UL
BASOPHILS NFR BLD AUTO: 0.2 %
BILIRUB SERPL-MCNC: 0.5 MG/DL
BUN SERPL-MCNC: 16 MG/DL
CALCIUM SERPL-MCNC: 8.9 MG/DL
CHLORIDE SERPL-SCNC: 106 MMOL/L
CO2 SERPL-SCNC: 22 MMOL/L
CREAT SERPL-MCNC: 1.32 MG/DL
CRP SERPL-MCNC: <3 MG/L
EGFRCR SERPLBLD CKD-EPI 2021: 58 ML/MIN/1.73M2
EOSINOPHIL # BLD AUTO: 0.13 K/UL
EOSINOPHIL NFR BLD AUTO: 2.2 %
GLUCOSE SERPL-MCNC: 85 MG/DL
HCT VFR BLD CALC: 42.8 %
HGB BLD-MCNC: 13.6 G/DL
IMM GRANULOCYTES NFR BLD AUTO: 0.3 %
LYMPHOCYTES # BLD AUTO: 2.22 K/UL
LYMPHOCYTES NFR BLD AUTO: 37.4 %
MAN DIFF?: NORMAL
MCHC RBC-ENTMCNC: 29.1 PG
MCHC RBC-ENTMCNC: 31.8 G/DL
MCV RBC AUTO: 91.6 FL
MONOCYTES # BLD AUTO: 0.79 K/UL
MONOCYTES NFR BLD AUTO: 13.3 %
NEUTROPHILS # BLD AUTO: 2.77 K/UL
NEUTROPHILS NFR BLD AUTO: 46.6 %
PLATELET # BLD AUTO: 149 K/UL
POTASSIUM SERPL-SCNC: 5.2 MMOL/L
PROT SERPL-MCNC: 5.7 G/DL
RBC # BLD: 4.67 M/UL
RBC # FLD: 15.9 %
SODIUM SERPL-SCNC: 140 MMOL/L
WBC # FLD AUTO: 5.94 K/UL

## 2025-05-15 LAB — ERYTHROCYTE [SEDIMENTATION RATE] IN BLOOD BY WESTERGREN METHOD: 5 MM/HR

## 2025-05-19 LAB — IL8 SERPL-MCNC: 7.9 PG/ML

## 2025-05-30 NOTE — ED PROVIDER NOTE - NSDCPRINTRESULTS_ED_ALL_ED
[Time Spent: ___ minutes] : I have spent [unfilled] minutes of time on the encounter which excludes teaching and separately reported services. Patient requests all Lab, Cardiology, and Radiology Results on their Discharge Instructions

## 2025-06-11 PROBLEM — E87.6 HYPOKALEMIA: Status: ACTIVE | Noted: 2025-06-11

## 2025-06-11 RX ORDER — POTASSIUM CHLORIDE 1500 MG/1
20 TABLET, FILM COATED, EXTENDED RELEASE ORAL
Qty: 30 | Refills: 0 | Status: ACTIVE | COMMUNITY
Start: 2025-06-11 | End: 1900-01-01

## 2025-06-30 RX ORDER — BUSPIRONE HYDROCHLORIDE 10 MG/1
10 TABLET ORAL 3 TIMES DAILY
Qty: 180 | Refills: 3 | Status: ACTIVE | COMMUNITY
Start: 2025-06-30 | End: 1900-01-01

## 2025-07-03 ENCOUNTER — NON-APPOINTMENT (OUTPATIENT)
Age: 70
End: 2025-07-03

## 2025-07-07 LAB
ALBUMIN SERPL ELPH-MCNC: 4.5 G/DL
ALP BLD-CCNC: 72 U/L
ALT SERPL-CCNC: 36 U/L
ANION GAP SERPL CALC-SCNC: 14 MMOL/L
AST SERPL-CCNC: 34 U/L
BASOPHILS # BLD AUTO: 0.02 K/UL
BASOPHILS NFR BLD AUTO: 0.4 %
BILIRUB SERPL-MCNC: 0.6 MG/DL
BUN SERPL-MCNC: 16 MG/DL
CALCIUM SERPL-MCNC: 9.5 MG/DL
CHLORIDE SERPL-SCNC: 107 MMOL/L
CHOLEST SERPL-MCNC: 163 MG/DL
CO2 SERPL-SCNC: 21 MMOL/L
CREAT SERPL-MCNC: 1.26 MG/DL
CRP SERPL-MCNC: <3 MG/L
EGFRCR SERPLBLD CKD-EPI 2021: 61 ML/MIN/1.73M2
EOSINOPHIL # BLD AUTO: 0.12 K/UL
EOSINOPHIL NFR BLD AUTO: 2.5 %
ERYTHROCYTE [SEDIMENTATION RATE] IN BLOOD BY WESTERGREN METHOD: 12 MM/HR
GLUCOSE SERPL-MCNC: 89 MG/DL
HCT VFR BLD CALC: 43.2 %
HDLC SERPL-MCNC: 63 MG/DL
HGB BLD-MCNC: 13.5 G/DL
IL8 SERPL-MCNC: <3 PG/ML
IMM GRANULOCYTES NFR BLD AUTO: 0.2 %
LDLC SERPL-MCNC: 78 MG/DL
LYMPHOCYTES # BLD AUTO: 2.32 K/UL
LYMPHOCYTES NFR BLD AUTO: 48.7 %
MAN DIFF?: NORMAL
MCHC RBC-ENTMCNC: 28.7 PG
MCHC RBC-ENTMCNC: 31.3 G/DL
MCV RBC AUTO: 91.9 FL
MONOCYTES # BLD AUTO: 0.49 K/UL
MONOCYTES NFR BLD AUTO: 10.3 %
NEUTROPHILS # BLD AUTO: 1.8 K/UL
NEUTROPHILS NFR BLD AUTO: 37.9 %
NONHDLC SERPL-MCNC: 100 MG/DL
PLATELET # BLD AUTO: 167 K/UL
POTASSIUM SERPL-SCNC: 4.7 MMOL/L
PROT SERPL-MCNC: 6.7 G/DL
PSA SERPL-MCNC: 1.22 NG/ML
RBC # BLD: 4.7 M/UL
RBC # FLD: 14.2 %
SODIUM SERPL-SCNC: 142 MMOL/L
TESTOST FREE SERPL-MCNC: 14.7 PG/ML
TESTOST SERPL-MCNC: >1500 NG/DL
TRIGL SERPL-MCNC: 127 MG/DL
WBC # FLD AUTO: 4.76 K/UL

## 2025-07-08 LAB — IL6 SERPL-MCNC: 3.6 PG/ML

## 2025-07-11 ENCOUNTER — RESULT REVIEW (OUTPATIENT)
Age: 70
End: 2025-07-11

## 2025-07-11 ENCOUNTER — TRANSCRIPTION ENCOUNTER (OUTPATIENT)
Age: 70
End: 2025-07-11

## 2025-07-11 ENCOUNTER — INPATIENT (INPATIENT)
Facility: HOSPITAL | Age: 70
LOS: 0 days | Discharge: HOME | End: 2025-07-12
Attending: STUDENT IN AN ORGANIZED HEALTH CARE EDUCATION/TRAINING PROGRAM | Admitting: STUDENT IN AN ORGANIZED HEALTH CARE EDUCATION/TRAINING PROGRAM
Payer: COMMERCIAL

## 2025-07-11 VITALS
HEART RATE: 61 BPM | DIASTOLIC BLOOD PRESSURE: 68 MMHG | OXYGEN SATURATION: 98 % | HEIGHT: 67 IN | TEMPERATURE: 98 F | SYSTOLIC BLOOD PRESSURE: 125 MMHG | RESPIRATION RATE: 17 BRPM | WEIGHT: 143.08 LBS

## 2025-07-11 DIAGNOSIS — E78.5 HYPERLIPIDEMIA, UNSPECIFIED: ICD-10-CM

## 2025-07-11 DIAGNOSIS — J34.2 DEVIATED NASAL SEPTUM: Chronic | ICD-10-CM

## 2025-07-11 DIAGNOSIS — I20.0 UNSTABLE ANGINA: ICD-10-CM

## 2025-07-11 DIAGNOSIS — Z98.890 OTHER SPECIFIED POSTPROCEDURAL STATES: Chronic | ICD-10-CM

## 2025-07-11 DIAGNOSIS — G44.009 CLUSTER HEADACHE SYNDROME, UNSPECIFIED, NOT INTRACTABLE: ICD-10-CM

## 2025-07-11 DIAGNOSIS — H26.9 UNSPECIFIED CATARACT: Chronic | ICD-10-CM

## 2025-07-11 DIAGNOSIS — Z90.49 ACQUIRED ABSENCE OF OTHER SPECIFIED PARTS OF DIGESTIVE TRACT: Chronic | ICD-10-CM

## 2025-07-11 DIAGNOSIS — G90.09 OTHER IDIOPATHIC PERIPHERAL AUTONOMIC NEUROPATHY: Chronic | ICD-10-CM

## 2025-07-11 DIAGNOSIS — Z98.42 CATARACT EXTRACTION STATUS, LEFT EYE: Chronic | ICD-10-CM

## 2025-07-11 DIAGNOSIS — I10 ESSENTIAL (PRIMARY) HYPERTENSION: ICD-10-CM

## 2025-07-11 PROBLEM — R07.9 CHEST PAIN IN ADULT: Status: ACTIVE | Noted: 2025-07-11

## 2025-07-11 LAB
ADD ON TEST-SPECIMEN IN LAB: SIGNIFICANT CHANGE UP
ANION GAP SERPL CALC-SCNC: 10 MMOL/L — SIGNIFICANT CHANGE UP (ref 5–17)
APTT BLD: 27 SEC — SIGNIFICANT CHANGE UP (ref 26.1–36.8)
BASOPHILS # BLD AUTO: 0.01 K/UL — SIGNIFICANT CHANGE UP (ref 0–0.2)
BASOPHILS NFR BLD AUTO: 0.1 % — SIGNIFICANT CHANGE UP (ref 0–2)
BLD GP AB SCN SERPL QL: NEGATIVE — SIGNIFICANT CHANGE UP
BUN SERPL-MCNC: 17 MG/DL — SIGNIFICANT CHANGE UP (ref 7–23)
CALCIUM SERPL-MCNC: 9.4 MG/DL — SIGNIFICANT CHANGE UP (ref 8.4–10.5)
CHLORIDE SERPL-SCNC: 101 MMOL/L — SIGNIFICANT CHANGE UP (ref 96–108)
CO2 SERPL-SCNC: 25 MMOL/L — SIGNIFICANT CHANGE UP (ref 22–31)
CREAT SERPL-MCNC: 1.38 MG/DL — HIGH (ref 0.5–1.3)
EGFR: 55 ML/MIN/1.73M2 — LOW
EGFR: 55 ML/MIN/1.73M2 — LOW
EOSINOPHIL # BLD AUTO: 0.03 K/UL — SIGNIFICANT CHANGE UP (ref 0–0.5)
EOSINOPHIL NFR BLD AUTO: 0.4 % — SIGNIFICANT CHANGE UP (ref 0–6)
GLUCOSE SERPL-MCNC: 96 MG/DL — SIGNIFICANT CHANGE UP (ref 70–99)
HCT VFR BLD CALC: 42.2 % — SIGNIFICANT CHANGE UP (ref 39–50)
HGB BLD-MCNC: 13.7 G/DL — SIGNIFICANT CHANGE UP (ref 13–17)
IMM GRANULOCYTES # BLD AUTO: 0.02 K/UL — SIGNIFICANT CHANGE UP (ref 0–0.07)
IMM GRANULOCYTES NFR BLD AUTO: 0.3 % — SIGNIFICANT CHANGE UP (ref 0–0.9)
INR BLD: 0.96 — SIGNIFICANT CHANGE UP (ref 0.85–1.16)
LYMPHOCYTES # BLD AUTO: 0.82 K/UL — LOW (ref 1–3.3)
LYMPHOCYTES NFR BLD AUTO: 11.7 % — LOW (ref 13–44)
MCHC RBC-ENTMCNC: 29.3 PG — SIGNIFICANT CHANGE UP (ref 27–34)
MCHC RBC-ENTMCNC: 32.5 G/DL — SIGNIFICANT CHANGE UP (ref 32–36)
MCV RBC AUTO: 90.4 FL — SIGNIFICANT CHANGE UP (ref 80–100)
MONOCYTES # BLD AUTO: 0.32 K/UL — SIGNIFICANT CHANGE UP (ref 0–0.9)
MONOCYTES NFR BLD AUTO: 4.6 % — SIGNIFICANT CHANGE UP (ref 2–14)
NEUTROPHILS # BLD AUTO: 5.79 K/UL — SIGNIFICANT CHANGE UP (ref 1.8–7.4)
NEUTROPHILS NFR BLD AUTO: 82.9 % — HIGH (ref 43–77)
NRBC # BLD AUTO: 0 K/UL — SIGNIFICANT CHANGE UP (ref 0–0)
NRBC # FLD: 0 K/UL — SIGNIFICANT CHANGE UP (ref 0–0)
NRBC BLD AUTO-RTO: 0 /100 WBCS — SIGNIFICANT CHANGE UP (ref 0–0)
PLATELET # BLD AUTO: 149 K/UL — LOW (ref 150–400)
PMV BLD: 9.9 FL — SIGNIFICANT CHANGE UP (ref 7–13)
POTASSIUM SERPL-MCNC: 4.2 MMOL/L — SIGNIFICANT CHANGE UP (ref 3.5–5.3)
POTASSIUM SERPL-SCNC: 4.2 MMOL/L — SIGNIFICANT CHANGE UP (ref 3.5–5.3)
PROTHROM AB SERPL-ACNC: 11 SEC — SIGNIFICANT CHANGE UP (ref 9.9–13.4)
RBC # BLD: 4.67 M/UL — SIGNIFICANT CHANGE UP (ref 4.2–5.8)
RBC # FLD: 13.6 % — SIGNIFICANT CHANGE UP (ref 10.3–14.5)
RH IG SCN BLD-IMP: POSITIVE — SIGNIFICANT CHANGE UP
SODIUM SERPL-SCNC: 136 MMOL/L — SIGNIFICANT CHANGE UP (ref 135–145)
TROPONIN T, HIGH SENSITIVITY RESULT: 16 NG/L — SIGNIFICANT CHANGE UP (ref 0–51)
WBC # BLD: 6.99 K/UL — SIGNIFICANT CHANGE UP (ref 3.8–10.5)
WBC # FLD AUTO: 6.99 K/UL — SIGNIFICANT CHANGE UP (ref 3.8–10.5)

## 2025-07-11 PROCEDURE — 82550 ASSAY OF CK (CPK): CPT

## 2025-07-11 PROCEDURE — 84484 ASSAY OF TROPONIN QUANT: CPT

## 2025-07-11 PROCEDURE — 93010 ELECTROCARDIOGRAM REPORT: CPT | Mod: 76

## 2025-07-11 PROCEDURE — 36415 COLL VENOUS BLD VENIPUNCTURE: CPT

## 2025-07-11 PROCEDURE — 71045 X-RAY EXAM CHEST 1 VIEW: CPT | Mod: 26

## 2025-07-11 PROCEDURE — 80076 HEPATIC FUNCTION PANEL: CPT

## 2025-07-11 PROCEDURE — 85730 THROMBOPLASTIN TIME PARTIAL: CPT

## 2025-07-11 PROCEDURE — 99285 EMERGENCY DEPT VISIT HI MDM: CPT

## 2025-07-11 PROCEDURE — 82553 CREATINE MB FRACTION: CPT

## 2025-07-11 PROCEDURE — 80048 BASIC METABOLIC PNL TOTAL CA: CPT

## 2025-07-11 PROCEDURE — 83036 HEMOGLOBIN GLYCOSYLATED A1C: CPT

## 2025-07-11 PROCEDURE — 85610 PROTHROMBIN TIME: CPT

## 2025-07-11 PROCEDURE — 85025 COMPLETE CBC W/AUTO DIFF WBC: CPT

## 2025-07-11 PROCEDURE — 80061 LIPID PANEL: CPT

## 2025-07-11 PROCEDURE — 93458 L HRT ARTERY/VENTRICLE ANGIO: CPT | Mod: 26,59

## 2025-07-11 PROCEDURE — 0913T PRQ TCAT THER RX NTRAC BALO1: CPT

## 2025-07-11 PROCEDURE — 99152 MOD SED SAME PHYS/QHP 5/>YRS: CPT

## 2025-07-11 RX ORDER — INDOMETHACIN 50 MG
1 CAPSULE ORAL
Refills: 0 | DISCHARGE

## 2025-07-11 RX ORDER — PRAMIPEXOLE DIHYDROCHLORIDE 1 MG/1
1 TABLET ORAL
Refills: 0 | DISCHARGE

## 2025-07-11 RX ORDER — AMITRIPTYLINE HYDROCHLORIDE 25 MG/1
1 TABLET, FILM COATED ORAL
Refills: 0 | DISCHARGE

## 2025-07-11 RX ORDER — BUSPIRONE HYDROCHLORIDE 15 MG/1
10 TABLET ORAL
Refills: 0 | Status: DISCONTINUED | OUTPATIENT
Start: 2025-07-11 | End: 2025-07-12

## 2025-07-11 RX ORDER — BUSPIRONE HYDROCHLORIDE 15 MG/1
1 TABLET ORAL
Refills: 0 | DISCHARGE

## 2025-07-11 RX ORDER — HYDROCHLOROTHIAZIDE 50 MG/1
1 TABLET ORAL
Refills: 0 | DISCHARGE

## 2025-07-11 RX ORDER — LORAZEPAM 4 MG/ML
2 VIAL (ML) INJECTION AT BEDTIME
Refills: 0 | Status: DISCONTINUED | OUTPATIENT
Start: 2025-07-11 | End: 2025-07-12

## 2025-07-11 RX ORDER — SPIRONOLACTONE 25 MG
1 TABLET ORAL
Refills: 0 | DISCHARGE

## 2025-07-11 RX ORDER — LAMOTRIGINE 150 MG/1
25 TABLET ORAL AT BEDTIME
Refills: 0 | Status: DISCONTINUED | OUTPATIENT
Start: 2025-07-11 | End: 2025-07-12

## 2025-07-11 RX ORDER — CLOPIDOGREL BISULFATE 75 MG/1
600 TABLET, FILM COATED ORAL ONCE
Refills: 0 | Status: COMPLETED | OUTPATIENT
Start: 2025-07-11 | End: 2025-07-11

## 2025-07-11 RX ORDER — LAMOTRIGINE 150 MG/1
1 TABLET ORAL
Refills: 0 | DISCHARGE

## 2025-07-11 RX ORDER — ACETAMINOPHEN 500 MG/5ML
650 LIQUID (ML) ORAL ONCE
Refills: 0 | Status: COMPLETED | OUTPATIENT
Start: 2025-07-11 | End: 2025-07-11

## 2025-07-11 RX ORDER — LORAZEPAM 4 MG/ML
1 VIAL (ML) INJECTION
Refills: 0 | DISCHARGE

## 2025-07-11 RX ORDER — RIMEGEPANT SULFATE 75 MG/75MG
1 TABLET, ORALLY DISINTEGRATING ORAL
Refills: 0 | DISCHARGE

## 2025-07-11 RX ORDER — TRAMADOL HYDROCHLORIDE 50 MG/1
50 TABLET, FILM COATED ORAL AT BEDTIME
Refills: 0 | Status: DISCONTINUED | OUTPATIENT
Start: 2025-07-11 | End: 2025-07-12

## 2025-07-11 RX ORDER — ASPIRIN 325 MG
81 TABLET ORAL DAILY
Refills: 0 | Status: DISCONTINUED | OUTPATIENT
Start: 2025-07-12 | End: 2025-07-12

## 2025-07-11 RX ORDER — EVOLOCUMAB 140 MG/ML
140 INJECTION, SOLUTION SUBCUTANEOUS
Refills: 0 | DISCHARGE

## 2025-07-11 RX ORDER — AMITRIPTYLINE HYDROCHLORIDE 25 MG/1
25 TABLET, FILM COATED ORAL DAILY
Refills: 0 | Status: DISCONTINUED | OUTPATIENT
Start: 2025-07-11 | End: 2025-07-12

## 2025-07-11 RX ORDER — ASPIRIN 325 MG
325 TABLET ORAL ONCE
Refills: 0 | Status: COMPLETED | OUTPATIENT
Start: 2025-07-11 | End: 2025-07-11

## 2025-07-11 RX ORDER — PROPRANOLOL HCL 60 MG
1 TABLET ORAL
Refills: 0 | DISCHARGE

## 2025-07-11 RX ORDER — TELMISARTAN 20 MG/1
1 TABLET ORAL
Refills: 0 | DISCHARGE

## 2025-07-11 RX ORDER — LOSARTAN POTASSIUM 100 MG/1
100 TABLET, FILM COATED ORAL DAILY
Refills: 0 | Status: DISCONTINUED | OUTPATIENT
Start: 2025-07-12 | End: 2025-07-12

## 2025-07-11 RX ORDER — CLOPIDOGREL BISULFATE 75 MG/1
75 TABLET, FILM COATED ORAL DAILY
Refills: 0 | Status: DISCONTINUED | OUTPATIENT
Start: 2025-07-12 | End: 2025-07-12

## 2025-07-11 RX ORDER — TRAMADOL HYDROCHLORIDE 50 MG/1
1 TABLET, FILM COATED ORAL
Refills: 0 | DISCHARGE

## 2025-07-11 RX ADMIN — Medication 1000 MILLILITER(S): at 15:50

## 2025-07-11 RX ADMIN — Medication 325 MILLIGRAM(S): at 15:18

## 2025-07-11 RX ADMIN — Medication 2 MILLIGRAM(S): at 22:25

## 2025-07-11 RX ADMIN — BUSPIRONE HYDROCHLORIDE 10 MILLIGRAM(S): 15 TABLET ORAL at 22:26

## 2025-07-11 RX ADMIN — CLOPIDOGREL BISULFATE 600 MILLIGRAM(S): 75 TABLET, FILM COATED ORAL at 15:18

## 2025-07-11 RX ADMIN — Medication 650 MILLIGRAM(S): at 23:03

## 2025-07-11 RX ADMIN — LAMOTRIGINE 25 MILLIGRAM(S): 150 TABLET ORAL at 22:25

## 2025-07-11 RX ADMIN — AMITRIPTYLINE HYDROCHLORIDE 25 MILLIGRAM(S): 25 TABLET, FILM COATED ORAL at 22:25

## 2025-07-11 RX ADMIN — Medication 190 MILLILITER(S): at 18:33

## 2025-07-11 NOTE — H&P ADULT - NSCORESITESY/N_GEN_A_CORE_RD
Final Anesthesia Post-op Assessment    Patient: Deana HERNANDEZ Emma  Procedure(s) Performed: LEFT ILIOFEMORAL THROMBOEMBOLECTOMY, LEFT LOWER EXTREMITY ANGIOGRAM, AND LEFT CALF FOUR-COMPARTMENT FASCIOTOMY  Anesthesia type: General    Vitals Value Taken Time   Temp 36 °C (96.8 °F) 04/26/21 0516   Pulse 102 04/26/21 0516   Resp 17 04/26/21 0516   SpO2 100 % 04/26/21 0516   /62 04/26/21 0516         Patient Location: ICU  Post-op Vital Signs:stable  Level of Consciousness: awake and alert  Respiratory Status: spontaneous ventilation and nasal cannula  Cardiovascular stable  Hydration: euvolemic  Pain Management: adequately controlled  Handoff: Handoff to receiving nurse was performed and questions were answered  Vomiting: none   Nausea: None  Airway Patency:patent  Post-op Assessment: no complications and patient tolerated procedure well with no complications      No complications documented.   
No

## 2025-07-11 NOTE — DISCHARGE NOTE PROVIDER - NSDCMRMEDTOKEN_GEN_ALL_CORE_FT
amitriptyline 25 mg oral tablet: 1 tab(s) orally once a day  Ativan 2 mg oral tablet: 1 tab(s) orally once a day (at bedtime)  busPIRone 10 mg oral tablet: 1 tab(s) orally once a day  Emgality Autoinjector 120 mg/mL subcutaneous solution: 120 milligram(s) subcutaneously  hydroCHLOROthiazide 25 mg oral tablet: 1 tab(s) orally once a day  Inderal 40 mg oral tablet: 1 tab(s) orally 3 times a day  lamoTRIgine 25 mg oral tablet: 1 tab(s) orally 3 times a day  Nurtec ODT 75 mg oral tablet, disintegratin tab(s) orally  Repatha SureClick Autoinjector 140 mg/mL subcutaneous solution: 140 milligram(s) subcutaneously  telmisartan 80 mg oral tablet: 1 tab(s) orally once a day  traMADol 50 mg oral tablet: 1 tab(s) orally once a day (at bedtime)   amitriptyline 25 mg oral tablet: 1 tab(s) orally once a day  aspirin 81 mg oral delayed release tablet: 1 tab(s) orally once a day  Ativan 2 mg oral tablet: 1 tab(s) orally once a day (at bedtime)  busPIRone 10 mg oral tablet: 1 tab(s) orally once a day  Cardiac rehabilitation: 3 times a week for 12 weeks. Dx CAD s/p PCI. Outpatient cardiologist Dr Justin Burton  clopidogrel 75 mg oral tablet: 1 tab(s) orally once a day  Emgality Autoinjector 120 mg/mL subcutaneous solution: 120 milligram(s) subcutaneously  hydroCHLOROthiazide 25 mg oral tablet: 1 tab(s) orally once a day  Inderal 40 mg oral tablet: 1 tab(s) orally 3 times a day  lamoTRIgine 25 mg oral tablet: 1 tab(s) orally 3 times a day  Nurtec ODT 75 mg oral tablet, disintegratin tab(s) orally  Repatha SureClick Autoinjector 140 mg/mL subcutaneous solution: 140 milligram(s) subcutaneously  telmisartan 80 mg oral tablet: 1 tab(s) orally once a day  traMADol 50 mg oral tablet: 1 tab(s) orally once a day (at bedtime)

## 2025-07-11 NOTE — DISCHARGE NOTE PROVIDER - NSDCCPCAREPLAN_GEN_ALL_CORE_FT
PRINCIPAL DISCHARGE DIAGNOSIS  Diagnosis: Unstable angina  Assessment and Plan of Treatment: You were found to have blockages in the arteries of your heart, also known as Coronary Artery Disease. You underwent a cardiac catheterization on 7/11/25 and received a drug coated balloon to the diagonal branch.  - PLEASE CONTINUE ASPIRIN 81MG DAILY AND PLAVIX 75MG DAILY. DO NOT STOP THESE MEDICATIONS FOR ANY REASON AS THEY ARE KEEPING YOUR STENT OPEN AND PREVENTING A HEART ATTACK.   - Avoid strenuous activity or heavy lifting anything more than 5lbs for the next five days. Do not take a bath or swim for the next five days; you may shower. For any bleeding or hematoma formation (hardened blood collection under the skin) at the access site of your right wrist please hold pressure and go to the emergency room.   - Please follow up with Dr. Burton in 1-2 weeks. For recurrent chest pain, please call your doctor or go to the emergency room.  ** We have provided you with a prescription for cardiac rehab which is medically supervised exercise program for your heart and has been shown to improve the quantity and quality of life of people with heart disease like yours. You should attend cardiac rehab 3 times per week for 12 weeks. We have provided you with a list of nearby facilities. Please call your insurance carrier to determine which of these facilities are covered under your plan. Please bring this prescription with you to your follow up appointment with your cardiologist who can then further assist you to enroll into a cardiac rehab program.      SECONDARY DISCHARGE DIAGNOSES  Diagnosis: HLD (hyperlipidemia)  Assessment and Plan of Treatment: Please continue your Repatha injections every 2 weeks.    Diagnosis: HTN (hypertension)  Assessment and Plan of Treatment: Please continue Telmisartan 80mg daily and HCTZ 25mg daily as listed to keep your blood pressure controlled. For blood pressure that is too high or too low please see your doctor or go to the emergency room as necessary.    Diagnosis: Migraine-cluster headache syndrome  Assessment and Plan of Treatment: Please continue your migraine medications.     PRINCIPAL DISCHARGE DIAGNOSIS  Diagnosis: Unstable angina  Assessment and Plan of Treatment: You were found to have blockages in the arteries of your heart, also known as Coronary Artery Disease. You underwent a cardiac catheterization on 7/11/25 and received a drug coated balloon to the First Diagonal branch.  PLEASE CONTINUE ASPIRIN 81MG DAILY AND PLAVIX 75MG DAILY. DO NOT STOP THESE MEDICATIONS WITHOUT DISCUSSION WITH YOUR CARDIOLOGIST AS THEY ARE KEEPING YOUR ARTERY OPEN AND PREVENTING A HEART ATTACK.   - Avoid strenuous activity or heavy lifting anything more than 5lbs for the next five days. Do not take a bath or swim for the next five days; you may shower. For any bleeding or hematoma formation (hardened blood collection under the skin) at the access site of your right wrist please hold pressure and go to the emergency room.   - Please follow up with Dr. Burton in 1-2 weeks. For recurrent chest pain, please call your doctor or go to the emergency room.  ** We have provided you with a prescription for cardiac rehab which is medically supervised exercise program for your heart and has been shown to improve the quantity and quality of life of people with heart disease like yours. You should attend cardiac rehab 3 times per week for 12 weeks. We have provided you with a list of nearby facilities. Please call your insurance carrier to determine which of these facilities are covered under your plan. Please bring this prescription with you to your follow up appointment with your cardiologist who can then further assist you to enroll into a cardiac rehab program.      SECONDARY DISCHARGE DIAGNOSES  Diagnosis: HLD (hyperlipidemia)  Assessment and Plan of Treatment: Please continue your Repatha injections every 2 weeks.    Diagnosis: Migraine-cluster headache syndrome  Assessment and Plan of Treatment: Please continue your migraine medications.    Diagnosis: HTN (hypertension)  Assessment and Plan of Treatment: Please continue Telmisartan 80mg daily and HCTZ 25mg daily as listed to keep your blood pressure controlled. For blood pressure that is too high or too low please see your doctor or go to the emergency room as necessary.

## 2025-07-11 NOTE — ED PROVIDER NOTE - PHYSICAL EXAMINATION
CONSTITUTIONAL:  Well appearing, well nourished, awake, alert, oriented to person, place, time/situation and in no apparent distress.  EYES:  Clear bilaterally, pupils equal, round and reactive to light, approximately 4-6 mm bilateral pupils size.  CARDIAC:  Normal rate, regular rhythm.  Heart sounds S1, S2.  No murmurs, rubs or gallops.  RESPIRATORY:  Lungs are clear bilaterally with good aeration.  No respiratory distress.   GASTROENTEROLOGY:  Belly is soft and non-tender in all four quadrants.    MUSCULOSKELETAL:  Spine appears normal, range of motion is not limited, no muscle or joint tenderness.  No extremity edema, tenderness, or evidence of trauma.  Bilateral radial, femoral, popliteal, dorsalis pedis, and posterior tibial pulses present, strong, 2+.  Cap refill is <2 seconds in all fingers and toes bilaterally.  All compartments soft and non-tender.  NEUROLOGICAL:  Patient is alert, oriented x person, place and time.  Cranial nerves 2-12 are intact.  Normal gait and speech.    SKIN:  Spine appears normal, range of motion is not limited, no muscle or joint tenderness

## 2025-07-11 NOTE — H&P ADULT - HISTORY OF PRESENT ILLNESS
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 Pt reports while at dinner yesterday evening he had sudden LSCP radiating to L shoulder that self subsided after a few minutes and briefly returned 5 minutes after. He attributed to a likely a political discussion he had at dinner and states that it was different than his usual GERD discomfort. Today while at the gym he began to experience severe chest pressure and subsequently called his cardiologist Dr. Burton and went for a CCTA which revealed significant Diagonal dz. Currently in ED he continues to have mild chest pressure. He denies any palpitations, diaphoresis, fatigue, WEBER, orthopnea, PND, LE edema, lightheadedness, dizziness, syncope, N/V/D, abd pain, melena, BRBPR, cough, congestion, fever, chills or recent sick contact.     ED Course  VS: /68, HR 61bpm,T 97.9F, RR 17, SpO2 98% RA  Labs: hsTrop T 16, CK/CKMB negative, Cr 1.38, BUN 17, H/H wnl  EKG: NSR, non ischemic  Intervention: ASA 325mg x 1 and Plavix 600mg x1    Pt now admitted to cardiac tele for management of unstable angina and cardiac catheterization. 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 Pt reports while at dinner yesterday evening he had sudden LSCP radiating to L shoulder that self subsided after a few minutes and briefly returned 5 minutes after. He attributed to a likely a political discussion he had at dinner and states that it was different than his usual GERD discomfort. Today while at the gym he began to experience severe chest pressure and subsequently called his cardiologist Dr. Burton and went for a CCTA which revealed significant Diagonal dz. Currently in ED he continues to have mild chest pressure. He denies any palpitations, diaphoresis, fatigue, WEBER, orthopnea, PND, LE edema, lightheadedness, dizziness, syncope, N/V/D, abd pain, melena, BRBPR, cough, congestion, fever, chills or recent sick contact.     ED Course  VS: /68, HR 61bpm,T 97.9F, RR 17, SpO2 98% RA  Labs: hsTrop T 16, CK/CKMB negative, Cr 1.38, BUN 17, H/H wnl  EKG: NSR, non ischemic  Intervention: ASA 325mg x 1 and Plavix 600mg x1    Pt now admitted to cardiac tele for management of unstable angina and cardiac catheterization.

## 2025-07-11 NOTE — DISCHARGE NOTE PROVIDER - CARE PROVIDER_API CALL
Justin Burton  Cardiovascular Disease  [address]  [place] [address]  Phone: [phone]  Fax: [phone]  Established Patient  Follow Up Time: 1 week

## 2025-07-11 NOTE — H&P ADULT - NSHPLABSRESULTS_GEN_ALL_CORE
13.7   6.99  )-----------( 149      ( 11 Jul 2025 15:38 )             42.2       07-11    136  |  101  |  17  ----------------------------<  96  4.2   |  25  |  1.38[H]    Ca    9.4      11 Jul 2025 15:38        PT/INR - ( 11 Jul 2025 15:57 )   PT: 11.0 sec;   INR: 0.96          PTT - ( 11 Jul 2025 15:57 )  PTT:27.0 sec    CARDIAC MARKERS ( 11 Jul 2025 15:38 )  x     / x     / x     / x     / 1.5 ng/mL        Urinalysis Basic - ( 11 Jul 2025 15:38 )    Color: x / Appearance: x / SG: x / pH: x  Gluc: 96 mg/dL / Ketone: x  / Bili: x / Urobili: x   Blood: x / Protein: x / Nitrite: x   Leuk Esterase: x / RBC: x / WBC x   Sq Epi: x / Non Sq Epi: x / Bacteria: x        EKG: NSR, non ischemic

## 2025-07-11 NOTE — ED PROVIDER NOTE - CLINICAL SUMMARY MEDICAL DECISION MAKING FREE TEXT BOX
Presentation concerning for acute unstable angina.  Currently cp free.  Plan ekg, labs, load with asa, plavix and admit for cath.  Do not suspect pe, dissection or aaa given clinical picture.

## 2025-07-11 NOTE — DISCHARGE NOTE PROVIDER - HOSPITAL COURSE
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 Pt reports while at dinner yesterday evening he had sudden LSCP radiating to L shoulder that self subsided after a few minutes and briefly returned 5 minutes after. He attributed to a likely a political discussion he had at dinner and states that it was different than his usual GERD discomfort. Today while at the gym he began to experience severe chest pressure and subsequently called his cardiologist Dr. Burton and went for a CCTA which revealed significant Diagonal dz. He denies any palpitations, diaphoresis, fatigue, WEBER, orthopnea, PND, LE edema, lightheadedness, dizziness, syncope, N/V/D, abd pain, melena, BRBPR, cough, congestion, fever, chills or recent sick contact.     In ED, VSS, hsTrop T 16, CK/CKMB negative, Cr 1.38. EKG: NSR, non ischemic. Pt was loaded w/ ASA 325mg and Plavix 600mg and admitted to cardiac tele for management of unstable angina. Pt now s/p cardiac cath 7/11/25: IVUS guided DCB oD1 (70-80%); LM normal, mLAD 30%, LCx mild, RCA mild, access RRA.    Pt seen and examined at bedside this AM without any complaints or events overnight, VSS, labs and telemetry reviewed and pt stable for discharge as discussed with Dr. Esquivel. Pt has received appropriate discharge instructions, including medication regimen, access site management and follow up with Dr. Burton in 1-2 weeks.    Discharge medications: ASA 81mg QD, Plavix 75mg QD, ______________.     Cardiac Rehab (Post PCI): Education on benefits of Cardiac Rehab provided to patient: Yes, Referral and Prescription Given for Cardiac Rehab: Yes, Pt given list of locations & instructed to contact their insurance company to review list of participating providers.    GLP-1 receptor agonist/SGLT2 inhibitor meds discussed w/ patients and encouraged to discuss further with PMD or Endocrinologist at next visit.     Pt discharge copies detail cardiovascular history, medications, testing/treatments, OR patient has created a patient portal account and instructed to provide their records at their 1st appointment. 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 ED c/o CP and abnormal CCTA. Pt reports while at dinner yesterday evening he had sudden LSCP radiating to L shoulder that self subsided after a few minutes and briefly returned 5 minutes after. He attributed to a likely a political discussion he had at dinner and states that it was different than his usual GERD discomfort. Today while at the gym he began to experience severe chest pressure and subsequently called his cardiologist Dr. Burton and went for a CCTA which revealed significant Diagonal dz.     In ED, VSS, hsTrop T 16, CK/CKMB negative, Cr 1.38. EKG: NSR, non ischemic. Pt was loaded w/ ASA 325mg and Plavix 600mg and admitted to cardiac tele for management of unstable angina. Pt now s/p cardiac cath 7/11/25: IVUS guided DCB oD1 (70-80%); LM normal, mLAD 30%, LCx mild, RCA mild, access RRA.    Pt seen and examined at bedside this AM without any complaints or events overnight, VSS, labs and telemetry reviewed and pt stable for discharge as discussed with Dr. Esquivel. Pt has received appropriate discharge instructions, including medication regimen, access site management and follow up with Dr. Burton in 1-2 weeks.    Discharge medications: ASA 81mg QD, Plavix 75mg QD, ______________.     Cardiac Rehab (Post PCI): Education on benefits of Cardiac Rehab provided to patient: Yes, Referral and Prescription Given for Cardiac Rehab: Yes, Pt given list of locations & instructed to contact their insurance company to review list of participating providers.    GLP-1 receptor agonist/SGLT2 inhibitor meds discussed w/ patients and encouraged to discuss further with PMD or Endocrinologist at next visit.     Pt discharge copies detail cardiovascular history, medications, testing/treatments, OR patient has created a patient portal account and instructed to provide their records at their 1st appointment.    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 Pt reports while at dinner he had sudden LSCP radiating to L shoulder that self subsided after a few minutes and briefly returned 5 minutes after. He attributed to a likely a political discussion he had at dinner and states that it was different than his usual GERD discomfort.  While at the gym he began to experience severe chest pressure and subsequently called his cardiologist Dr. Burton and went for a CCTA which revealed significant Diagonal disease.    In ED, VSS, neg hsTrop T 16, CK/CKMB negative, Cr 1.38. EKG: NSR, non ischemic. Pt was loaded w/ ASA 325mg and Plavix 600mg and admitted to cardiac tele for management of unstable angina. Pt now s/p cardiac cath 7/11/25: IVUS guided DCB oD1 (70-80%); LM normal, mLAD 30%, LCx mild, RCA mild, access RRA. Pt will c/w ASA/Plavix x 1 month, then transition to single antiplatelet afterwards as outpatient.      Pt admitted overnight for observation and telemetry monitoring. Pt seen and examined at bedside on day of discharge without any complaints or events overnight. VSS, labs and telemetry reviewed and pt stable for discharge. Pt has received appropriate discharge instructions, including medication regimen, access site management and follow up with cardiologist Dr. LINDSEY Burton in 1-2 weeks.    Referred for Cardiac Rehab (CAD Post PCI): Education on benefits of Cardiac Rehab provided to patient. Referral and Prescription Given for Cardiac Rehab. Pt given list of locations & instructed to contact their insurance company to review list of participating providers. Pt instructed to bring Cardiac Rehab prescription with them to Cardiology Follow up appointment for assistance with enrollment.  GLP-1 receptor agonist/SGLT2 inhibitor meds discussed w/ patients and encouraged to discuss further with PMD or Endocrinologist at next visit.    Pt discharge copies detail cardiovascular history, medications, testing/treatments, OR patient has created a patient portal account and instructed to provide their records at their 1st appointment.

## 2025-07-11 NOTE — PATIENT PROFILE ADULT - FALL HARM RISK - HARM RISK INTERVENTIONS

## 2025-07-11 NOTE — ED PROVIDER NOTE - DISPOSITION TYPE
5/1/2025      Izaiah Liriano  [address]  [facility] [address]      Dear Colleague,    Thank you for referring your patient, Izaiah Liriano, to the [facility]. Please see a copy of my visit note below.    SUBJECTIVE:                                                                   Izaiah Liriano presents today to our Allergy Clinic at [facility] for a follow up visit. He is a 2 year old male with allergic rhinitis and possible asthma.  The mother accompanies the patient and helps to provide history.     In June 2024, the mother mentioned persistent cough associated with nasal congestion and rhinorrhea.  In June 2024, positive serum IgE for dog.  Also mild sensitivity to molds.  In November 2024, he started having a persistent wet cough.  Despite treatment with cetirizine and intranasal fluticasone to cough persistent.  It was getting worse with physical activity and while being outside.    The patient has been using azelastine nasal spray twice daily and Flonase Sensimist once daily, with good symptom control. The family denies uncontrolled nasal congestion, rhinorrhea, or sneezing.    He previously started on Qvar 44 mcg, 2 puffs twice daily, which led to resolution of his cough. He had no issues with exertion while on that dose. However, due to an insurance change, he was switched to Qvar 40 mcg last month. Since then, he has developed a more phlegmy cough, which began a few days ago. The mother is unsure whether this is coincidental or related to the change in medication.    Despite the cough, he continues to breathe comfortably during physical activity and does not wake up at night due to coughing.  They do not really need to give him albuterol for this cough, because it is sporadic.    The mother was able to speak with the pharmacy and reactivate the Flovent prescription, which she picked up today.        Patient Active Problem List   Diagnosis      [place]       Past Medical History:   Diagnosis Date     Egg allergy 2022    Facial urticaria at 8 months of age.  SPT on 2022 was positive, 12/27 mm. In , Egg white IgE 0.65 KU/L, ovomucoid <0.1 KU/L, ovalbumin 0.71 KU/L. Tolerated baked egg challenge in 2022.       Egg allergy 2022    Facial urticaria at 8 months of age.  SPT on 2022 was positive, 12/27 mm. In , Egg white IgE 0.65 KU/L, ovomucoid <0.1 KU/L, ovalbumin 0.71 KU/L. Tolerated baked egg challenge in 2022.        No data available          History reviewed. No pertinent surgical history.  Social History     Socioeconomic History     Marital status: Single     Spouse name: None     Number of children: None     Years of education: None     Highest education level: None   Occupational History     Occupation: Child   Tobacco Use     Smoking status: Never     Passive exposure: Never   Vaping Use     Vaping status: Never Used     Passive vaping exposure: Yes   Social History Narrative    24        ENVIRONMENTAL HISTORY: [place] The home is heated with a forced air. They does have central air conditioning. The patient's bedroom is furnished with stuffed animals in bed, carpeting in bedroom, and fabric window coverings.  Pets inside the house include 2 dog(s). There is no history of cockroach or mice infestation. There is/are 1 smokers  vapes in the house.  The house does not have a damp basement.              Social Drivers of Health      Received [facility] [employer] [employer]    Financial Resource Strain             Current Outpatient Medications:      albuterol (PROAIR HFA/PROVENTIL HFA/VENTOLIN HFA) 108 (90 Base) MCG/ACT inhaler, Inhale 2-4 puffs into the lungs every 4 hours as needed for shortness of breath or wheezing., Disp: 18 g, Rfl: 3     albuterol  "(PROVENTIL) (2.5 MG/3ML) 0.083% neb solution, Take 1 vial (2.5 mg) by nebulization every 6 hours as needed for shortness of breath, wheezing or cough., Disp: 90 mL, Rfl: 3     azelastine (ASTELIN) 0.1 % nasal spray, Spray 1 spray into both nostrils 2 times daily as needed for rhinitis., Disp: 30 mL, Rfl: 3     beclomethasone HFA (QVAR REDIHALER) 40 MCG/ACT inhaler, Inhale 2 puffs into the lungs 2 times daily., Disp: 10.6 g, Rfl: 4     fluticasone (FLONASE) 50 MCG/ACT nasal spray, Spray 1 spray into both nostrils daily., Disp: 16 g, Rfl: 3     Respiratory Therapy Supplies (NEBULIZER/TUBING/MOUTHPIECE) KIT, Use with albuterol neb solution, Disp: 1 kit, Rfl: 0     cetirizine (ZYRTEC) 5 MG/5ML solution, Take 2.5 mg by mouth daily. (Patient not taking: Reported on 5/1/2025), Disp: , Rfl:      fluticasone (FLOVENT HFA) 44 MCG/ACT inhaler, Inhale 2 puffs into the lungs 2 times daily. (Patient not taking: Reported on 5/1/2025), Disp: 10.6 g, Rfl: 3  Immunization History   Administered Date(s) Administered     DTAP (<7y) 11/17/2022     DTaP/HepB/IPV 2021, 2021, 01/20/2022     HIB(PRP-OMP)(PedvaxHIB) 2021, 2021, 11/17/2022     Hepatitis A (Vaqta/Havrix)(Peds 12m-18y) 08/04/2022     Hepatitis B, Peds (Engerix-B/Recombivax HB) 2021     MMR (MMRII) 08/04/2022     Pneumo Conj 13-V (2010&after) 2021, 2021, 01/20/2022, 08/04/2022     Rotavirus, monovalent, 2-dose 2021, 2021     Varicella (Varivax) 08/04/2022     No Known Allergies  OBJECTIVE:                                                                 BP 90/52 (BP Location: Left arm, Patient Position: Sitting, Cuff Size: Child)   Pulse 86   Temp 97.5  F (36.4  C) (Tympanic)   Ht 1.029 m (3' 4.5\")   Wt 18.4 kg (40 lb 9.6 oz)   SpO2 100%   BMI 17.40 kg/m          Physical Exam  Vitals and nursing note reviewed.   Constitutional:       General: He is not in acute distress.     Appearance: He is not diaphoretic.   HENT:    "   Head: Normocephalic and atraumatic.      Right Ear: Tympanic membrane, ear canal and external ear normal.      Left Ear: Tympanic membrane, ear canal and external ear normal.      Nose: No mucosal edema or rhinorrhea.      Right Turbinates: Not enlarged.      Left Turbinates: Not enlarged.      Mouth/Throat:      Mouth: Mucous membranes are moist.      Pharynx: Oropharynx is clear.   Eyes:      General:         Right eye: No discharge.         Left eye: No discharge.      Conjunctiva/sclera: Conjunctivae normal.   Cardiovascular:      Rate and Rhythm: Normal rate and regular rhythm.      Heart sounds: No murmur heard.  Pulmonary:      Effort: Pulmonary effort is normal. No respiratory distress.      Breath sounds: Normal breath sounds. No wheezing or rales.   Musculoskeletal:         General: Normal range of motion.   Neurological:      Mental Status: He is alert and oriented for age.              ASSESSMENT/PLAN:           Other cough  It is unclear whether the return of cough is coincidental or related to the switch from Flovent 44 mcg to Qvar RediHaler 40 mcg, a breath-actuated inhaler. The current cough is milder than previous episodes.    - Today, I demonstrated how to remove the top of the Qvar RediHaler so that the canister can be used with a spacer as a standard HFA inhaler, bypassing breath actuation. They will trial this method to see if symptom control improves.    - If symptoms do not improve, they will switch back to Flovent 44 mcg, 2 puffs twice daily, which the mother already has available.    - Continue albuterol as needed for breakthrough symptoms.      Allergic rhinitis due to animals  Seems to be well-controlled with a combination of intranasal fluticasone and azelastine.  - Continue without changes.         Follow up in 3 months or sooner if needed.    Thank you for allowing us to participate in the care of this patient. Please feel free to contact us if there are any questions or concerns  about the patient.    Disclaimer: This note consists of symbols derived from keyboarding, dictation and/or voice recognition software. As a result, there may be errors in the script that have gone undetected. Please consider this when interpreting information found in this chart.    Consent was obtained from the patient to use an AI documentation tool in the creation of this note.     Vinayak Murray MD, FAAAAI, FACAAI  Allergy, Asthma and Immunology     [facility]        Again, thank you for allowing me to participate in the care of your patient.        Sincerely,        Vinayak Murray MD    Electronically signed ADMIT

## 2025-07-11 NOTE — H&P ADULT - PATIENT'S PREFERRED PRONOUN
Med rec completed for hospital discharge s/p  at 40+1 wga c/b 3rd degree perineal laceration     Nexplanon inserted prior to discharge by OB team on 20  SN: [id number]  EXP: 2022  Lot: [id number]    
Him/He

## 2025-07-11 NOTE — H&P ADULT - PROBLEM SELECTOR PLAN 4
-c/w propranolol 40mg TID, Amitriptyline 25mg QD and Lamotrigine 30mg     F: None  E: Replete if K<4 or Mag<2  N: regular Diet  VTEppx: none 2/2 cath  CODE: full  Dispo: cardiac tele -c/w propranolol 40mg TID, Amitriptyline 25mg QD and Lamotrigine 30mg QD    F: 1L NS bolus  E: Replete if K<4 or Mag<2  N: regular Diet  VTEppx: none 2/2 cath  CODE: full  Dispo: cardiac tele

## 2025-07-11 NOTE — ED PROVIDER NOTE - OBJECTIVE STATEMENT
71 yo healthy M sent to ED by cardiologist for evaluation of CP present today and yesterday intermittent L sided nonradiating worsened with exertion.  Outpt CCTA today noted sig CAD.  Pt currently cp free.

## 2025-07-11 NOTE — ED ADULT TRIAGE NOTE - CHIEF COMPLAINT QUOTE
"I started having chest pain last night and it happened twice and the second time it happened it felt like it was moving a bit to my left arm and I have it now". Patient denies sob, dizziness, weakness.

## 2025-07-11 NOTE — H&P ADULT - ASSESSMENT
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

## 2025-07-11 NOTE — ED ADULT NURSE NOTE - OBJECTIVE STATEMENT
70yM presents to the ED with reports of chest pain. pt reports yesterday x2 episodes of L sided chest discomfort, 2nd episode had radiation to L shoulder. pt reports another episode today when at the gym with exertion. states had a CT coronary performed today and told he had an occlusion and to come to ED for the cath lab. A&Ox4. Breathing spontaneously and unlabored on room air. reports chest pain currently on arrival to ED. did not take aspirin prior to arrival.

## 2025-07-11 NOTE — H&P ADULT - NSICDXPASTMEDICALHX_GEN_ALL_CORE_FT
PAST MEDICAL HISTORY:  Aortitis     Autoimmune disease     GCA (giant cell arteritis)     HLD (hyperlipidemia)     Leukemia     Migraine-cluster headache syndrome

## 2025-07-11 NOTE — H&P ADULT - PROBLEM SELECTOR PLAN 1
presents w/ sudden LSCP radiating to L arm/shoulder pain, now mild chest pressure  - hsTrop T 16, CK/CKMB negative  -EKG NSR no ischemic  -CCTA 7/11 revealing significant Diagonal dz  -NPO for cardiac cath today, pt consented and s/p ASA 325mg and PLavix 600mg. Will give 1L NS bolus for precath fluids i/s/o recent contrast use for CCTA  -TTE in AM presents w/ sudden LSCP radiating to L arm/shoulder pain, now mild chest pressure  - hsTrop T 16, CK/CKMB negative  -EKG NSR no ischemic  -CCTA 7/11 revealing significant Diagonal dz  -NPO for cardiac cath today, pt consented and s/p ASA 325mg and Plavix 600mg. Will give 1L NS bolus for precath fluids i/s/o recent contrast use for CCTA  -TTE in AM presents w/ sudden LSCP radiating to L arm/shoulder pain, now mild chest pressure  - hsTrop T 16, CK/CKMB negative  -EKG NSR no ischemic  -CCTA 7/11 revealing significant Diagonal dz  -NPO for cardiac cath today, pt consented and s/p ASA 325mg and Plavix 600mg. Will give 1L NS bolus for precath fluids i/s/o recent contrast use for CCTA

## 2025-07-12 ENCOUNTER — TRANSCRIPTION ENCOUNTER (OUTPATIENT)
Age: 70
End: 2025-07-12

## 2025-07-12 VITALS
SYSTOLIC BLOOD PRESSURE: 148 MMHG | DIASTOLIC BLOOD PRESSURE: 75 MMHG | HEART RATE: 65 BPM | RESPIRATION RATE: 18 BRPM | OXYGEN SATURATION: 99 %

## 2025-07-12 LAB
ANION GAP SERPL CALC-SCNC: 8 MMOL/L — SIGNIFICANT CHANGE UP (ref 5–17)
BUN SERPL-MCNC: 16 MG/DL — SIGNIFICANT CHANGE UP (ref 7–23)
CALCIUM SERPL-MCNC: 8.8 MG/DL — SIGNIFICANT CHANGE UP (ref 8.4–10.5)
CHLORIDE SERPL-SCNC: 105 MMOL/L — SIGNIFICANT CHANGE UP (ref 96–108)
CO2 SERPL-SCNC: 24 MMOL/L — SIGNIFICANT CHANGE UP (ref 22–31)
CREAT SERPL-MCNC: 1.24 MG/DL — SIGNIFICANT CHANGE UP (ref 0.5–1.3)
EGFR: 62 ML/MIN/1.73M2 — SIGNIFICANT CHANGE UP
EGFR: 62 ML/MIN/1.73M2 — SIGNIFICANT CHANGE UP
GLUCOSE SERPL-MCNC: 92 MG/DL — SIGNIFICANT CHANGE UP (ref 70–99)
HCT VFR BLD CALC: 43.1 % — SIGNIFICANT CHANGE UP (ref 39–50)
HGB BLD-MCNC: 13 G/DL — SIGNIFICANT CHANGE UP (ref 13–17)
MAGNESIUM SERPL-MCNC: 1.9 MG/DL — SIGNIFICANT CHANGE UP (ref 1.6–2.6)
MCHC RBC-ENTMCNC: 28.6 PG — SIGNIFICANT CHANGE UP (ref 27–34)
MCHC RBC-ENTMCNC: 30.2 G/DL — LOW (ref 32–36)
MCV RBC AUTO: 94.9 FL — SIGNIFICANT CHANGE UP (ref 80–100)
NRBC # BLD AUTO: 0 K/UL — SIGNIFICANT CHANGE UP (ref 0–0)
NRBC # FLD: 0 K/UL — SIGNIFICANT CHANGE UP (ref 0–0)
NRBC BLD AUTO-RTO: 0 /100 WBCS — SIGNIFICANT CHANGE UP (ref 0–0)
PLATELET # BLD AUTO: 138 K/UL — LOW (ref 150–400)
PMV BLD: 10.7 FL — SIGNIFICANT CHANGE UP (ref 7–13)
POTASSIUM SERPL-MCNC: 4.3 MMOL/L — SIGNIFICANT CHANGE UP (ref 3.5–5.3)
POTASSIUM SERPL-SCNC: 4.3 MMOL/L — SIGNIFICANT CHANGE UP (ref 3.5–5.3)
RBC # BLD: 4.54 M/UL — SIGNIFICANT CHANGE UP (ref 4.2–5.8)
RBC # FLD: 13.8 % — SIGNIFICANT CHANGE UP (ref 10.3–14.5)
SODIUM SERPL-SCNC: 137 MMOL/L — SIGNIFICANT CHANGE UP (ref 135–145)
T4 AB SER-ACNC: 4.9 UG/DL — SIGNIFICANT CHANGE UP (ref 4.5–11.7)
TSH SERPL-MCNC: 3.03 UIU/ML — SIGNIFICANT CHANGE UP (ref 0.27–4.2)
WBC # BLD: 3.97 K/UL — SIGNIFICANT CHANGE UP (ref 3.8–10.5)
WBC # FLD AUTO: 3.97 K/UL — SIGNIFICANT CHANGE UP (ref 3.8–10.5)

## 2025-07-12 PROCEDURE — 84484 ASSAY OF TROPONIN QUANT: CPT

## 2025-07-12 PROCEDURE — C1725: CPT

## 2025-07-12 PROCEDURE — 83036 HEMOGLOBIN GLYCOSYLATED A1C: CPT

## 2025-07-12 PROCEDURE — 36415 COLL VENOUS BLD VENIPUNCTURE: CPT

## 2025-07-12 PROCEDURE — 80076 HEPATIC FUNCTION PANEL: CPT

## 2025-07-12 PROCEDURE — 85025 COMPLETE CBC W/AUTO DIFF WBC: CPT

## 2025-07-12 PROCEDURE — 93005 ELECTROCARDIOGRAM TRACING: CPT

## 2025-07-12 PROCEDURE — 83735 ASSAY OF MAGNESIUM: CPT

## 2025-07-12 PROCEDURE — 71045 X-RAY EXAM CHEST 1 VIEW: CPT

## 2025-07-12 PROCEDURE — 80061 LIPID PANEL: CPT

## 2025-07-12 PROCEDURE — 84443 ASSAY THYROID STIM HORMONE: CPT

## 2025-07-12 PROCEDURE — 85610 PROTHROMBIN TIME: CPT

## 2025-07-12 PROCEDURE — 82550 ASSAY OF CK (CPK): CPT

## 2025-07-12 PROCEDURE — 86900 BLOOD TYPING SEROLOGIC ABO: CPT

## 2025-07-12 PROCEDURE — 80048 BASIC METABOLIC PNL TOTAL CA: CPT

## 2025-07-12 PROCEDURE — 86901 BLOOD TYPING SEROLOGIC RH(D): CPT

## 2025-07-12 PROCEDURE — 99285 EMERGENCY DEPT VISIT HI MDM: CPT | Mod: 25

## 2025-07-12 PROCEDURE — 82553 CREATINE MB FRACTION: CPT

## 2025-07-12 PROCEDURE — 85027 COMPLETE CBC AUTOMATED: CPT

## 2025-07-12 PROCEDURE — 93010 ELECTROCARDIOGRAM REPORT: CPT

## 2025-07-12 PROCEDURE — 85730 THROMBOPLASTIN TIME PARTIAL: CPT

## 2025-07-12 PROCEDURE — C1887: CPT

## 2025-07-12 PROCEDURE — 84436 ASSAY OF TOTAL THYROXINE: CPT

## 2025-07-12 PROCEDURE — 86850 RBC ANTIBODY SCREEN: CPT

## 2025-07-12 PROCEDURE — C9610: CPT

## 2025-07-12 PROCEDURE — C1769: CPT

## 2025-07-12 PROCEDURE — C1894: CPT

## 2025-07-12 PROCEDURE — C1753: CPT

## 2025-07-12 RX ORDER — CLOPIDOGREL BISULFATE 75 MG/1
1 TABLET, FILM COATED ORAL
Qty: 30 | Refills: 11
Start: 2025-07-12 | End: 2026-07-06

## 2025-07-12 RX ORDER — ASPIRIN 325 MG
1 TABLET ORAL
Qty: 30 | Refills: 11
Start: 2025-07-12 | End: 2026-07-06

## 2025-07-12 RX ORDER — MAGNESIUM OXIDE 400 MG
400 TABLET ORAL ONCE
Refills: 0 | Status: DISCONTINUED | OUTPATIENT
Start: 2025-07-12 | End: 2025-07-12

## 2025-07-12 RX ADMIN — CLOPIDOGREL BISULFATE 75 MILLIGRAM(S): 75 TABLET, FILM COATED ORAL at 09:41

## 2025-07-12 RX ADMIN — TRAMADOL HYDROCHLORIDE 50 MILLIGRAM(S): 50 TABLET, FILM COATED ORAL at 08:03

## 2025-07-12 RX ADMIN — TRAMADOL HYDROCHLORIDE 50 MILLIGRAM(S): 50 TABLET, FILM COATED ORAL at 06:53

## 2025-07-12 RX ADMIN — Medication 650 MILLIGRAM(S): at 00:00

## 2025-07-12 RX ADMIN — Medication 81 MILLIGRAM(S): at 09:41

## 2025-07-12 RX ADMIN — BUSPIRONE HYDROCHLORIDE 10 MILLIGRAM(S): 15 TABLET ORAL at 06:44

## 2025-07-12 NOTE — DISCHARGE NOTE NURSING/CASE MANAGEMENT/SOCIAL WORK - FINANCIAL ASSISTANCE
[facility] provides services at a reduced cost to those who are determined to be eligible through [facility]’s financial assistance program. Information regarding [facility]’s financial assistance program can be found by going to [email or web address] or by calling [phone].

## 2025-07-12 NOTE — DISCHARGE NOTE NURSING/CASE MANAGEMENT/SOCIAL WORK - PATIENT PORTAL LINK FT
You can access the FollowMyHealth Patient Portal offered by [facility] by registering at the following website: [email or web address]. By joining [employer]’s FollowMyHealth portal, you will also be able to view your health information using other applications (apps) compatible with our system.

## 2025-07-12 NOTE — DISCHARGE NOTE NURSING/CASE MANAGEMENT/SOCIAL WORK - NSDCPEFALRISK_GEN_ALL_CORE
For information on Fall & Injury Prevention, visit: [email or web address] OR  [email or web address] OR  https://www.cdc.gov/steadi/patient.html

## 2025-07-22 ENCOUNTER — RX RENEWAL (OUTPATIENT)
Age: 70
End: 2025-07-22

## 2025-07-26 DIAGNOSIS — M31.6 OTHER GIANT CELL ARTERITIS: ICD-10-CM

## 2025-07-26 DIAGNOSIS — G90.09 OTHER IDIOPATHIC PERIPHERAL AUTONOMIC NEUROPATHY: ICD-10-CM

## 2025-07-26 DIAGNOSIS — Z98.41 CATARACT EXTRACTION STATUS, RIGHT EYE: ICD-10-CM

## 2025-07-26 DIAGNOSIS — G43.809 OTHER MIGRAINE, NOT INTRACTABLE, WITHOUT STATUS MIGRAINOSUS: ICD-10-CM

## 2025-07-26 DIAGNOSIS — E78.5 HYPERLIPIDEMIA, UNSPECIFIED: ICD-10-CM

## 2025-07-26 DIAGNOSIS — Z90.49 ACQUIRED ABSENCE OF OTHER SPECIFIED PARTS OF DIGESTIVE TRACT: ICD-10-CM

## 2025-07-26 DIAGNOSIS — I25.110 ATHEROSCLEROTIC HEART DISEASE OF NATIVE CORONARY ARTERY WITH UNSTABLE ANGINA PECTORIS: ICD-10-CM

## 2025-07-26 DIAGNOSIS — I10 ESSENTIAL (PRIMARY) HYPERTENSION: ICD-10-CM

## 2025-07-26 DIAGNOSIS — Z98.61 CORONARY ANGIOPLASTY STATUS: ICD-10-CM

## 2025-07-26 DIAGNOSIS — C95.90 LEUKEMIA, UNSPECIFIED NOT HAVING ACHIEVED REMISSION: ICD-10-CM

## 2025-07-29 DIAGNOSIS — Z98.62 PERIPHERAL VASCULAR ANGIOPLASTY STATUS: ICD-10-CM

## 2025-07-29 DIAGNOSIS — I10 ESSENTIAL (PRIMARY) HYPERTENSION: ICD-10-CM

## 2025-07-31 DIAGNOSIS — I25.10 ATHEROSCLEROTIC HEART DISEASE OF NATIVE CORONARY ARTERY W/OUT ANGINA PECTORIS: ICD-10-CM

## 2025-07-31 RX ORDER — NIFEDIPINE 10 MG/1
10 CAPSULE ORAL 3 TIMES DAILY
Qty: 270 | Refills: 0 | Status: ACTIVE | COMMUNITY
Start: 2025-07-31 | End: 1900-01-01

## 2025-07-31 RX ORDER — CLOPIDOGREL BISULFATE 75 MG/1
75 TABLET, FILM COATED ORAL DAILY
Qty: 1 | Refills: 0 | Status: ACTIVE | COMMUNITY
Start: 2025-07-29 | End: 1900-01-01

## 2025-07-31 RX ORDER — TELMISARTAN 80 MG/1
80 TABLET ORAL
Qty: 90 | Refills: 0 | Status: ACTIVE | COMMUNITY
Start: 2025-07-29 | End: 1900-01-01

## 2025-08-18 ENCOUNTER — EMERGENCY (EMERGENCY)
Facility: HOSPITAL | Age: 70
LOS: 1 days | End: 2025-08-18
Attending: STUDENT IN AN ORGANIZED HEALTH CARE EDUCATION/TRAINING PROGRAM | Admitting: STUDENT IN AN ORGANIZED HEALTH CARE EDUCATION/TRAINING PROGRAM
Payer: COMMERCIAL

## 2025-08-18 VITALS
TEMPERATURE: 98 F | HEIGHT: 67 IN | SYSTOLIC BLOOD PRESSURE: 110 MMHG | OXYGEN SATURATION: 97 % | WEIGHT: 145.06 LBS | RESPIRATION RATE: 17 BRPM | HEART RATE: 67 BPM | DIASTOLIC BLOOD PRESSURE: 70 MMHG

## 2025-08-18 DIAGNOSIS — H26.9 UNSPECIFIED CATARACT: Chronic | ICD-10-CM

## 2025-08-18 DIAGNOSIS — J34.2 DEVIATED NASAL SEPTUM: Chronic | ICD-10-CM

## 2025-08-18 DIAGNOSIS — Z01.89 ENCOUNTER FOR OTHER SPECIFIED SPECIAL EXAMINATIONS: ICD-10-CM

## 2025-08-18 DIAGNOSIS — Z53.29 PROCEDURE AND TREATMENT NOT CARRIED OUT BECAUSE OF PATIENT'S DECISION FOR OTHER REASONS: ICD-10-CM

## 2025-08-18 DIAGNOSIS — M31.6 OTHER GIANT CELL ARTERITIS: ICD-10-CM

## 2025-08-18 DIAGNOSIS — G90.09 OTHER IDIOPATHIC PERIPHERAL AUTONOMIC NEUROPATHY: Chronic | ICD-10-CM

## 2025-08-18 DIAGNOSIS — Z98.890 OTHER SPECIFIED POSTPROCEDURAL STATES: Chronic | ICD-10-CM

## 2025-08-18 DIAGNOSIS — Z98.42 CATARACT EXTRACTION STATUS, LEFT EYE: Chronic | ICD-10-CM

## 2025-08-18 DIAGNOSIS — Z90.49 ACQUIRED ABSENCE OF OTHER SPECIFIED PARTS OF DIGESTIVE TRACT: Chronic | ICD-10-CM

## 2025-08-18 PROCEDURE — 99282 EMERGENCY DEPT VISIT SF MDM: CPT

## 2025-08-18 PROCEDURE — 99283 EMERGENCY DEPT VISIT LOW MDM: CPT

## 2025-08-19 PROBLEM — E78.5 HYPERLIPIDEMIA, UNSPECIFIED: Chronic | Status: ACTIVE | Noted: 2025-07-11

## 2025-08-21 ENCOUNTER — LABORATORY RESULT (OUTPATIENT)
Age: 70
End: 2025-08-21

## 2025-08-25 LAB
ALBUMIN SERPL ELPH-MCNC: 4.1 G/DL
ALP BLD-CCNC: 57 U/L
ALT SERPL-CCNC: 26 U/L
ANA SCREEN BY IMMUNOASSAY: NEGATIVE
ANION GAP SERPL CALC-SCNC: 9 MMOL/L
AST SERPL-CCNC: 30 U/L
BASOPHILS # BLD AUTO: 0.01 K/UL
BASOPHILS NFR BLD AUTO: 0.2 %
BILIRUB SERPL-MCNC: 0.4 MG/DL
BUN SERPL-MCNC: 23 MG/DL
C3 SERPL-MCNC: 104 MG/DL
C4 SERPL-MCNC: 24 MG/DL
CALCIUM SERPL-MCNC: 9.2 MG/DL
CENTROMERE B AB SER-ACNC: <0.2 AL
CHLORIDE SERPL-SCNC: 109 MMOL/L
CHOLEST SERPL-MCNC: 167 MG/DL
CHROMATIN AB SERPL-ACNC: <0.2 AL
CO2 SERPL-SCNC: 23 MMOL/L
CREAT SERPL-MCNC: 1.31 MG/DL
CRP SERPL-MCNC: <3 MG/L
DEPRECATED KAPPA LC FREE/LAMBDA SER: 2.93 RATIO
DSDNA AB SER-ACNC: <1 IU/ML
EGFRCR SERPLBLD CKD-EPI 2021: 58 ML/MIN/1.73M2
ENA JO1 AB SER-ACNC: <0.2 AL
ENA RNP AB SER-ACNC: 0.6 AL
ENA SCL70 AB SER-ACNC: 0.2 AL
ENA SM AB SER-ACNC: <0.2 AL
ENA SS-A AB SER-ACNC: <0.2 AL
ENA SS-B AB SER-ACNC: <0.2 AL
EOSINOPHIL # BLD AUTO: 0.06 K/UL
EOSINOPHIL NFR BLD AUTO: 1.1 %
ERYTHROCYTE [SEDIMENTATION RATE] IN BLOOD BY WESTERGREN METHOD: 15 MM/HR
GLUCOSE SERPL-MCNC: 147 MG/DL
HAV IGM SER QL: NONREACTIVE
HBV CORE IGM SER QL: NONREACTIVE
HBV SURFACE AG SER QL: NONREACTIVE
HCT VFR BLD CALC: 44.5 %
HCV AB SER QL: NONREACTIVE
HCV S/CO RATIO: 0.04 S/CO
HDLC SERPL-MCNC: 64 MG/DL
HGB BLD-MCNC: 14 G/DL
IGA SERPL-MCNC: 143 MG/DL
IGG SERPL-MCNC: 556 MG/DL
IGG SERPL-MCNC: 556 MG/DL
IGG1 SER-MCNC: 345 MG/DL
IGG2 SER-MCNC: 120 MG/DL
IGG3 SER-MCNC: 29.9 MG/DL
IGG4 SER-MCNC: 11.9 MG/DL
IGM SERPL-MCNC: 87 MG/DL
IMM GRANULOCYTES NFR BLD AUTO: 0.4 %
KAPPA LC CSF-MCNC: 1.2 MG/DL
KAPPA LC SERPL-MCNC: 3.52 MG/DL
LDLC SERPL-MCNC: 86 MG/DL
LUPUS ANTICOAGULANT CASCADE REFLEX: NORMAL
LYMPHOCYTES # BLD AUTO: 2.74 K/UL
LYMPHOCYTES NFR BLD AUTO: 51.4 %
M TB IFN-G BLD-IMP: NEGATIVE
MAN DIFF?: NORMAL
MCHC RBC-ENTMCNC: 29.9 PG
MCHC RBC-ENTMCNC: 31.5 G/DL
MCV RBC AUTO: 94.9 FL
MONOCYTES # BLD AUTO: 0.43 K/UL
MONOCYTES NFR BLD AUTO: 8.1 %
NEUTROPHILS # BLD AUTO: 2.07 K/UL
NEUTROPHILS NFR BLD AUTO: 38.8 %
NONHDLC SERPL-MCNC: 103 MG/DL
PLATELET # BLD AUTO: 165 K/UL
POTASSIUM SERPL-SCNC: 5.2 MMOL/L
PROT SERPL-MCNC: 5.9 G/DL
QUANTIFERON TB PLUS MITOGEN MINUS NIL: >10 IU/ML
QUANTIFERON TB PLUS NIL: 0.09 IU/ML
QUANTIFERON TB PLUS TB1 MINUS NIL: 0 IU/ML
QUANTIFERON TB PLUS TB2 MINUS NIL: 0 IU/ML
RBC # BLD: 4.69 M/UL
RBC # FLD: 17 %
RIBOSOMAL P AB SER-ACNC: <0.2 AL
SODIUM SERPL-SCNC: 141 MMOL/L
TRIGL SERPL-MCNC: 96 MG/DL
VZV AB TITR SER: POSITIVE
VZV IGG SER IF-ACNC: 17.1 S/CO
WBC # FLD AUTO: 5.33 K/UL

## 2025-08-29 LAB
AA PROT SER-MCNC: 0.64 UG/ML
ALBUMIN MFR SERPL ELPH: 65.7 %
ALBUMIN SERPL-MCNC: 3.9 G/DL
ALBUMIN/GLOB SERPL: 2 RATIO
ALPHA1 GLOB MFR SERPL ELPH: 4.6 %
ALPHA1 GLOB SERPL ELPH-MCNC: 0.3 G/DL
ALPHA2 GLOB MFR SERPL ELPH: 9.3 %
ALPHA2 GLOB SERPL ELPH-MCNC: 0.5 G/DL
ANACR T: NEGATIVE
AQP4 H2O CHANNEL IGG SERPL QL: NEGATIVE
B-GLOBULIN MFR SERPL ELPH: 10.5 %
B-GLOBULIN SERPL ELPH-MCNC: 0.6 G/DL
BIOMARKER COMMENT: NORMAL
CRP SERPL-MCNC: 0.25 MG/L
EGF SERPL-MCNC: 29 PG/ML
FOOTNOTE: NORMAL
GAD65 AB SER-MCNC: 0 NMOL/L
GAMMA GLOB FLD ELPH-MCNC: 0.6 G/DL
GAMMA GLOB MFR SERPL ELPH: 9.9 %
IL6 SERPL-MCNC: 4.3 PG/ML
INTERPRETATION SERPL IEP-IMP: NORMAL
LEPTIN SERPL-MCNC: 1 NG/ML
Lab: NORMAL
Lab: NORMAL
M PROTEIN SPEC IFE-MCNC: NORMAL
MMP-1 SERPL-MCNC: 5.3 NG/ML
MMP-3 SERPL-MCNC: 45 NG/ML
PROT SERPL-MCNC: 5.9 G/DL
PROT SERPL-MCNC: 5.9 G/DL
RA DAS LEVEL QL VECTRADA: NORMAL
RESISTIN SERPL-MCNC: 2 NG/ML
RISK OF RADIOGRAPHIC PROGRESS: 2 %
TNFRSF1A SERPL-MCNC: 0.91 NG/ML
VAP-1 SERPL-MCNC: 0.48 UG/ML
VECTRA SCORE: 21
VEGF-A SERPL-MCNC: 110 PG/ML
YKL-40 RESULT: 21 NG/ML

## 2025-09-11 LAB
ALBUMIN SERPL ELPH-MCNC: 4 G/DL
ALP BLD-CCNC: 47 U/L
ALT SERPL-CCNC: 21 U/L
ANION GAP SERPL CALC-SCNC: 9 MMOL/L
AST SERPL-CCNC: 24 U/L
BASOPHILS # BLD AUTO: 0.01 K/UL
BASOPHILS NFR BLD AUTO: 0.2 %
BILIRUB SERPL-MCNC: 0.4 MG/DL
BUN SERPL-MCNC: 24 MG/DL
CALCIUM SERPL-MCNC: 8.8 MG/DL
CHLORIDE SERPL-SCNC: 105 MMOL/L
CO2 SERPL-SCNC: 26 MMOL/L
CREAT SERPL-MCNC: 1.38 MG/DL
EGFRCR SERPLBLD CKD-EPI 2021: 55 ML/MIN/1.73M2
EOSINOPHIL # BLD AUTO: 0.01 K/UL
EOSINOPHIL NFR BLD AUTO: 0.2 %
GLUCOSE SERPL-MCNC: 83 MG/DL
HCT VFR BLD CALC: 43.4 %
HGB BLD-MCNC: 13.1 G/DL
IMM GRANULOCYTES NFR BLD AUTO: 1.1 %
LYMPHOCYTES # BLD AUTO: 1.35 K/UL
LYMPHOCYTES NFR BLD AUTO: 30.8 %
MAN DIFF?: NORMAL
MCHC RBC-ENTMCNC: 29.5 PG
MCHC RBC-ENTMCNC: 30.2 G/DL
MCV RBC AUTO: 97.7 FL
MONOCYTES # BLD AUTO: 0.33 K/UL
MONOCYTES NFR BLD AUTO: 7.5 %
NEUTROPHILS # BLD AUTO: 2.64 K/UL
NEUTROPHILS NFR BLD AUTO: 60.2 %
PLATELET # BLD AUTO: 148 K/UL
POTASSIUM SERPL-SCNC: 5 MMOL/L
PROT SERPL-MCNC: 5.6 G/DL
RBC # BLD: 4.44 M/UL
RBC # FLD: 16.7 %
SODIUM SERPL-SCNC: 140 MMOL/L
WBC # FLD AUTO: 4.39 K/UL

## (undated) DEVICE — SUT SILK 5-0 18" P-3

## (undated) DEVICE — CANNULA IRR ALCON ANTERIOR CHAMBER 30G

## (undated) DEVICE — SUT SILK 2-0 30" PSL

## (undated) DEVICE — MIDAS REX LEGEND TAPERED SM BORE 1.1MM X 8CM

## (undated) DEVICE — CODMAN PERFORATOR 14MM (BLUE)

## (undated) DEVICE — MIDAS REX LEGEND TAPERED SM BORE 2.3MM X 8CM

## (undated) DEVICE — SUT VICRYL 3-0 18" SH (POP-OFF)

## (undated) DEVICE — VENODYNE/SCD SLEEVE CALF MEDIUM

## (undated) DEVICE — DRAPE SURGICAL #1010

## (undated) DEVICE — PACK CRANIOTOMY LNX SURGICOUNT

## (undated) DEVICE — WARMING BLANKET UPPER ADULT

## (undated) DEVICE — TRANSFORMER INTREPID I/A 0.3MM

## (undated) DEVICE — DRAPE MICROSCOPE KNOB COVER SMALL (2 PCS)

## (undated) DEVICE — PACK ANTERIOR SEGMENT

## (undated) DEVICE — MIDAS REX LEGEND BALL FLUTED ACORN LG BORE 7.5MM X 9CM

## (undated) DEVICE — DRAPE BACK TABLE COVER 65X90"

## (undated) DEVICE — GLV 7.5 PROTEXIS (WHITE)

## (undated) DEVICE — SUT VICRYL 2-0 27" SH

## (undated) DEVICE — KNIFE ALCON PARACENTESIS CLEARCUT SIDEPORT 1MM (YELLOW)

## (undated) DEVICE — SOL IRR BAG BSS 500ML

## (undated) DEVICE — TIP OZIL 12 DEGREE MINI FLARE

## (undated) DEVICE — NUCLEUS HYDRODISSECTOR PEARCE ANGLED 25G X 22MM

## (undated) DEVICE — KIT CENTURION ANTERIOR

## (undated) DEVICE — CARTRIDGE PLATINUM

## (undated) DEVICE — PACK CENTURION 2.4MM

## (undated) DEVICE — BUR GILLEN SURGICAL ACORN SHORT 7.5MM

## (undated) DEVICE — Device

## (undated) DEVICE — DRAPE TOWEL BLUE 17" X 24"

## (undated) DEVICE — GLV 7 PROTEXIS (WHITE)

## (undated) DEVICE — APPLICATOR COTTON TIP 3" STERILE

## (undated) DEVICE — CRANIAL MASK TRACKER

## (undated) DEVICE — POLISHER OR CAUTERY TIP

## (undated) DEVICE — TUBING SUCTION 20FT

## (undated) DEVICE — MIDAS REX LEGEND LUBRICANT DIFFUSER CARTRIDGE

## (undated) DEVICE — GOWN ROYAL SILK XL

## (undated) DEVICE — DRSG MASTISOL

## (undated) DEVICE — FOLEY TRAY 16FR 5CC LF UMETER CLOSED

## (undated) DEVICE — GONIO LENS IPRISM S 1.1X LEFT HAND

## (undated) DEVICE — NDL SPINAL 20G X 3.5" (YELLOW)